# Patient Record
Sex: MALE | Race: WHITE | NOT HISPANIC OR LATINO | Employment: OTHER | URBAN - METROPOLITAN AREA
[De-identification: names, ages, dates, MRNs, and addresses within clinical notes are randomized per-mention and may not be internally consistent; named-entity substitution may affect disease eponyms.]

---

## 2018-07-24 PROBLEM — I10 HTN, GOAL BELOW 140/90: Status: ACTIVE | Noted: 2018-07-24

## 2018-10-01 ENCOUNTER — HOSPITAL ENCOUNTER (OUTPATIENT)
Dept: RADIOLOGY | Facility: HOSPITAL | Age: 73
Discharge: HOME | End: 2018-10-01
Attending: INTERNAL MEDICINE
Payer: MEDICARE

## 2018-10-01 DIAGNOSIS — M25.552 LEFT HIP PAIN: ICD-10-CM

## 2018-10-01 PROBLEM — R52 ACUTE PAIN: Status: ACTIVE | Noted: 2018-10-01

## 2018-10-01 PROCEDURE — 73502 X-RAY EXAM HIP UNI 2-3 VIEWS: CPT | Mod: TC,FY,LT

## 2018-10-01 PROCEDURE — 73502 X-RAY EXAM HIP UNI 2-3 VIEWS: CPT | Mod: 26,LT,, | Performed by: RADIOLOGY

## 2018-12-27 PROBLEM — M10.9 ACUTE GOUT OF RIGHT ANKLE: Status: ACTIVE | Noted: 2018-12-27

## 2018-12-27 PROBLEM — M79.604 RIGHT LEG PAIN: Status: ACTIVE | Noted: 2018-12-27

## 2018-12-27 PROBLEM — M10.9 GOUTY ARTHRITIS OF RIGHT GREAT TOE: Status: ACTIVE | Noted: 2018-12-27

## 2019-01-28 ENCOUNTER — HOSPITAL ENCOUNTER (OUTPATIENT)
Dept: RADIOLOGY | Facility: HOSPITAL | Age: 74
Discharge: HOME | End: 2019-01-28
Attending: INTERNAL MEDICINE
Payer: MEDICARE

## 2019-01-28 DIAGNOSIS — M25.571 ACUTE RIGHT ANKLE PAIN: ICD-10-CM

## 2019-01-28 PROCEDURE — 73610 X-RAY EXAM OF ANKLE: CPT | Mod: 26,RT,, | Performed by: RADIOLOGY

## 2019-01-28 PROCEDURE — 73610 XR ANKLE COMPLETE 3 VIEW RIGHT: ICD-10-PCS | Mod: 26,RT,, | Performed by: RADIOLOGY

## 2019-01-28 PROCEDURE — 73610 X-RAY EXAM OF ANKLE: CPT | Mod: TC,FY,RT

## 2019-01-28 NOTE — PROGRESS NOTES
Per Dr. Bates- Pt has fracture. I spoke with Dr. Wharton and he recommended pt go straight to ER. We communicated this to the pt. -LGF

## 2019-01-29 ENCOUNTER — HOSPITAL ENCOUNTER (EMERGENCY)
Facility: HOSPITAL | Age: 74
Discharge: HOME | End: 2019-01-29
Attending: EMERGENCY MEDICINE
Payer: MEDICARE

## 2019-01-29 VITALS
OXYGEN SATURATION: 93 % | RESPIRATION RATE: 22 BRPM | HEIGHT: 69 IN | WEIGHT: 295 LBS | SYSTOLIC BLOOD PRESSURE: 188 MMHG | BODY MASS INDEX: 43.69 KG/M2 | DIASTOLIC BLOOD PRESSURE: 93 MMHG | HEART RATE: 92 BPM | TEMPERATURE: 99 F

## 2019-01-29 DIAGNOSIS — S82.891A CLOSED FRACTURE OF RIGHT ANKLE, INITIAL ENCOUNTER: Primary | ICD-10-CM

## 2019-01-29 PROCEDURE — 99283 EMERGENCY DEPT VISIT LOW MDM: CPT | Mod: 25

## 2019-01-29 PROCEDURE — 29515 APPLICATION SHORT LEG SPLINT: CPT | Mod: RT

## 2019-01-29 NOTE — DISCHARGE INSTRUCTIONS
No weight bearing on right leg. Your fracture should require surgery so you must follow up with the orthopedic.

## 2019-01-29 NOTE — ED PROVIDER NOTES
"Encounter Date: 1/29/2019    SCRIBE #1 NOTE: I, Dominic Urbina, am scribing for, and in the presence of,  Morgan Bahena MD. I have scribed the following portions of the note - Other sections scribed: HPI, ROS.       History     Chief Complaint   Patient presents with    Ankle Pain     several weeks ago was doing dishes when he felt "like a hammer to my ankle".   went to PCP yesterday, xray, + Fx.  sent here for further workup.  complaints of right ankle pain.     CC: Ankle Pain    HPI: This 73 y.o. Male with abnormal bilirubin test, arthritis, bilateral knee effusions, chronic bronchitis, chronic sinusitis, GERD, HTN, hyperlipidemia and NELLY presents to the ED for an evaluation of R ankle pain which began while standing unloading his  6 weeks ago. Pt reports his pain feels like "a hammer to my ankle." He recently visited his PCP in 12/2018 and was diagnosed with gout. Pt has been walking with a crutch and walker since leaving his PCP. He attempted tylenol for pain with slight relief and complies with cpap for NELLY. Pt denies fever, abdominal pain, diarrhea, nausea, weakness, fatigue, chest pain, SOB, rhinorrhea or cough.    PCP: Dr. Umang Wharton - most recently seen yesterday  Allergies: lidocaine, kenalog      The history is provided by the patient. No  was used.     Review of patient's allergies indicates:   Allergen Reactions    Bee sting [allergen ext-venom-honey bee]     Culver clement (solidago canadensis)     Kenalog [triamcinolone acetonide]     Lidocaine      Past Medical History:   Diagnosis Date    Abnormal bilirubin test     Arthritis     Atrial fibrillation     Bilateral knee effusions     Bilateral knee pain     Chronic bronchitis     Chronic sinusitis     GERD (gastroesophageal reflux disease)     HTN (hypertension)     HTN, goal below 130/80 6/1/2016    Hyperlipidemia LDL goal < 130     Maxillary sinus polyp     Morbid obesity with BMI of 40.0-44.9, " adult     NELLY (obstructive sleep apnea)      Past Surgical History:   Procedure Laterality Date    APPENDECTOMY      Age 55    COLON SURGERY      collectomy    RECONSTRUCTION-NASAL septum  10/4/2016    [facility]    SINUS SURGERY FUNCTIONAL ENDOSCOPIC WITH NAVIGATION N/A 10/4/2016    [facility]    SPHENOIDECTOMY- FRONTAL-  MAXILLARY- ETHMOID Bilateral 10/4/2016    [facility]formed by [facility] at [facility]     Family History   Problem Relation Age of Onset    Heart failure Mother     Stroke Father         Hemorrhagic stroke    Heart failure Brother         2 brothers    Rheum arthritis Brother     Hypertension Son      Social History     Tobacco Use    Smoking status: Former Smoker     Packs/day: 1.00     Years: 20.00     Pack years: 20.00     Types: Cigarettes     Last attempt to quit: 9/4/2003     Years since quitting: 15.4    Smokeless tobacco: Never Used   Substance Use Topics    Alcohol use: Yes     Comment: occ    Drug use: No     Review of Systems   Constitutional: Negative for chills and fever.   HENT: Negative for ear pain, rhinorrhea and sore throat.    Eyes: Negative for redness.   Respiratory: Negative for shortness of breath.    Cardiovascular: Negative for chest pain.   Gastrointestinal: Negative for abdominal pain, diarrhea, nausea and vomiting.   Genitourinary: Negative for dysuria and hematuria.   Musculoskeletal: Positive for arthralgias (R ankle). Negative for back pain and neck pain.   Skin: Negative for rash.   Neurological: Negative for weakness, numbness and headaches.   Hematological: Does not bruise/bleed easily.       Physical Exam     Initial Vitals   BP Pulse Resp Temp SpO2   01/29/19 0823 01/29/19 0823 01/29/19 0823 01/29/19 0823 01/29/19 0835   (!) 175/81 102 18 97.8 °F (36.6 °C) 95 %      MAP       --                Physical Exam    Nursing note and vitals reviewed.  Constitutional: He appears well-developed  and well-nourished.   HENT:   Head: Atraumatic.   Eyes: EOM are normal. Pupils are equal, round, and reactive to light.   Neck: Normal range of motion. Neck supple. No JVD present.   Cardiovascular: Normal rate, regular rhythm, normal heart sounds and intact distal pulses. Exam reveals no gallop and no friction rub.    No murmur heard.  Pulmonary/Chest: Breath sounds normal.   Abdominal: Soft. Bowel sounds are normal.   Musculoskeletal:   Swelling to the right ankle.  Tenderness to the medial malleolus.  Normal pulses. Normal cap refill.   Lymphadenopathy:     He has no cervical adenopathy.   Neurological: He is alert and oriented to person, place, and time. He has normal strength.   Skin: Skin is warm and dry.   Psychiatric: He has a normal mood and affect. Thought content normal.         ED Course   Orthopedic Injury  Date/Time: 1/29/2019 10:00 AM  Performed by: Morgan Bahena MD  Authorized by: Morgan Bahena MD     Injury:     Injury location:  Ankle    Location details:  Right ankle    Injury type:  Fracture    Fracture type: medial malleolus fracture        Pre-procedure assessment:     Neurovascular status: Neurovascularly intact      Distal perfusion: normal      Neurological function: normal        Selections made in this section will also lock the Injury type section above.:     Manipulation performed?: No      Immobilization:  Splint    Splint type:  Short leg    Supplies used:  Ortho-Glass    Complications: No      Specimens: No      Implants: No    Post-procedure assessment:     Neurovascular status: Neurovascularly intact      Distal perfusion: normal      Neurological function: normal      Range of motion: splinted      Patient tolerance:  Patient tolerated the procedure well with no immediate complications      Labs Reviewed - No data to display       Imaging Results    None       X-Rays:   Independently Interpreted Readings:   Other Readings:  Right ankle x-ray:  Minimally displaced  medial malleolar fracture with intra-articular extension      due to length of time since fracture I discussed the case with Orthopedics who states nonweightbearing, splint, clinic follow-up.          Scribe Attestation:   Scribe #1: I performed the above scribed service and the documentation accurately describes the services I performed. I attest to the accuracy of the note.    Attending Attestation:           Physician Attestation for Scribe:  Physician Attestation Statement for Scribe #1: I, Morgan Bahena MD, reviewed documentation, as scribed by Dominic Urbina in my presence, and it is both accurate and complete.                    Clinical Impression:   The encounter diagnosis was Closed fracture of right ankle, initial encounter.                             Morgan Bahena MD  02/25/19 [id number]

## 2019-01-29 NOTE — ED TRIAGE NOTES
Pt reports approx 6 weeks ago he was standing and he spontaneously had sharp pain to right ankle. Reports his PCP sent him 2 days ago to ER & he just had a ride today.

## 2019-03-03 ENCOUNTER — HOSPITAL ENCOUNTER (EMERGENCY)
Facility: HOSPITAL | Age: 74
Discharge: HOME | End: 2019-03-03
Attending: EMERGENCY MEDICINE
Payer: MEDICARE

## 2019-03-03 VITALS
HEART RATE: 70 BPM | WEIGHT: 300 LBS | SYSTOLIC BLOOD PRESSURE: 146 MMHG | OXYGEN SATURATION: 98 % | BODY MASS INDEX: 45.47 KG/M2 | RESPIRATION RATE: 16 BRPM | DIASTOLIC BLOOD PRESSURE: 87 MMHG | HEIGHT: 68 IN | TEMPERATURE: 98 F

## 2019-03-03 DIAGNOSIS — S60.811A ABRASION OF RIGHT WRIST, INITIAL ENCOUNTER: ICD-10-CM

## 2019-03-03 DIAGNOSIS — S06.0X1A CONCUSSION WITH LOSS OF CONSCIOUSNESS OF 30 MINUTES OR LESS, INITIAL ENCOUNTER: ICD-10-CM

## 2019-03-03 DIAGNOSIS — S80.211A ABRASION, RIGHT KNEE, INITIAL ENCOUNTER: ICD-10-CM

## 2019-03-03 DIAGNOSIS — S00.81XA ABRASION OF FACE, INITIAL ENCOUNTER: ICD-10-CM

## 2019-03-03 DIAGNOSIS — S00.83XA CONTUSION OF FACE, INITIAL ENCOUNTER: Primary | ICD-10-CM

## 2019-03-03 DIAGNOSIS — F10.920 ALCOHOLIC INTOXICATION WITHOUT COMPLICATION: ICD-10-CM

## 2019-03-03 PROCEDURE — 25000003 PHARM REV CODE 250: Performed by: EMERGENCY MEDICINE

## 2019-03-03 PROCEDURE — 99284 EMERGENCY DEPT VISIT MOD MDM: CPT

## 2019-03-03 RX ORDER — TRAMADOL HYDROCHLORIDE 50 MG/1
50 TABLET ORAL EVERY 6 HOURS PRN
Qty: 20 TABLET | Refills: 0 | Status: SHIPPED | OUTPATIENT
Start: 2019-03-03 | End: 2019-03-13

## 2019-03-03 RX ORDER — MELOXICAM 7.5 MG/1
7.5 TABLET ORAL DAILY
Qty: 20 TABLET | Refills: 0 | Status: SHIPPED | OUTPATIENT
Start: 2019-03-03 | End: 2019-04-26

## 2019-03-03 RX ORDER — TIZANIDINE 2 MG/1
4 TABLET ORAL EVERY 6 HOURS PRN
Qty: 20 TABLET | Refills: 0 | Status: SHIPPED | OUTPATIENT
Start: 2019-03-03 | End: 2019-03-13

## 2019-03-03 RX ADMIN — BACITRACIN, NEOMYCIN, POLYMYXIN B 1 EACH: 400; 3.5; 5 OINTMENT TOPICAL at 09:03

## 2019-03-04 NOTE — ED TRIAGE NOTES
"Reports drinking 5 beers and 5 16oz rum and coke this afternoon, pt was sitting in a rocking chair on the front porch and fell forward hitting his head on the sidewalk. Pt has swelling and dark purple bruising noted to the left eye and abrasions to the left eyebrow area, rt knee and rt forearm, pt states, "I was drinking my rum and coke, you know, and I fell now I am here and am like what the hell!" Pt also has on a LLE immobilizer rocker boot.  Pt denies any other complaints. Supposed to be on blood thinners refusing to take them currently.  "

## 2019-03-04 NOTE — ED PROVIDER NOTES
"Encounter Date: 3/3/2019       History     Chief Complaint   Patient presents with    Alcohol Intoxication     reports drinking 5 beers and 5 16oz rum and coke this afternoon, pt was sitting in a rocking chair on the front porch and fell forward hitting his head on the sidewalk. Pt has swelling and bruising noted to the left eye, pt states, "I was drinking my rum and coke, you know, and I fell now I am here and am like what the hell!" Pt denies any other complaints. Supposed to be on blood thinners refusing to take them currently.      Patient presents after ground level fall via EMS.  He is heavily intoxicated on alcohol.  He fell from a chair face 1st onto the concrete.  No loss of consciousness.  Patient is amnestic events.  He has no complaints. Bystanders noted that he had a injury to the left periorbital area.  He also has abrasions to the lower right arm and right knee.  EMS notes severe bruising and swelling to the left periorbital area with abrasions to the forehead and face.  Patient denies neck pain or back pain he denies chest pain or shortness of breath. No abdominal pain. No focal numbness or weakness. No pain in the extremities. Patient has atrial fibrillation but is no longer taking anticoagulants because he is not compliant.  States he has not taken it for 3 years.          Review of patient's allergies indicates:   Allergen Reactions    Bee sting [allergen ext-venom-honey bee]     Culver clement (solidago canadensis)     Kenalog [triamcinolone acetonide]     Lidocaine      Past Medical History:   Diagnosis Date    Abnormal bilirubin test     Arthritis     Atrial fibrillation     Bilateral knee effusions     Bilateral knee pain     Chronic bronchitis     Chronic sinusitis     GERD (gastroesophageal reflux disease)     HTN (hypertension)     HTN, goal below 130/80 6/1/2016    Hyperlipidemia LDL goal < 130     Maxillary sinus polyp     Morbid obesity with BMI of 40.0-44.9, adult     NELLY " (obstructive sleep apnea)      Past Surgical History:   Procedure Laterality Date    APPENDECTOMY      Age 55    COLON SURGERY      collectomy    RECONSTRUCTION-NASAL septum  10/4/2016    [facility]    SINUS SURGERY FUNCTIONAL ENDOSCOPIC WITH NAVIGATION N/A 10/4/2016    [facility]    SPHENOIDECTOMY- FRONTAL-  MAXILLARY- ETHMOID Bilateral 10/4/2016    [facility] at [facility]     Family History   Problem Relation Age of Onset    Heart failure Mother     Stroke Father         Hemorrhagic stroke    Heart failure Brother         2 brothers    Rheum arthritis Brother     Hypertension Son      Social History     Tobacco Use    Smoking status: Former Smoker     Packs/day: 1.00     Years: 20.00     Pack years: 20.00     Types: Cigarettes     Last attempt to quit: 9/4/2003     Years since quitting: 15.5    Smokeless tobacco: Never Used   Substance Use Topics    Alcohol use: Yes     Comment: occ    Drug use: No     Review of Systems   Constitutional: Negative for diaphoresis.   HENT: Positive for facial swelling. Negative for ear pain and nosebleeds.    Eyes: Negative for pain and visual disturbance.   Respiratory: Negative for chest tightness and shortness of breath.    Cardiovascular: Negative for chest pain.   Gastrointestinal: Negative for abdominal pain, diarrhea, nausea and vomiting.   Genitourinary: Negative for flank pain.   Musculoskeletal: Negative for arthralgias, back pain, joint swelling, myalgias and neck pain.   Skin: Positive for wound.   Neurological: Positive for syncope. Negative for dizziness, tremors, seizures, facial asymmetry, speech difficulty, weakness, light-headedness, numbness and headaches.   Psychiatric/Behavioral: Positive for confusion. Negative for agitation and hallucinations.       Physical Exam     Initial Vitals [03/03/19 2058]   BP Pulse Resp Temp SpO2   (!) 148/87 71 18 97.8 °F (36.6 °C) (!) 94 %       MAP       --         Physical Exam    Nursing note and vitals reviewed.  Constitutional: He appears well-developed and well-nourished. He is not diaphoretic. No distress.   HENT:   Head: Normocephalic.   Right Ear: External ear normal.   Left Ear: External ear normal.   Nose: Nose normal.   Mouth/Throat: Oropharynx is clear and moist.   Deep abrasion to left temporal area.  Superficial abrasion left forehead.  Superficial abrasion left maxillary area.  No bony tenderness to the face.  No periorbital tenderness. Large contusion ecchymosis to the left lower lid in periorbital area.  No nasal bone tenderness. No dental injury. No mandibular tenderness.   Eyes: Conjunctivae and EOM are normal. Pupils are equal, round, and reactive to light. Right eye exhibits no discharge. Left eye exhibits no discharge.   Neck: Neck supple. No tracheal deviation present.   No cervical spine tenderness.   Cardiovascular: Normal rate, regular rhythm and normal heart sounds.   No murmur heard.  Pulmonary/Chest: Breath sounds normal. No stridor. No respiratory distress. He has no wheezes. He has no rhonchi. He has no rales. He exhibits no tenderness.   Abdominal: Soft. He exhibits no distension. There is no tenderness. There is no rebound and no guarding.   Musculoskeletal: Normal range of motion. He exhibits no edema or tenderness.   Superficial abrasion to right anterior knee.  Superficial abrasion to dorsal right wrist and elbow.  No bony tenderness to bilateral upper lower extremities. No pain with range of motion bilateral upper lower extremities. No deformity or joint effusions of bilateral upper lower extremities. No tenderness over thoracic or lumbar spine.   Neurological: He is alert and oriented to person, place, and time. He has normal strength. No cranial nerve deficit. GCS score is 15. GCS eye subscore is 4. GCS verbal subscore is 5. GCS motor subscore is 6.   Skin: Skin is warm and dry. No rash noted.   Psychiatric: His  behavior is normal. Thought content normal.   Intoxicated.  Slurred speech.  Patient smells like alcohol.  Patient euphoric.         ED Course   Procedures  Labs Reviewed - No data to display       Imaging Results    None          Medical Decision Making:   Differential Diagnosis:   Contusion, fracture, traumatic brain injury, concussion, alcohol intoxication,  Clinical Tests:   Radiological Study: Reviewed  ED Management:  No fractures or traumatic brain injury on CT.  Patient's mental status remains normal. GCS 15.  Patient is no longer on anticoagulants.  Patient is stable for discharge. Patient given instructions on local wound care.  Patient also given instructions on head injuries.                      Clinical Impression:       ICD-10-CM ICD-9-CM   1. Contusion of face, initial encounter S00.83XA 920   2. Concussion with loss of consciousness of 30 minutes or less, initial encounter S06.0X1A 850.11   3. Alcoholic intoxication without complication F10.920 305.00   4. Abrasion of face, initial encounter S00.81XA 910.0   5. Abrasion, right knee, initial encounter S80.211A 916.0   6. Abrasion of right wrist, initial encounter S60.811A 913.0         Disposition:   Disposition: Discharged  Condition: Stable                        Glen Fernando MD  03/03/19 0360

## 2019-03-21 ENCOUNTER — HOSPITAL ENCOUNTER (EMERGENCY)
Facility: HOSPITAL | Age: 74
Discharge: HOME | End: 2019-03-21
Attending: EMERGENCY MEDICINE
Payer: MEDICARE

## 2019-03-21 VITALS
BODY MASS INDEX: 44.43 KG/M2 | HEART RATE: 73 BPM | OXYGEN SATURATION: 97 % | HEIGHT: 69 IN | TEMPERATURE: 98 F | WEIGHT: 300 LBS | SYSTOLIC BLOOD PRESSURE: 136 MMHG | DIASTOLIC BLOOD PRESSURE: 82 MMHG | RESPIRATION RATE: 20 BRPM

## 2019-03-21 DIAGNOSIS — R33.9 URINARY RETENTION: Primary | ICD-10-CM

## 2019-03-21 LAB
BACTERIA #/AREA URNS HPF: ABNORMAL /HPF
BILIRUB UR QL STRIP: NEGATIVE
CLARITY UR: CLEAR
COLOR UR: YELLOW
GLUCOSE UR QL STRIP: NEGATIVE
HGB UR QL STRIP: ABNORMAL
HYALINE CASTS #/AREA URNS LPF: 0 /LPF
KETONES UR QL STRIP: NEGATIVE
LEUKOCYTE ESTERASE UR QL STRIP: NEGATIVE
MICROSCOPIC COMMENT: ABNORMAL
NITRITE UR QL STRIP: NEGATIVE
PH UR STRIP: 5 [PH] (ref 5–8)
PROT UR QL STRIP: ABNORMAL
RBC #/AREA URNS HPF: 10 /HPF (ref 0–4)
SP GR UR STRIP: 1.01 (ref 1–1.03)
URN SPEC COLLECT METH UR: ABNORMAL
UROBILINOGEN UR STRIP-ACNC: NEGATIVE EU/DL
WBC #/AREA URNS HPF: 1 /HPF (ref 0–5)

## 2019-03-21 PROCEDURE — 81000 URINALYSIS NONAUTO W/SCOPE: CPT

## 2019-03-21 PROCEDURE — 51702 INSERT TEMP BLADDER CATH: CPT

## 2019-03-21 PROCEDURE — 51798 US URINE CAPACITY MEASURE: CPT

## 2019-03-21 PROCEDURE — 99283 EMERGENCY DEPT VISIT LOW MDM: CPT | Mod: 25

## 2019-03-21 RX ORDER — TAMSULOSIN HYDROCHLORIDE 0.4 MG/1
0.4 CAPSULE ORAL DAILY
Qty: 10 CAPSULE | Refills: 0 | Status: SHIPPED | OUTPATIENT
Start: 2019-03-21 | End: 2019-03-29 | Stop reason: SDUPTHER

## 2019-03-21 NOTE — ED PROVIDER NOTES
Encounter Date: 3/21/2019    SCRIBE #1 NOTE: I, Jones Ledezma, am scribing for, and in the presence of,  Per Schulz MD. I have scribed the following portions of the note - Other sections scribed: HPI and ROS.       History     Chief Complaint   Patient presents with    Urinary Retention     last urinated 2 days ago; lower abdominal pain radiates to left side     CC: Urinary Retention    HPI: This 73 y.0 M with a hx of Abnormal bilirubin test, Arthritis, A-fib, GERD, HTN HLD, Maxillary sinus polyp, Morbid obesity with BMI of 40.0-44.9, adult, and NELLY presents to the ED c/o urinary retention with associated suprapubic abdominal pain x2 days. He also reports intermittent RUQ. The pt last urinated at 2300h 3/19/19. He notes that he drank 5 beers 3/19/19 PM. The pt states that he has not eaten in over 20 hours. Additionally, he reports an intermittent rash to the bilateral upper extremities. He denies fever, chills, diaphoresis, nausea, emesis, diaphoresis, headache, otalgia, sore throat, congestion and SOB. No prior tx. He does not smoke cigarettes.       The history is provided by the patient. No  was used.     Review of patient's allergies indicates:   Allergen Reactions    Bee sting [allergen ext-venom-honey bee]     Culver clement (solidago canadensis)     Kenalog [triamcinolone acetonide]     Lidocaine      Past Medical History:   Diagnosis Date    Abnormal bilirubin test     Arthritis     Atrial fibrillation     Bilateral knee effusions     Bilateral knee pain     Chronic bronchitis     Chronic sinusitis     GERD (gastroesophageal reflux disease)     HTN (hypertension)     HTN, goal below 130/80 6/1/2016    Hyperlipidemia LDL goal < 130     Maxillary sinus polyp     Morbid obesity with BMI of 40.0-44.9, adult     NELLY (obstructive sleep apnea)      Past Surgical History:   Procedure Laterality Date    APPENDECTOMY      Age 55    COLON SURGERY      collectomy    NOSE SURGERY       RECONSTRUCTION-NASAL septum  10/4/2016    [facility]    SINUS SURGERY FUNCTIONAL ENDOSCOPIC WITH NAVIGATION N/A 10/4/2016    [facility]    SPHENOIDECTOMY- FRONTAL-  MAXILLARY- ETHMOID Bilateral 10/4/2016    [facility]     Family History   Problem Relation Age of Onset    Heart failure Mother     Stroke Father         Hemorrhagic stroke    Heart failure Brother         2 brothers    Rheum arthritis Brother     Hypertension Son      Social History     Tobacco Use    Smoking status: Former Smoker     Packs/day: 1.00     Years: 20.00     Pack years: 20.00     Types: Cigarettes     Last attempt to quit: 9/4/2003     Years since quitting: 15.5    Smokeless tobacco: Never Used   Substance Use Topics    Alcohol use: Yes     Comment: Occasionally    Drug use: No     Review of Systems   Constitutional: Negative for chills and fever.   HENT: Negative for congestion, ear pain, rhinorrhea and sore throat.    Eyes: Negative for pain and visual disturbance.   Respiratory: Negative for cough and shortness of breath.    Cardiovascular: Negative for chest pain.   Gastrointestinal: Positive for abdominal pain. Negative for diarrhea, nausea and vomiting.   Genitourinary: Negative for dysuria.        (+) urinary retention   Musculoskeletal: Negative for back pain and neck pain.   Skin: Positive for rash (intermittent).   Neurological: Negative for headaches.       Physical Exam     Initial Vitals   BP Pulse Resp Temp SpO2   03/21/19 0838 03/21/19 0838 03/21/19 0838 03/21/19 0838 03/21/19 0900   (!) 202/98 89 20 97.5 °F (36.4 °C) 96 %      MAP       --                Physical Exam  The patient was examined specifically for the following:   General:No significant distress, Good color, Warm and dry. Head and neck:Scalp atraumatic, Neck supple. Neurological:Appropriate conversation, Gross motor deficits. Eyes:Conjugate gaze, Clear  corneas. ENT: No epistaxis. Cardiac: Regular rate and rhythm, Grossly normal heart tones. Pulmonary: Wheezing, Rales. Gastrointestinal: Abdominal tenderness, Abdominal distention. Musculoskeletal: Extremity deformity, Apparent pain with range of motion of the joints. Skin: Rash.   The findings on examination were normal except for the following:  The patient has a contusion of on the left face.  The lungs are clear.  The heart tones are normal.  The abdomen is remarkable for bilateral pelvic tenderness and fullness.   ED Course   Procedures  Labs Reviewed   URINALYSIS - Abnormal; Notable for the following components:       Result Value    Protein, UA 1+ (*)     Occult Blood UA 2+ (*)     All other components within normal limits   URINALYSIS MICROSCOPIC - Abnormal; Notable for the following components:    RBC, UA 10 (*)     All other components within normal limits          Imaging Results    None       Medical decision making:  This 73-year-old male presents to the emergency room unable to urinate for 2 days.  A Fatima catheter was placed.  The patient had 2000 mL in the bladder.  He experience relief of his symptoms.  The urine was not infected.  I will discharge this patient to follow up with Urology.  I will start Flomax.  I will discharge him with his catheter in place.  I will have him empty his leg bag as needed.                Scribe Attestation:   Scribe #1: I performed the above scribed service and the documentation accurately describes the services I performed. I attest to the accuracy of the note.    Attending Attestation:           Physician Attestation for Scribe:  Physician Attestation Statement for Scribe #1: I, Per Schulz MD, reviewed documentation, as scribed by Jones Ledezma in my presence, and it is both accurate and complete.                    Clinical Impression:       ICD-10-CM ICD-9-CM   1. Urinary retention R33.9 788.20                                Per Schulz MD  03/27/19  [id number]

## 2019-03-21 NOTE — DISCHARGE INSTRUCTIONS
Please return immediately ifYou get worse or if new problems develop.  Please follow-up with  The urologist above, this week.  Rest.  Please empty your leg bag as needed.  Flomax as directed.

## 2019-03-21 NOTE — ED TRIAGE NOTES
Pt states he has not been urinating. States the last time he urinated was 2 days ago. Denies n/v/d. Also c/o of lower abdominal pain.

## 2019-03-22 ENCOUNTER — HOSPITAL ENCOUNTER (EMERGENCY)
Facility: HOSPITAL | Age: 74
Discharge: HOME | End: 2019-03-22
Attending: EMERGENCY MEDICINE
Payer: MEDICARE

## 2019-03-22 VITALS
HEART RATE: 90 BPM | SYSTOLIC BLOOD PRESSURE: 161 MMHG | BODY MASS INDEX: 45.47 KG/M2 | HEIGHT: 68 IN | OXYGEN SATURATION: 94 % | RESPIRATION RATE: 18 BRPM | TEMPERATURE: 99 F | WEIGHT: 300 LBS | DIASTOLIC BLOOD PRESSURE: 72 MMHG

## 2019-03-22 DIAGNOSIS — R33.9 URINARY RETENTION: ICD-10-CM

## 2019-03-22 DIAGNOSIS — R31.9 HEMATURIA, UNSPECIFIED TYPE: Primary | ICD-10-CM

## 2019-03-22 LAB
ALBUMIN SERPL BCP-MCNC: 3.6 G/DL
ALP SERPL-CCNC: 81 U/L
ALT SERPL W/O P-5'-P-CCNC: 22 U/L
ANION GAP SERPL CALC-SCNC: 6 MMOL/L
APTT BLDCRRT: <21 SEC
AST SERPL-CCNC: 25 U/L
BASOPHILS # BLD AUTO: 0.04 K/UL
BASOPHILS NFR BLD: 0.4 %
BILIRUB SERPL-MCNC: 1 MG/DL
BUN SERPL-MCNC: 20 MG/DL
CALCIUM SERPL-MCNC: 10.2 MG/DL
CHLORIDE SERPL-SCNC: 103 MMOL/L
CO2 SERPL-SCNC: 27 MMOL/L
CREAT SERPL-MCNC: 1.3 MG/DL
DIFFERENTIAL METHOD: ABNORMAL
EOSINOPHIL # BLD AUTO: 0.3 K/UL
EOSINOPHIL NFR BLD: 2.7 %
ERYTHROCYTE [DISTWIDTH] IN BLOOD BY AUTOMATED COUNT: 14.4 %
EST. GFR  (AFRICAN AMERICAN): >60 ML/MIN/1.73 M^2
EST. GFR  (NON AFRICAN AMERICAN): 54 ML/MIN/1.73 M^2
GLUCOSE SERPL-MCNC: 106 MG/DL
HCT VFR BLD AUTO: 41.6 %
HGB BLD-MCNC: 13.5 G/DL
INR PPP: 1
LYMPHOCYTES # BLD AUTO: 1.1 K/UL
LYMPHOCYTES NFR BLD: 11.5 %
MCH RBC QN AUTO: 28.1 PG
MCHC RBC AUTO-ENTMCNC: 32.5 G/DL
MCV RBC AUTO: 87 FL
MONOCYTES # BLD AUTO: 1 K/UL
MONOCYTES NFR BLD: 9.9 %
NEUTROPHILS # BLD AUTO: 7.3 K/UL
NEUTROPHILS NFR BLD: 75.5 %
PLATELET # BLD AUTO: 146 K/UL
PMV BLD AUTO: 13.1 FL
POTASSIUM SERPL-SCNC: 3.9 MMOL/L
PROT SERPL-MCNC: 6.9 G/DL
PROTHROMBIN TIME: 10.6 SEC
RBC # BLD AUTO: 4.8 M/UL
SODIUM SERPL-SCNC: 136 MMOL/L
WBC # BLD AUTO: 9.6 K/UL

## 2019-03-22 PROCEDURE — 99282 EMERGENCY DEPT VISIT SF MDM: CPT | Mod: 25

## 2019-03-22 PROCEDURE — 85730 THROMBOPLASTIN TIME PARTIAL: CPT

## 2019-03-22 PROCEDURE — 80053 COMPREHEN METABOLIC PANEL: CPT

## 2019-03-22 PROCEDURE — 85025 COMPLETE CBC W/AUTO DIFF WBC: CPT

## 2019-03-22 PROCEDURE — 51700 IRRIGATION OF BLADDER: CPT

## 2019-03-22 PROCEDURE — 85610 PROTHROMBIN TIME: CPT

## 2019-03-22 NOTE — ED PROVIDER NOTES
Encounter Date: 3/22/2019    SCRIBE #1 NOTE: I, Lacy Jones, am scribing for, and in the presence of,  Per Schulz MD. I have scribed the following portions of the note - Other sections scribed: HPI and ROS.       History     Chief Complaint   Patient presents with    Hematuria     Seen in the ED yesterday due to urinary retention, sent home with jaimes cath. Clear urine was ntoed yesterday, today only blood in cath bag. Reports bladder pressure.      CC: Hematuria    HPI: This 73 y.o male who has HTN, Arthritis, Atrial fibrillation, NELLY, GERD, HLD presents to the ED for an evaluation for hematuria that began earlier today.  Patient also reports of lower abdominal pain and has the sensation that he is not completely voiding his bladder.  Patient was seen in this ED on yesterday for urinary retention and was discharged home with a jaimes catheter.  Patient was prescribed Flomax and was advised to follow up with urology.  Patient also reports having diarrhea.  Patient denies fever, chills, nausea, emesis, cough, rhinorrhea, rash, back pain, or any other associated symptoms.  No alleviating factors.      The history is provided by the patient. No  was used.     Review of patient's allergies indicates:   Allergen Reactions    Bee sting [allergen ext-venom-honey bee]     Culver clement (solidago canadensis)     Kenalog [triamcinolone acetonide]     Lidocaine      Past Medical History:   Diagnosis Date    Abnormal bilirubin test     Arthritis     Atrial fibrillation     Bilateral knee effusions     Bilateral knee pain     Chronic bronchitis     Chronic sinusitis     GERD (gastroesophageal reflux disease)     HTN (hypertension)     HTN, goal below 130/80 6/1/2016    Hyperlipidemia LDL goal < 130     Maxillary sinus polyp     Morbid obesity with BMI of 40.0-44.9, adult     NELLY (obstructive sleep apnea)      Past Surgical History:   Procedure Laterality Date    APPENDECTOMY      Age 55     COLON SURGERY      collectomy    NOSE SURGERY      RECONSTRUCTION-NASAL septum  10/4/2016    [facility]    SINUS SURGERY FUNCTIONAL ENDOSCOPIC WITH NAVIGATION N/A 10/4/2016    [facility]    SPHENOIDECTOMY- FRONTAL-  MAXILLARY- ETHMOID Bilateral 10/4/2016    Per[facility] at [facility] OR     Family History   Problem Relation Age of Onset    Heart failure Mother     Stroke Father         Hemorrhagic stroke    Heart failure Brother         2 brothers    Rheum arthritis Brother     Hypertension Son      Social History     Tobacco Use    Smoking status: Former Smoker     Packs/day: 1.00     Years: 20.00     Pack years: 20.00     Types: Cigarettes     Last attempt to quit: 9/4/2003     Years since quitting: 15.5    Smokeless tobacco: Never Used   Substance Use Topics    Alcohol use: Yes     Comment: Occasionally    Drug use: No     Review of Systems   Constitutional: Negative for chills and fever.   HENT: Negative for congestion, ear pain, rhinorrhea and sore throat.    Eyes: Negative for pain and visual disturbance.   Respiratory: Negative for cough and shortness of breath.    Cardiovascular: Negative for chest pain.   Gastrointestinal: Positive for abdominal pain and diarrhea. Negative for nausea and vomiting.   Genitourinary: Positive for hematuria. Negative for dysuria.   Musculoskeletal: Negative for back pain and neck pain.   Skin: Negative for rash.   Neurological: Negative for headaches.       Physical Exam     Initial Vitals [03/22/19 1713]   BP Pulse Resp Temp SpO2   (!) 145/70 89 (!) 22 97.9 °F (36.6 °C) 95 %      MAP       --         Physical Exam  The patient was examined specifically for the following:   General:No significant distress, Good color, Warm and dry. Head and neck:Scalp atraumatic, Neck supple. Neurological:Appropriate conversation, Gross motor deficits. Eyes:Conjugate gaze, Clear corneas. ENT: No  epistaxis. Cardiac: Regular rate and rhythm, Grossly normal heart tones. Pulmonary: Wheezing, Rales. Gastrointestinal: Abdominal tenderness, Abdominal distention. Musculoskeletal: Extremity deformity, Apparent pain with range of motion of the joints. Skin: Rash.   The findings on examination were normal except for the following:  Patient is morbidly obese.  He has a Fatima in place with a bag full of bloody urine.   ED Course   Urinary Catheter  Date/Time: 3/22/2019 7:51 PM  Performed by: Per Schulz MD  Authorized by: Pre Schulz MD   Indications: urinary retention  Local anesthesia used: no    Anesthesia:  Local anesthesia used: no  Patient sedated: no  Catheter insertion: indwelling  Catheter type: coude  Catheter size: 22 Fr  Complicated insertion: no  Altered anatomy: no  Bladder irrigation: yes  Number of attempts: 1  Urine characteristics: bloody  Complications: No  Specimens: No  Implants: No        Labs Reviewed   CBC W/ AUTO DIFFERENTIAL - Abnormal; Notable for the following components:       Result Value    Hemoglobin 13.5 (*)     Platelets 146 (*)     MPV 13.1 (*)     Gran% 75.5 (*)     Lymph% 11.5 (*)     All other components within normal limits   APTT   PROTIME-INR   COMPREHENSIVE METABOLIC PANEL   URINALYSIS, REFLEX TO URINE CULTURE          Imaging Results    None       Medical decision making:  Given the above, this patient presents to the emergency room with hematuria.  He had a Fatima placed for retention.  The Fatima is now obstructed with blood clots.  I replaced Fatima with a 22 Irish coude catheter.  Consultation was obtained with Dr. Terry, this was his suggestion.  We will irrigate the bladder and discharge the patient if we feel that we can relieve the obstruction.  The patient drained 500 mL after the Fatima was placed.  Laboratory work is pending.  This will be signed out to the next emergency physician                Scribe Attestation:   Scribe #1: I performed the above scribed  service and the documentation accurately describes the services I performed. I attest to the accuracy of the note.    Attending Attestation:           Physician Attestation for Scribe:  Physician Attestation Statement for Scribe #1: I, Per Schulz MD, reviewed documentation, as scribed by Lacy Jones in my presence, and it is both accurate and complete.                    Clinical Impression:       ICD-10-CM ICD-9-CM   1. Hematuria, unspecified type R31.9 599.70   2. Urinary retention R33.9 788.20                                Per Schulz MD  03/22/19 1951

## 2019-03-23 NOTE — DISCHARGE INSTRUCTIONS
Please empty your leg bag as needed.  Return immediately if you get worse or if new problems develop.  Please follow-up with Urology on Monday or Tuesday of next week.  Call Monday morning for an appointment.  Lots of liquids.  Rest.

## 2019-03-23 NOTE — ED NOTES
Bladder irrigation completed, bladder draining light pink urine. MD made aware. Pt informed to follow-up with urology Monday morning. Leg bag applied, urine draining appropriately. In NAD, will monitor.

## 2019-03-23 NOTE — ED TRIAGE NOTES
Pt arrived via personal transport. He was seen yesterday for urinary retention and jaimes catheter was placed. Pt reports today that urine collection bag was filled only with blood and he is feeling bladder fullness.

## 2019-03-26 ENCOUNTER — TELEPHONE (OUTPATIENT)
Dept: UROLOGY | Facility: CLINIC | Age: 74
End: 2019-03-26

## 2019-03-26 NOTE — TELEPHONE ENCOUNTER
Patient notified that if the leg anchor is causing him discomfort- he can remove it- make sure leg bag is placed above the knee to prevent pulling and tugging of catheter. Place triple abt cream around the head of the penis to prevent irritation and infx. Apt made for Friday for a VOT with NP- location and time confirmed with patient.

## 2019-03-29 ENCOUNTER — OFFICE VISIT (OUTPATIENT)
Dept: UROLOGY | Facility: CLINIC | Age: 74
End: 2019-03-29
Payer: MEDICARE

## 2019-03-29 VITALS
BODY MASS INDEX: 45.47 KG/M2 | SYSTOLIC BLOOD PRESSURE: 118 MMHG | DIASTOLIC BLOOD PRESSURE: 60 MMHG | WEIGHT: 300 LBS | HEIGHT: 68 IN

## 2019-03-29 DIAGNOSIS — N40.1 BPH WITH OBSTRUCTION/LOWER URINARY TRACT SYMPTOMS: ICD-10-CM

## 2019-03-29 DIAGNOSIS — N13.8 BPH WITH OBSTRUCTION/LOWER URINARY TRACT SYMPTOMS: ICD-10-CM

## 2019-03-29 DIAGNOSIS — R33.9 URINARY RETENTION: ICD-10-CM

## 2019-03-29 DIAGNOSIS — R31.0 GROSS HEMATURIA: ICD-10-CM

## 2019-03-29 PROCEDURE — 51700 IRRIGATION OF BLADDER: CPT | Mod: S$PBB,,, | Performed by: NURSE PRACTITIONER

## 2019-03-29 PROCEDURE — 99204 PR OFFICE/OUTPT VISIT, NEW, LEVL IV, 45-59 MIN: ICD-10-PCS | Mod: S$PBB,25,, | Performed by: NURSE PRACTITIONER

## 2019-03-29 PROCEDURE — 51700 IRRIGATION OF BLADDER: CPT | Mod: PBBFAC | Performed by: NURSE PRACTITIONER

## 2019-03-29 PROCEDURE — 99204 OFFICE O/P NEW MOD 45 MIN: CPT | Mod: S$PBB,25,, | Performed by: NURSE PRACTITIONER

## 2019-03-29 PROCEDURE — 51700 PR IRRIGATION, BLADDER: ICD-10-PCS | Mod: S$PBB,,, | Performed by: NURSE PRACTITIONER

## 2019-03-29 PROCEDURE — 99999 PR PBB SHADOW E&M-EST. PATIENT-LVL IV: CPT | Mod: PBBFAC,,, | Performed by: NURSE PRACTITIONER

## 2019-03-29 PROCEDURE — 99214 OFFICE O/P EST MOD 30 MIN: CPT | Mod: PBBFAC,25 | Performed by: NURSE PRACTITIONER

## 2019-03-29 PROCEDURE — 51702 INSERT TEMP BLADDER CATH: CPT | Mod: PBBFAC | Performed by: NURSE PRACTITIONER

## 2019-03-29 PROCEDURE — 99999 PR PBB SHADOW E&M-EST. PATIENT-LVL IV: ICD-10-PCS | Mod: PBBFAC,,, | Performed by: NURSE PRACTITIONER

## 2019-03-29 RX ORDER — TAMSULOSIN HYDROCHLORIDE 0.4 MG/1
0.4 CAPSULE ORAL DAILY
Qty: 30 CAPSULE | Refills: 11 | Status: ON HOLD | OUTPATIENT
Start: 2019-03-29 | End: 2019-04-11 | Stop reason: SDUPTHER

## 2019-03-29 NOTE — PROGRESS NOTES
Subjective:       Patient ID: John E Sandifer is a 73 y.o. male who is a new patient was referred by Per Schulz MD for evaluation of urinary retention and gross hematuria    Chief Complaint:   Chief Complaint   Patient presents with    Other     VOT to be done today.. catheter was placed in Thursday last week.. could not use the bathrrom and was drained Friday because he was passing nothing but blood .. Saturday morning he noticed blood clots and now its clear       Urinary Retention  Patient complains of urinary retention. Onset of retention was several days ago and was sudden in onset. Patient currently does have a urinary catheter in place.  2L of urine were drained when catheter was placed. Prior to this event voiding symptoms consisted of slow stream, intermittency, nocturia x 2. Prior treatments include none. Recent medications that may have affected his voiding include none.  Denies prior occurrences of urinary retention.    He presented to the ER on 3/21/19 with a 2 day history of urinary retention. Jaimes placed in the ER and patient started on Flomax x 10 days. He is tolerating is jaimes fairly well. Denies flank pain or fever.    Hematuria  Patient complains of gross hematuria. Onset of hematuria was several days ago and was sudden in onset. There is not a history of nephrolithiasis. There is not a history of urologic trauma. Other urologic symptoms include slow stream, intermittency, nocturia. Patient admits to history of tobacco use. Patient denies history of Agent Orange exposure, chronic Jaimes catheter,  surgeries, sexually transmitted diseases, trauma and urolithiasis. Prior workup has been none.    Patient presented back to the ER the following day with c/o gross hematuria after having jaimes placed. He denies any difficulty or trauma with jaimes placement.  Jaimes catheter noted to be obstructed with blood clots. Catheter was irrigated and replaced in the ER.  He has not had any further  occurrences of gross hematuria since this time    ACTIVE MEDICAL ISSUES:  Patient Active Problem List   Diagnosis    A-fib    HTN (hypertension)    HLD (hyperlipidemia)    NELLY (obstructive sleep apnea)    Obesity    Essential hypertension, benign    Obstructive chronic bronchitis with exacerbation    Anticoagulated on Coumadin    COPD (chronic obstructive pulmonary disease)    Knee pain    OA (osteoarthritis) of knee    Chronic bronchitis    Body mass index 40.0-44.9, adult    Abnormal liver ultrasound    Chronic sinusitis    Epistaxis, recurrent    Encounter for current long-term use of anticoagulants    Allergic rhinitis    Conjunctivitis of left eye    SOB (shortness of breath)    Essential hypertension    Chronic a-fib    Pre-op evaluation    Deviated nasal septum    HTN, goal below 140/90    Left hip pain    Acute pain    Right leg pain    Gouty arthritis of right great toe    Acute gout of right ankle    Urinary retention    Gross hematuria    BPH with obstruction/lower urinary tract symptoms       PAST MEDICAL HISTORY  Past Medical History:   Diagnosis Date    Abnormal bilirubin test     Arthritis     Atrial fibrillation     Bilateral knee effusions     Bilateral knee pain     Chronic bronchitis     Chronic sinusitis     GERD (gastroesophageal reflux disease)     HTN (hypertension)     HTN, goal below 130/80 6/1/2016    Hyperlipidemia LDL goal < 130     Maxillary sinus polyp     Morbid obesity with BMI of 40.0-44.9, adult     NELLY (obstructive sleep apnea)        PAST SURGICAL HISTORY:  Past Surgical History:   Procedure Laterality Date    APPENDECTOMY      Age 55    COLON SURGERY      collectomy    NOSE SURGERY      RECONSTRUCTION-NASAL septum  10/4/2016    [facility]    SINUS SURGERY FUNCTIONAL ENDOSCOPIC WITH NAVIGATION N/A 10/4/2016    [facility]    SPHENOIDECTOMY- FRONTAL-  MAXILLARY- ETHMOID  Bilateral 10/4/2016    [facility]       SOCIAL HISTORY:  Social History     Tobacco Use    Smoking status: Former Smoker     Packs/day: 1.00     Years: 20.00     Pack years: 20.00     Types: Cigarettes     Last attempt to quit: 9/4/2003     Years since quitting: 15.5    Smokeless tobacco: Never Used   Substance Use Topics    Alcohol use: Yes     Comment: Occasionally    Drug use: No       FAMILY HISTORY:  Family History   Problem Relation Age of Onset    Heart failure Mother     Stroke Father         Hemorrhagic stroke    Heart failure Brother         2 brothers    Rheum arthritis Brother     Hypertension Son        ALLERGIES AND MEDICATIONS: updated and reviewed.  Review of patient's allergies indicates:   Allergen Reactions    Bee sting [allergen ext-venom-honey bee]     Culver clement (solidago canadensis)     Kenalog [triamcinolone acetonide]     Lidocaine      Current Outpatient Medications   Medication Sig    ADVAIR DISKUS 250-50 mcg/dose diskus inhaler INHALE 1 PUFF BY MOUTH INTO THE LUNGS TWICE DAILY    albuterol (PROAIR HFA) 90 mcg/actuation inhaler INHALE 2 PUFFS INTO THE LUNGS EVERY 6 (SIX) HOURS AS NEEDED FOR WHEEZING.    albuterol (PROVENTIL) 2.5 mg /3 mL (0.083 %) nebulizer solution Take 2.5 mg by nebulization every 6 (six) hours as needed for Wheezing. Rescue    allopurinol (ZYLOPRIM) 100 MG tablet TAKE 1 TABLET BY MOUTH EVERY DAY    amLODIPine (NORVASC) 10 MG tablet TAKE 1 TABLET BY MOUTH EVERY DAY    diclofenac (CATAFLAM) 50 MG tablet TAKE 1 TABLET(50 MG) BY MOUTH DAILY AS NEEDED    GLUCOSAMINE/D3/BOSWELLIA TOO (OSTEO BI-FLEX, 5-LOXIN, ORAL) Take 2 tablets by mouth every evening.    losartan (COZAAR) 100 MG tablet TAKE 1 TABLET BY MOUTH ONCE DAILY    meloxicam (MOBIC) 7.5 MG tablet Take 1 tablet (7.5 mg total) by mouth once daily.    metoprolol tartrate (LOPRESSOR) 50 MG tablet Take 1 tablet (50 mg total) by mouth 2 (two) times daily.     "miscellaneous medical supply Kit 1 Units by Misc.(Non-Drug; Combo Route) route nightly.    nebulizer and compressor Natalya 1 Units by Misc.(Non-Drug; Combo Route) route once daily.    omeprazole (PRILOSEC) 20 MG capsule TAKE 1 CAPSULE(20 MG) BY MOUTH TWICE DAILY    pravastatin (PRAVACHOL) 40 MG tablet TAKE 1 TABLET(40 MG) BY MOUTH EVERY DAY    spironolactone (ALDACTONE) 25 MG tablet TAKE 1 TABLET (25 MG TOTAL) BY MOUTH 2 (TWO) TIMES DAILY.    tamsulosin (FLOMAX) 0.4 mg Cap Take 1 capsule (0.4 mg total) by mouth once daily.     No current facility-administered medications for this visit.        Review of Systems   Constitutional: Negative for activity change, chills, fatigue, fever and unexpected weight change.   Eyes: Negative for discharge, redness and visual disturbance.   Respiratory: Negative for cough, shortness of breath and wheezing.    Cardiovascular: Negative for chest pain and leg swelling.   Gastrointestinal: Negative for abdominal distention, abdominal pain, constipation, diarrhea, nausea and vomiting.   Genitourinary: Positive for difficulty urinating. Negative for decreased urine volume, discharge, dysuria, flank pain, frequency, hematuria, penile pain, testicular pain and urgency.   Musculoskeletal: Negative for arthralgias, joint swelling and myalgias.   Skin: Negative for color change and rash.   Neurological: Negative for dizziness and light-headedness.   Psychiatric/Behavioral: Negative for behavioral problems and confusion. The patient is not nervous/anxious.        Objective:      Vitals:    03/29/19 0848   BP: 118/60   Weight: 136.1 kg (300 lb)   Height: 5' 8" (1.727 m)     Physical Exam   Constitutional: He is oriented to person, place, and time. He appears well-developed.   HENT:   Head: Normocephalic and atraumatic.   Nose: Nose normal.   Eyes: Conjunctivae are normal. Right eye exhibits no discharge. Left eye exhibits no discharge.   Neck: Normal range of motion. Neck supple. No tracheal " deviation present. No thyromegaly present.   Cardiovascular: Normal rate and regular rhythm.    Pulmonary/Chest: Effort normal. No respiratory distress. He has no wheezes.   Abdominal: Soft. He exhibits no distension. There is no hepatosplenomegaly. There is no tenderness. There is no CVA tenderness. No hernia.   Genitourinary:   Genitourinary Comments: Jaimes draining yellow urine   Musculoskeletal: Normal range of motion. He exhibits no edema.   Walking boot to RLE   Neurological: He is alert and oriented to person, place, and time.   Skin: Skin is warm and dry. No rash noted. No erythema.     Psychiatric: He has a normal mood and affect. His behavior is normal. Judgment normal.       Urine dipstick shows not done--jaimes in place    Voiding Trial: 220cc instilled, 0cc voided    16 Fr Coude catheter inserted by nurse using sterile technique under my supervision. Patient tolerated well without any immediate complications    Assessment:       1. Urinary retention    2. Gross hematuria    3. BPH with obstruction/lower urinary tract symptoms          Plan:       1. Urinary retention  -Jaimes placed with 2L UOP  - tamsulosin (FLOMAX) 0.4 mg Cap; Take 1 capsule (0.4 mg total) by mouth once daily.  Dispense: 30 capsule; Refill: 11  - Voiding Trial today--unsuccessful  -Continue Flomax  -Adequate hydration  -Avoid/treat constipation  - Insert jaimes catheter    2. Gross hematuria  - Discussed etiology and workup of hematuria  -+ history of tobacco use   - CT urogram   - Office cystoscopy will hold for now. He may need cysto +/- urodynamics in the future if he is unable to void  - CT Urogram Abd Pelvis W WO; Future    3. BPH with obstruction/lower urinary tract symptoms    - tamsulosin (FLOMAX) 0.4 mg Cap; Take 1 capsule (0.4 mg total) by mouth once daily.  Dispense: 30 capsule; Refill: 11            Follow up in about 1 week (around 4/5/2019) for voiding trial.

## 2019-04-01 ENCOUNTER — TELEPHONE (OUTPATIENT)
Dept: UROLOGY | Facility: CLINIC | Age: 74
End: 2019-04-01

## 2019-04-01 ENCOUNTER — OFFICE VISIT (OUTPATIENT)
Dept: UROLOGY | Facility: CLINIC | Age: 74
End: 2019-04-01
Payer: MEDICARE

## 2019-04-01 VITALS
BODY MASS INDEX: 41.68 KG/M2 | DIASTOLIC BLOOD PRESSURE: 70 MMHG | WEIGHT: 275 LBS | SYSTOLIC BLOOD PRESSURE: 100 MMHG | HEIGHT: 68 IN

## 2019-04-01 DIAGNOSIS — T83.9XXA PROBLEM WITH FOLEY CATHETER, INITIAL ENCOUNTER: Primary | ICD-10-CM

## 2019-04-01 DIAGNOSIS — R33.9 URINARY RETENTION: ICD-10-CM

## 2019-04-01 DIAGNOSIS — R31.0 GROSS HEMATURIA: ICD-10-CM

## 2019-04-01 PROCEDURE — 99213 OFFICE O/P EST LOW 20 MIN: CPT | Mod: PBBFAC,25 | Performed by: NURSE PRACTITIONER

## 2019-04-01 PROCEDURE — 51798 US URINE CAPACITY MEASURE: CPT | Mod: PBBFAC | Performed by: NURSE PRACTITIONER

## 2019-04-01 PROCEDURE — 99214 OFFICE O/P EST MOD 30 MIN: CPT | Mod: S$PBB,,, | Performed by: NURSE PRACTITIONER

## 2019-04-01 PROCEDURE — 99999 PR PBB SHADOW E&M-EST. PATIENT-LVL III: ICD-10-PCS | Mod: PBBFAC,,, | Performed by: NURSE PRACTITIONER

## 2019-04-01 PROCEDURE — 99214 PR OFFICE/OUTPT VISIT, EST, LEVL IV, 30-39 MIN: ICD-10-PCS | Mod: S$PBB,,, | Performed by: NURSE PRACTITIONER

## 2019-04-01 PROCEDURE — 99999 PR PBB SHADOW E&M-EST. PATIENT-LVL III: CPT | Mod: PBBFAC,,, | Performed by: NURSE PRACTITIONER

## 2019-04-01 NOTE — TELEPHONE ENCOUNTER
If no urine drainage in bag after he increases his fluids. Have patient come in for jaimes catheter check with either myself or Violet this afternoon. Thanks

## 2019-04-01 NOTE — TELEPHONE ENCOUNTER
----- Message from Shade Washington sent at 4/1/2019 10:45 AM CDT -----  Contact: Per [phone]  Type:  Needs Medical Advice    Who Called: Per     Symptoms (please be specific): The patient's catheter has not produced urine within the last 24 hours. He wants to know what he should do. The patient has reported that he is battling a stomach virus at this point    How long has patient had these symptoms:  24 hours    Would the patient rather a call back or a response via [facility]? Call back    Best Call Back Number: [phone]

## 2019-04-01 NOTE — TELEPHONE ENCOUNTER
----- Message from Abdulkadir Lemon sent at 4/1/2019 12:28 PM CDT -----  Contact: Self/[phone]  The patient returned the staff call.            Thank you

## 2019-04-01 NOTE — TELEPHONE ENCOUNTER
Pt states still no urine draining in the catheter he states he only drank about 48 ounces of fluid since 10:45 am. I advised pt to come into the office for a pvr.-aishwarya

## 2019-04-01 NOTE — PROGRESS NOTES
Subjective:       Patient ID: John E Sandifer is a 73 y.o. male who was last seen in this office 3/29/2019    Chief Complaint:   Chief Complaint   Patient presents with    Other     Says catheter is not draining or could be his bladder .. he has no pressure or pain but nothing seems to be draining in the bed       Urinary Retention  Patient complains of urinary retention. Onset of retention was several days ago and was sudden in onset. Patient currently does have a urinary catheter in place.  2L of urine were drained when catheter was placed. Prior to this event voiding symptoms consisted of slow stream, intermittency, nocturia x 2. Prior treatments include none. Recent medications that may have affected his voiding include none.  Denies prior occurrences of urinary retention.    He presented to the ER on 3/21/19 with a 2 day history of urinary retention. Jaimes placed in the ER and patient started on Flomax x 10 days.       Hematuria  Patient complains of gross hematuria. Onset of hematuria was several days ago and was sudden in onset. There is not a history of nephrolithiasis. There is not a history of urologic trauma. Other urologic symptoms include slow stream, intermittency, nocturia. Patient admits to history of tobacco use. Patient denies history of Agent Orange exposure, chronic Jaimes catheter,  surgeries, sexually transmitted diseases, trauma and urolithiasis. Prior workup has been none.    Patient presented back to the ER the following day with c/o gross hematuria after having jaimes placed. He denies any difficulty or trauma with jaimes placement.  Jaimes catheter noted to be obstructed with blood clots. Catheter was irrigated and replaced in the ER.  He has not had any further occurrences of gross hematuria since this time    He had a voiding trial in this office on 3/29/19 that was unsuccessful. Jaimes was replaced. Today he reports urine has not been draining in jaimes bag for about 2 days. Reports  "limited intake of fluids especially water recently d/t "stomach virus." He tells me that he has only been drinking Gatorade. He denies suprapubic pressure/pain or flank pain. Denies gross hematuria.     Bladder scan--0 ml    ACTIVE MEDICAL ISSUES:  Patient Active Problem List   Diagnosis    A-fib    HTN (hypertension)    HLD (hyperlipidemia)    NELLY (obstructive sleep apnea)    Obesity    Essential hypertension, benign    Obstructive chronic bronchitis with exacerbation    Anticoagulated on Coumadin    COPD (chronic obstructive pulmonary disease)    Knee pain    OA (osteoarthritis) of knee    Chronic bronchitis    Body mass index 40.0-44.9, adult    Abnormal liver ultrasound    Chronic sinusitis    Epistaxis, recurrent    Encounter for current long-term use of anticoagulants    Allergic rhinitis    Conjunctivitis of left eye    SOB (shortness of breath)    Essential hypertension    Chronic a-fib    Pre-op evaluation    Deviated nasal septum    HTN, goal below 140/90    Left hip pain    Acute pain    Right leg pain    Gouty arthritis of right great toe    Acute gout of right ankle    Urinary retention    Gross hematuria    BPH with obstruction/lower urinary tract symptoms       ALLERGIES AND MEDICATIONS: updated and reviewed.  Review of patient's allergies indicates:   Allergen Reactions    Bee sting [allergen ext-venom-honey bee]     Culver clement (solidago canadensis)     Kenalog [triamcinolone acetonide]     Lidocaine      Current Outpatient Medications   Medication Sig    ADVAIR DISKUS 250-50 mcg/dose diskus inhaler INHALE 1 PUFF BY MOUTH INTO THE LUNGS TWICE DAILY    albuterol (PROAIR HFA) 90 mcg/actuation inhaler INHALE 2 PUFFS INTO THE LUNGS EVERY 6 (SIX) HOURS AS NEEDED FOR WHEEZING.    albuterol (PROVENTIL) 2.5 mg /3 mL (0.083 %) nebulizer solution Take 2.5 mg by nebulization every 6 (six) hours as needed for Wheezing. Rescue    allopurinol (ZYLOPRIM) 100 MG tablet TAKE 1 " TABLET BY MOUTH EVERY DAY    amLODIPine (NORVASC) 10 MG tablet TAKE 1 TABLET BY MOUTH EVERY DAY    diclofenac (CATAFLAM) 50 MG tablet TAKE 1 TABLET(50 MG) BY MOUTH DAILY AS NEEDED    GLUCOSAMINE/D3/BOSWELLIA TOO (OSTEO BI-FLEX, 5-LOXIN, ORAL) Take 2 tablets by mouth every evening.    losartan (COZAAR) 100 MG tablet TAKE 1 TABLET BY MOUTH ONCE DAILY    meloxicam (MOBIC) 7.5 MG tablet Take 1 tablet (7.5 mg total) by mouth once daily.    metoprolol tartrate (LOPRESSOR) 50 MG tablet Take 1 tablet (50 mg total) by mouth 2 (two) times daily.    miscellaneous medical supply Kit 1 Units by Misc.(Non-Drug; Combo Route) route nightly.    nebulizer and compressor Natalya 1 Units by Misc.(Non-Drug; Combo Route) route once daily.    omeprazole (PRILOSEC) 20 MG capsule TAKE 1 CAPSULE(20 MG) BY MOUTH TWICE DAILY    pravastatin (PRAVACHOL) 40 MG tablet TAKE 1 TABLET(40 MG) BY MOUTH EVERY DAY    spironolactone (ALDACTONE) 25 MG tablet TAKE 1 TABLET (25 MG TOTAL) BY MOUTH 2 (TWO) TIMES DAILY.    tamsulosin (FLOMAX) 0.4 mg Cap Take 1 capsule (0.4 mg total) by mouth once daily.     No current facility-administered medications for this visit.        Review of Systems   Constitutional: Negative for activity change, chills, fatigue, fever and unexpected weight change.   Eyes: Negative for discharge, redness and visual disturbance.   Respiratory: Negative for cough, shortness of breath and wheezing.    Cardiovascular: Negative for chest pain and leg swelling.   Gastrointestinal: Negative for abdominal distention, abdominal pain, constipation, diarrhea, nausea and vomiting.   Genitourinary: Positive for decreased urine volume. Negative for discharge, dysuria, flank pain, frequency, hematuria, penile pain, penile swelling and urgency.   Musculoskeletal: Negative for arthralgias, joint swelling and myalgias.   Skin: Negative for color change and rash.   Neurological: Negative for dizziness and light-headedness.  "  Psychiatric/Behavioral: Negative for behavioral problems and confusion. The patient is not nervous/anxious.        Objective:      Vitals:    04/01/19 1430   BP: 100/70   Weight: 124.7 kg (275 lb)   Height: 5' 8" (1.727 m)     Physical Exam   Constitutional: He is oriented to person, place, and time. He appears well-developed.   HENT:   Head: Normocephalic and atraumatic.   Nose: Nose normal.   Eyes: Conjunctivae are normal. Right eye exhibits no discharge. Left eye exhibits no discharge.   Neck: Normal range of motion. Neck supple. No tracheal deviation present. No thyromegaly present.   Cardiovascular: Normal rate and regular rhythm.    Pulmonary/Chest: Effort normal. No respiratory distress. He has no wheezes.   Abdominal: Soft. He exhibits no distension. There is no hepatosplenomegaly. There is no tenderness. There is no CVA tenderness. No hernia.   Genitourinary:   Genitourinary Comments: Jaimes--small amount of dark ruby urine noted to drainage bag   Musculoskeletal: Normal range of motion. He exhibits no edema.   Neurological: He is alert and oriented to person, place, and time.   Skin: Skin is warm and dry. No rash noted. No erythema.     Psychiatric: He has a normal mood and affect. His behavior is normal. Judgment normal.       Urine dipstick shows not done--jaimes in place    Jaimes irrigated with 200cc of sterile water by myself without difficulty. 200cc of yellow urine returned. No leakage noted during irrigation. No obstruction noted. Patient tolerated well    Assessment:       1. Problem with Jaimes catheter, initial encounter    2. Urinary retention    3. Gross hematuria          Plan:       1. Problem with Jaimes catheter, initial encounter  -Jaimes irrigated without difficulty  -Adequate hydration. Recommend patient increase intake of water  - POCT Bladder Scan    2. Urinary retention  -Jaimes placed with 2L UOP  -Previous voiding trial 3/29/19--unsuccessful. Jaimes replaced  -Adequate " hydration  -Avoid/treat constipation  -Continue Flomax    3. Gross hematuria  - Discussed etiology and workup of hematuria  -+ history of tobacco use   - CT urogram ordered   - Office cystoscopy will hold for now. He may need cysto +/- urodynamics in the future if he is unable to void            Follow up in about 4 days (around 4/5/2019) for voiding trial.

## 2019-04-01 NOTE — TELEPHONE ENCOUNTER
Spoke to pt he states he caught a stomach virus over the weekend an hasnt been able to keep anything down. He states he only drink one bottle a gator aide a day an catheter has drain since Saturday. I advised pt that we needed him to start drinking lots of water from now until 1pm call us an let us know if catheter started draining. Pt states he is not in distress,not having fever,chills or pain.  I advised him I would send a message to luis Fleming to see if she would like to add anything else for care. Pt states will start drinking.-[email or web address]

## 2019-04-05 ENCOUNTER — HOSPITAL ENCOUNTER (INPATIENT)
Facility: HOSPITAL | Age: 74
LOS: 6 days | Discharge: HOME | DRG: 377 | End: 2019-04-11
Attending: EMERGENCY MEDICINE | Admitting: INTERNAL MEDICINE
Payer: MEDICARE

## 2019-04-05 ENCOUNTER — ANESTHESIA (OUTPATIENT)
Dept: ENDOSCOPY | Facility: HOSPITAL | Age: 74
DRG: 377 | End: 2019-04-05
Payer: MEDICARE

## 2019-04-05 ENCOUNTER — OFFICE VISIT (OUTPATIENT)
Dept: UROLOGY | Facility: CLINIC | Age: 74
DRG: 377 | End: 2019-04-05
Payer: MEDICARE

## 2019-04-05 ENCOUNTER — ANESTHESIA EVENT (OUTPATIENT)
Dept: ENDOSCOPY | Facility: HOSPITAL | Age: 74
DRG: 377 | End: 2019-04-05
Payer: MEDICARE

## 2019-04-05 VITALS — WEIGHT: 278.88 LBS | HEIGHT: 68 IN | BODY MASS INDEX: 42.27 KG/M2

## 2019-04-05 DIAGNOSIS — E86.0 DEHYDRATION: ICD-10-CM

## 2019-04-05 DIAGNOSIS — R31.0 GROSS HEMATURIA: ICD-10-CM

## 2019-04-05 DIAGNOSIS — N40.0 BENIGN PROSTATIC HYPERPLASIA, UNSPECIFIED WHETHER LOWER URINARY TRACT SYMPTOMS PRESENT: ICD-10-CM

## 2019-04-05 DIAGNOSIS — I48.91 A-FIB: ICD-10-CM

## 2019-04-05 DIAGNOSIS — N28.9 RENAL INSUFFICIENCY: ICD-10-CM

## 2019-04-05 DIAGNOSIS — K92.2 GASTROINTESTINAL HEMORRHAGE: ICD-10-CM

## 2019-04-05 DIAGNOSIS — N40.1 BPH WITH OBSTRUCTION/LOWER URINARY TRACT SYMPTOMS: ICD-10-CM

## 2019-04-05 DIAGNOSIS — K92.2 GASTROINTESTINAL HEMORRHAGE, UNSPECIFIED GASTROINTESTINAL HEMORRHAGE TYPE: Primary | ICD-10-CM

## 2019-04-05 DIAGNOSIS — R06.02 SOB (SHORTNESS OF BREATH): ICD-10-CM

## 2019-04-05 DIAGNOSIS — R33.9 URINARY RETENTION: Primary | ICD-10-CM

## 2019-04-05 DIAGNOSIS — R14.0 ABDOMINAL DISTENSION: ICD-10-CM

## 2019-04-05 DIAGNOSIS — N13.8 BPH WITH OBSTRUCTION/LOWER URINARY TRACT SYMPTOMS: ICD-10-CM

## 2019-04-05 DIAGNOSIS — R33.9 URINARY RETENTION: ICD-10-CM

## 2019-04-05 DIAGNOSIS — R06.02 SHORTNESS OF BREATH: ICD-10-CM

## 2019-04-05 DIAGNOSIS — I10 ESSENTIAL HYPERTENSION: ICD-10-CM

## 2019-04-05 DIAGNOSIS — I10 HYPERTENSION, UNSPECIFIED TYPE: ICD-10-CM

## 2019-04-05 PROBLEM — D62 ACUTE BLOOD LOSS ANEMIA: Status: ACTIVE | Noted: 2019-04-05

## 2019-04-05 PROBLEM — I95.9 HYPOTENSION: Status: ACTIVE | Noted: 2019-04-05

## 2019-04-05 PROBLEM — N17.9 AKI (ACUTE KIDNEY INJURY): Status: ACTIVE | Noted: 2019-04-05

## 2019-04-05 LAB
ABO + RH BLD: NORMAL
ALBUMIN SERPL BCP-MCNC: 3.8 G/DL (ref 3.5–5.2)
ALP SERPL-CCNC: 89 U/L (ref 55–135)
ALT SERPL W/O P-5'-P-CCNC: 23 U/L (ref 10–44)
ANION GAP SERPL CALC-SCNC: 8 MMOL/L (ref 8–16)
APTT BLDCRRT: 29.5 SEC (ref 21–32)
AST SERPL-CCNC: 19 U/L (ref 10–40)
BASOPHILS # BLD AUTO: 0.02 K/UL (ref 0–0.2)
BASOPHILS # BLD AUTO: 0.03 K/UL (ref 0–0.2)
BASOPHILS NFR BLD: 0.3 % (ref 0–1.9)
BASOPHILS NFR BLD: 0.4 % (ref 0–1.9)
BILIRUB SERPL-MCNC: 0.7 MG/DL (ref 0.1–1)
BLD GP AB SCN CELLS X3 SERPL QL: NORMAL
BUN SERPL-MCNC: 72 MG/DL (ref 8–23)
CALCIUM SERPL-MCNC: 9.7 MG/DL (ref 8.7–10.5)
CHLORIDE SERPL-SCNC: 106 MMOL/L (ref 95–110)
CO2 SERPL-SCNC: 16 MMOL/L (ref 23–29)
CREAT SERPL-MCNC: 2.8 MG/DL (ref 0.5–1.4)
DIFFERENTIAL METHOD: ABNORMAL
DIFFERENTIAL METHOD: ABNORMAL
EOSINOPHIL # BLD AUTO: 0.2 K/UL (ref 0–0.5)
EOSINOPHIL # BLD AUTO: 0.2 K/UL (ref 0–0.5)
EOSINOPHIL NFR BLD: 2 % (ref 0–8)
EOSINOPHIL NFR BLD: 2.1 % (ref 0–8)
ERYTHROCYTE [DISTWIDTH] IN BLOOD BY AUTOMATED COUNT: 14.5 % (ref 11.5–14.5)
ERYTHROCYTE [DISTWIDTH] IN BLOOD BY AUTOMATED COUNT: 14.5 % (ref 11.5–14.5)
EST. GFR  (AFRICAN AMERICAN): 25 ML/MIN/1.73 M^2
EST. GFR  (NON AFRICAN AMERICAN): 21 ML/MIN/1.73 M^2
GLUCOSE SERPL-MCNC: 103 MG/DL (ref 70–110)
HCT VFR BLD AUTO: 40.4 % (ref 40–54)
HCT VFR BLD AUTO: 40.5 % (ref 40–54)
HGB BLD-MCNC: 13.4 G/DL (ref 14–18)
HGB BLD-MCNC: 13.6 G/DL (ref 14–18)
INR PPP: 1.1 (ref 0.8–1.2)
LYMPHOCYTES # BLD AUTO: 0.9 K/UL (ref 1–4.8)
LYMPHOCYTES # BLD AUTO: 1.2 K/UL (ref 1–4.8)
LYMPHOCYTES NFR BLD: 11.3 % (ref 18–48)
LYMPHOCYTES NFR BLD: 15.3 % (ref 18–48)
MCH RBC QN AUTO: 27.4 PG (ref 27–31)
MCH RBC QN AUTO: 27.5 PG (ref 27–31)
MCHC RBC AUTO-ENTMCNC: 33.2 G/DL (ref 32–36)
MCHC RBC AUTO-ENTMCNC: 33.6 G/DL (ref 32–36)
MCV RBC AUTO: 82 FL (ref 82–98)
MCV RBC AUTO: 83 FL (ref 82–98)
MONOCYTES # BLD AUTO: 1 K/UL (ref 0.3–1)
MONOCYTES # BLD AUTO: 1 K/UL (ref 0.3–1)
MONOCYTES NFR BLD: 12.6 % (ref 4–15)
MONOCYTES NFR BLD: 13.3 % (ref 4–15)
NEUTROPHILS # BLD AUTO: 5.4 K/UL (ref 1.8–7.7)
NEUTROPHILS # BLD AUTO: 5.9 K/UL (ref 1.8–7.7)
NEUTROPHILS NFR BLD: 68.9 % (ref 38–73)
NEUTROPHILS NFR BLD: 73.8 % (ref 38–73)
PLATELET # BLD AUTO: 187 K/UL (ref 150–350)
PLATELET # BLD AUTO: 199 K/UL (ref 150–350)
PMV BLD AUTO: 12.1 FL (ref 9.2–12.9)
PMV BLD AUTO: 12.7 FL (ref 9.2–12.9)
POTASSIUM SERPL-SCNC: 4.3 MMOL/L (ref 3.5–5.1)
PROT SERPL-MCNC: 7.3 G/DL (ref 6–8.4)
PROTHROMBIN TIME: 11.1 SEC (ref 9–12.5)
RBC # BLD AUTO: 4.87 M/UL (ref 4.6–6.2)
RBC # BLD AUTO: 4.97 M/UL (ref 4.6–6.2)
SODIUM SERPL-SCNC: 130 MMOL/L (ref 136–145)
TROPONIN I SERPL DL<=0.01 NG/ML-MCNC: 0.01 NG/ML (ref 0–0.03)
WBC # BLD AUTO: 7.8 K/UL (ref 3.9–12.7)
WBC # BLD AUTO: 7.93 K/UL (ref 3.9–12.7)

## 2019-04-05 PROCEDURE — C9113 INJ PANTOPRAZOLE SODIUM, VIA: HCPCS | Performed by: EMERGENCY MEDICINE

## 2019-04-05 PROCEDURE — 84484 ASSAY OF TROPONIN QUANT: CPT

## 2019-04-05 PROCEDURE — 85610 PROTHROMBIN TIME: CPT

## 2019-04-05 PROCEDURE — 93010 EKG 12-LEAD: ICD-10-PCS | Mod: ,,, | Performed by: INTERNAL MEDICINE

## 2019-04-05 PROCEDURE — D9220A PRA ANESTHESIA: ICD-10-PCS | Mod: ANES,,, | Performed by: ANESTHESIOLOGY

## 2019-04-05 PROCEDURE — 37000008 HC ANESTHESIA 1ST 15 MINUTES: Performed by: INTERNAL MEDICINE

## 2019-04-05 PROCEDURE — 51700 IRRIGATION OF BLADDER: CPT | Mod: PBBFAC | Performed by: NURSE PRACTITIONER

## 2019-04-05 PROCEDURE — 99214 OFFICE O/P EST MOD 30 MIN: CPT | Mod: S$PBB,25,, | Performed by: NURSE PRACTITIONER

## 2019-04-05 PROCEDURE — 96374 THER/PROPH/DIAG INJ IV PUSH: CPT

## 2019-04-05 PROCEDURE — 99999 PR PBB SHADOW E&M-EST. PATIENT-LVL IV: ICD-10-PCS | Mod: PBBFAC,,, | Performed by: NURSE PRACTITIONER

## 2019-04-05 PROCEDURE — 36415 COLL VENOUS BLD VENIPUNCTURE: CPT

## 2019-04-05 PROCEDURE — 43235 EGD DIAGNOSTIC BRUSH WASH: CPT | Performed by: INTERNAL MEDICINE

## 2019-04-05 PROCEDURE — 99214 PR OFFICE/OUTPT VISIT, EST, LEVL IV, 30-39 MIN: ICD-10-PCS | Mod: S$PBB,25,, | Performed by: NURSE PRACTITIONER

## 2019-04-05 PROCEDURE — 93010 ELECTROCARDIOGRAM REPORT: CPT | Mod: ,,, | Performed by: INTERNAL MEDICINE

## 2019-04-05 PROCEDURE — 37000009 HC ANESTHESIA EA ADD 15 MINS: Performed by: INTERNAL MEDICINE

## 2019-04-05 PROCEDURE — 51700 PR IRRIGATION, BLADDER: ICD-10-PCS | Mod: S$PBB,,, | Performed by: NURSE PRACTITIONER

## 2019-04-05 PROCEDURE — 93005 ELECTROCARDIOGRAM TRACING: CPT

## 2019-04-05 PROCEDURE — 63600175 PHARM REV CODE 636 W HCPCS: Performed by: INTERNAL MEDICINE

## 2019-04-05 PROCEDURE — D9220A PRA ANESTHESIA: Mod: ANES,,, | Performed by: ANESTHESIOLOGY

## 2019-04-05 PROCEDURE — 96376 TX/PRO/DX INJ SAME DRUG ADON: CPT

## 2019-04-05 PROCEDURE — 25000003 PHARM REV CODE 250: Performed by: EMERGENCY MEDICINE

## 2019-04-05 PROCEDURE — 99999 PR PBB SHADOW E&M-EST. PATIENT-LVL IV: CPT | Mod: PBBFAC,,, | Performed by: NURSE PRACTITIONER

## 2019-04-05 PROCEDURE — 85025 COMPLETE CBC W/AUTO DIFF WBC: CPT | Mod: 91

## 2019-04-05 PROCEDURE — D9220A PRA ANESTHESIA: ICD-10-PCS | Mod: CRNA,,, | Performed by: NURSE ANESTHETIST, CERTIFIED REGISTERED

## 2019-04-05 PROCEDURE — 96361 HYDRATE IV INFUSION ADD-ON: CPT

## 2019-04-05 PROCEDURE — 99291 CRITICAL CARE FIRST HOUR: CPT | Mod: 25

## 2019-04-05 PROCEDURE — 21400001 HC TELEMETRY ROOM

## 2019-04-05 PROCEDURE — 63600175 PHARM REV CODE 636 W HCPCS: Performed by: NURSE ANESTHETIST, CERTIFIED REGISTERED

## 2019-04-05 PROCEDURE — 99214 OFFICE O/P EST MOD 30 MIN: CPT | Mod: PBBFAC | Performed by: NURSE PRACTITIONER

## 2019-04-05 PROCEDURE — 51700 IRRIGATION OF BLADDER: CPT | Mod: S$PBB,,, | Performed by: NURSE PRACTITIONER

## 2019-04-05 PROCEDURE — D9220A PRA ANESTHESIA: Mod: CRNA,,, | Performed by: NURSE ANESTHETIST, CERTIFIED REGISTERED

## 2019-04-05 PROCEDURE — 63600175 PHARM REV CODE 636 W HCPCS: Performed by: EMERGENCY MEDICINE

## 2019-04-05 PROCEDURE — 25000003 PHARM REV CODE 250: Performed by: INTERNAL MEDICINE

## 2019-04-05 PROCEDURE — 85730 THROMBOPLASTIN TIME PARTIAL: CPT

## 2019-04-05 PROCEDURE — C9113 INJ PANTOPRAZOLE SODIUM, VIA: HCPCS | Performed by: INTERNAL MEDICINE

## 2019-04-05 PROCEDURE — 86901 BLOOD TYPING SEROLOGIC RH(D): CPT

## 2019-04-05 PROCEDURE — 80053 COMPREHEN METABOLIC PANEL: CPT

## 2019-04-05 RX ORDER — PROPOFOL 10 MG/ML
VIAL (ML) INTRAVENOUS
Status: DISCONTINUED | OUTPATIENT
Start: 2019-04-05 | End: 2019-04-05

## 2019-04-05 RX ORDER — SODIUM CHLORIDE 0.9 % (FLUSH) 0.9 %
10 SYRINGE (ML) INJECTION
Status: DISCONTINUED | OUTPATIENT
Start: 2019-04-05 | End: 2019-04-11 | Stop reason: HOSPADM

## 2019-04-05 RX ORDER — OXYCODONE AND ACETAMINOPHEN 5; 325 MG/1; MG/1
1 TABLET ORAL EVERY 6 HOURS PRN
Status: DISCONTINUED | OUTPATIENT
Start: 2019-04-05 | End: 2019-04-11 | Stop reason: HOSPADM

## 2019-04-05 RX ORDER — PROPOFOL 10 MG/ML
VIAL (ML) INTRAVENOUS
Status: DISPENSED
Start: 2019-04-05 | End: 2019-04-06

## 2019-04-05 RX ORDER — LIDOCAINE HYDROCHLORIDE 20 MG/ML
INJECTION, SOLUTION EPIDURAL; INFILTRATION; INTRACAUDAL; PERINEURAL
Status: DISPENSED
Start: 2019-04-05 | End: 2019-04-06

## 2019-04-05 RX ORDER — PANTOPRAZOLE SODIUM 40 MG/10ML
80 INJECTION, POWDER, LYOPHILIZED, FOR SOLUTION INTRAVENOUS
Status: COMPLETED | OUTPATIENT
Start: 2019-04-05 | End: 2019-04-05

## 2019-04-05 RX ORDER — SODIUM CHLORIDE 9 MG/ML
1000 INJECTION, SOLUTION INTRAVENOUS CONTINUOUS
Status: ACTIVE | OUTPATIENT
Start: 2019-04-05 | End: 2019-04-05

## 2019-04-05 RX ORDER — ALBUTEROL SULFATE 90 UG/1
1 AEROSOL, METERED RESPIRATORY (INHALATION) 4 TIMES DAILY
Status: DISCONTINUED | OUTPATIENT
Start: 2019-04-05 | End: 2019-04-05 | Stop reason: HOSPADM

## 2019-04-05 RX ORDER — LIDOCAINE HCL/PF 100 MG/5ML
SYRINGE (ML) INTRAVENOUS
Status: DISCONTINUED | OUTPATIENT
Start: 2019-04-05 | End: 2019-04-05

## 2019-04-05 RX ADMIN — PANTOPRAZOLE SODIUM 8 MG/HR: 40 INJECTION, POWDER, FOR SOLUTION INTRAVENOUS at 09:04

## 2019-04-05 RX ADMIN — PANTOPRAZOLE SODIUM 8 MG/HR: 40 INJECTION, POWDER, FOR SOLUTION INTRAVENOUS at 05:04

## 2019-04-05 RX ADMIN — SODIUM CHLORIDE 2000 ML: 0.9 INJECTION, SOLUTION INTRAVENOUS at 10:04

## 2019-04-05 RX ADMIN — PROPOFOL 100 MG: 10 INJECTION, EMULSION INTRAVENOUS at 02:04

## 2019-04-05 RX ADMIN — PANTOPRAZOLE SODIUM 8 MG/HR: 40 INJECTION, POWDER, FOR SOLUTION INTRAVENOUS at 11:04

## 2019-04-05 RX ADMIN — LIDOCAINE HYDROCHLORIDE 100 MG: 20 INJECTION, SOLUTION INTRAVENOUS at 02:04

## 2019-04-05 RX ADMIN — SODIUM CHLORIDE: 0.9 INJECTION, SOLUTION INTRAVENOUS at 02:04

## 2019-04-05 RX ADMIN — PANTOPRAZOLE SODIUM 80 MG: 40 INJECTION, POWDER, FOR SOLUTION INTRAVENOUS at 10:04

## 2019-04-05 NOTE — NURSING
Pt admitted to floor via stretcher from EGD, lungs decreased and clear.  IVF's 150cc/hr in progress. Will cont to monitor

## 2019-04-05 NOTE — ED PROVIDER NOTES
Encounter Date: 4/5/2019  This is a SORT/MSE of a 73 y.o. male with afib, HTN, BPH presenting to the ED with c/o hypotension, weakness, and blood in stool x 2 days. Care will be transferred to an alternate provider when patient is roomed for a full evaluation and final disposition. Patient is aware that he/she is awaiting a room in the emergency department, where another provider will review results, evaluate and treat as needed. COLTON Campbell DNP     History   No chief complaint on file.    73 y.o. male Past Medical History:  No date: Abnormal bilirubin test  No date: Arthritis  No date: Atrial fibrillation  No date: Bilateral knee effusions  No date: Bilateral knee pain  No date: Chronic bronchitis  No date: Chronic sinusitis  No date: GERD (gastroesophageal reflux disease)  No date: HTN (hypertension)  6/1/2016: HTN, goal below 130/80  No date: Hyperlipidemia LDL goal < 130  No date: Maxillary sinus polyp  No date: Morbid obesity with BMI of 40.0-44.9, adult  No date: NELLY (obstructive sleep apnea)     Presents for evaluation of brbpr, 7 episodes in last 2 days. Denies black/tarry stools, notes prior ulcer, prior polyps, states hard colectomy done in the past. Denies light headedness/dizziness. He denies abd pain.   Per pts pmd pt takes diclofenac and will not stop despite having been told by his PMD to stop.        Review of patient's allergies indicates:   Allergen Reactions    Bee sting [allergen ext-venom-honey bee]     Culver clement (solidago canadensis)     Kenalog [triamcinolone acetonide]     Lidocaine      Past Medical History:   Diagnosis Date    Abnormal bilirubin test     Arthritis     Atrial fibrillation     Bilateral knee effusions     Bilateral knee pain     Chronic bronchitis     Chronic sinusitis     GERD (gastroesophageal reflux disease)     HTN (hypertension)     HTN, goal below 130/80 6/1/2016    Hyperlipidemia LDL goal < 130     Maxillary sinus polyp     Morbid obesity with BMI of  40.0-44.9, adult     NELLY (obstructive sleep apnea)      Past Surgical History:   Procedure Laterality Date    APPENDECTOMY      Age 55    COLON SURGERY      collectomy    NOSE SURGERY      RECONSTRUCTION-NASAL septum  10/4/2016    Performed by [facility] at [facility] OR    SINUS SURGERY FUNCTIONAL ENDOSCOPIC WITH NAVIGATION N/A 10/4/2016    Performed by [facility] MD at [facility] OR    SPHENOIDECTOMY- FRONTAL-  MAXILLARY- ETHMOID Bilateral 10/4/2016    Performed by Willie Reyes MD at [facility] OR     Family History   Problem Relation Age of Onset    Heart failure Mother     Stroke Father         Hemorrhagic stroke    Heart failure Brother         2 brothers    Rheum arthritis Brother     Hypertension Son      Social History     Tobacco Use    Smoking status: Former Smoker     Packs/day: 1.00     Years: 20.00     Pack years: 20.00     Types: Cigarettes     Last attempt to quit: 9/4/2003     Years since quitting: 15.5    Smokeless tobacco: Never Used   Substance Use Topics    Alcohol use: Yes     Comment: Occasionally    Drug use: No     Review of Systems   Constitutional: Negative for fever.   HENT: Negative for sore throat.    Respiratory: Negative for shortness of breath.    Cardiovascular: Negative for chest pain.   Gastrointestinal: Negative for nausea.   Genitourinary: Negative for dysuria.   Musculoskeletal: Negative for back pain.   Skin: Negative for rash.   Neurological: Negative for weakness.   Hematological: Does not bruise/bleed easily.   All other systems reviewed and are negative.      Physical Exam     Initial Vitals   BP Pulse Resp Temp SpO2   -- -- -- -- --      MAP       --         Physical Exam    Nursing note and vitals reviewed.  Constitutional: He appears well-developed and well-nourished.   HENT:   Head: Normocephalic and atraumatic.   Eyes: EOM are normal. Pupils are equal, round, and reactive to light.   Cardiovascular: Normal rate and regular rhythm.   Pulmonary/Chest: Effort  normal and breath sounds normal.   Abdominal: Soft. He exhibits no distension. There is no tenderness. There is no rebound and no guarding.   Musculoskeletal: He exhibits no edema or tenderness.   Neurological: He is alert and oriented to person, place, and time. No cranial nerve deficit.   Skin: Skin is warm and dry.   Psychiatric: He has a normal mood and affect.     rectal: no stool in vault, yellow mucous present, trace heme +, +external hemorrhoid non thrombosed    ED Course   Procedures  Labs Reviewed - No data to display  EKG Readings: (Independently Interpreted)   Hr 66, afib, nl axis, no thom/twi. Non acute, no stemI.        Imaging Results    None                       Attending Attestation:         Attending Critical Care:   Critical Care Times:   Direct Patient Care (initial evaluation, reassessments, and time considering the case)................................................................30 minutes.   Additional History from reviewing old medical records or taking additional history from the family, EMS, PCP, etc.......................10 minutes.   Ordering, Reviewing, and Interpreting Diagnostic Studies...............................................................................................................10 minutes.   Documentation..................................................................................................................................................................................10 minutes.   Consultation with other Physicians. .................................................................................................................................................10 minutes.   ==============================================================  · Total Critical Care Time - exclusive of procedural time: 70 minutes.  ==============================================================          pts bp 120/80s after 2L ns, pt on protonix drip. hgb noted but will need  serial values. Have placed a gall to gi    Labs Reviewed   CBC W/ AUTO DIFFERENTIAL - Abnormal; Notable for the following components:       Result Value    Hemoglobin 13.6 (*)     Lymph # 0.9 (*)     Gran% 73.8 (*)     Lymph% 11.3 (*)     All other components within normal limits   COMPREHENSIVE METABOLIC PANEL - Abnormal; Notable for the following components:    Sodium 130 (*)     CO2 16 (*)     BUN, Bld 72 (*)     Creatinine 2.8 (*)     eGFR if  25 (*)     eGFR if non  21 (*)     All other components within normal limits   PROTIME-INR   APTT   TROPONIN I   TYPE & SCREEN        I have spoken with Oliva from GI who is aware of the patient.     Clinical Impression:       ICD-10-CM ICD-9-CM   1. Gastrointestinal hemorrhage, unspecified gastrointestinal hemorrhage type K92.2 578.9   2. Shortness of breath R06.02 786.05   3. Dehydration E86.0 276.51   4. Renal insufficiency N28.9 593.9                                Amrita Hawk MD  04/05/19 1207       Amrita Hawk MD  04/05/19 1238

## 2019-04-05 NOTE — [employer] PATIENT INSTRUCTIONS
Discharge Summary/Instructions after an Endoscopic Procedure  Patient Name: John E Sandifer  Patient MRN: [id number]  Patient YOB: 1945 Friday, April 05, 2019  Bertram Curry MD  RESTRICTIONS:  During your procedure today, you received medications for sedation.  These   medications may affect your judgment, balance and coordination.  Therefore,   for 24 hours, you have the following restrictions:   - DO NOT drive a car, operate machinery, make legal/financial decisions,   sign important papers or drink alcohol.    ACTIVITY:  Today: no heavy lifting, straining or running due to procedural   sedation/anesthesia.  The following day: return to full activity including work.  DIET:  Eat and drink normally unless instructed otherwise.     TREATMENT FOR COMMON SIDE EFFECTS:  - Mild abdominal pain, nausea, belching, bloating or excessive gas:  rest,   eat lightly and use a heating pad.  - Sore Throat: treat with throat lozenges and/or gargle with warm salt   water.  - Because air was used during the procedure, expelling large amounts of air   from your rectum or belching is normal.  - If a bowel prep was taken, you may not have a bowel movement for 1-3 days.    This is normal.  SYMPTOMS TO WATCH FOR AND REPORT TO YOUR PHYSICIAN:  1. Abdominal pain or bloating, other than gas cramps.  2. Chest pain.  3. Back pain.  4. Signs of infection such as: chills or fever occurring within 24 hours   after the procedure.  5. Rectal bleeding, which would show as bright red, maroon, or black stools.   (A tablespoon of blood from the rectum is not serious, especially if   hemorrhoids are present.)  6. Vomiting.  7. Weakness or dizziness.  GO DIRECTLY TO THE NEAREST EMERGENCY ROOM IF YOU HAVE ANY OF THE FOLLOWING:      Difficulty breathing              Chills and/or fever over 101 F   Persistent vomiting and/or vomiting blood   Severe abdominal pain   Severe chest pain   Black, tarry stools   Bleeding- more than one  tablespoon   Any other symptom or condition that you feel may need urgent attention  Your doctor recommends these additional instructions:  If any biopsies were taken, your doctors clinic will contact you in 1 to 2   weeks with any results.  - Transport patient to ER.   - Observe patient's clinical course.   - Use Protonix (pantoprazole) 40 mg PO daily.  For questions, problems or results please call your physician - Bertram Curry MD at Work:  [phone].  [facility] can be reached at [phone]     IF A COMPLICATION OR EMERGENCY SITUATION ARISES AND YOU ARE UNABLE TO REACH   YOUR PHYSICIAN - GO DIRECTLY TO THE EMERGENCY ROOM.  Bertram Crury MD  4/5/2019 2:33:15 PM  This report has been verified and signed electronically.  [employer]

## 2019-04-05 NOTE — H&P
[facility]  History & Physical    SUBJECTIVE:     Chief Complaint/Reason for Admission: rectal bleeding     History of Present Illness:    Mr. Sandifer is a pleasant 74 YO gentleman with multiple medical conditions including Afib, HTN, COPD, obesity, as well as OA on chronic use of prn NSAIDs. He was in his [facility] state of health until few weeks ago when he developed hematuria. Over the patst fdw days, not feeling well and began to have bright blood in stool x 2 days. Not improving. Some nausea. Denies changes in appetite or weight loss. Has moderate knee and ankle pain (recent ankle injury) and take NSAIDs. Off Coumadin. In the ED pt was found to have JOHNY and hypotension (given IV fluids)    PTA Medications   Medication Sig    ADVAIR DISKUS 250-50 mcg/dose diskus inhaler INHALE 1 PUFF BY MOUTH INTO THE LUNGS TWICE DAILY    albuterol (PROAIR HFA) 90 mcg/actuation inhaler INHALE 2 PUFFS INTO THE LUNGS EVERY 6 (SIX) HOURS AS NEEDED FOR WHEEZING.    allopurinol (ZYLOPRIM) 100 MG tablet TAKE 1 TABLET BY MOUTH EVERY DAY    amLODIPine (NORVASC) 10 MG tablet TAKE 1 TABLET BY MOUTH EVERY DAY    losartan (COZAAR) 100 MG tablet TAKE 1 TABLET BY MOUTH ONCE DAILY    metoprolol tartrate (LOPRESSOR) 50 MG tablet Take 1 tablet (50 mg total) by mouth 2 (two) times daily.    omeprazole (PRILOSEC) 20 MG capsule TAKE 1 CAPSULE(20 MG) BY MOUTH TWICE DAILY    tamsulosin (FLOMAX) 0.4 mg Cap Take 1 capsule (0.4 mg total) by mouth once daily.    albuterol (PROVENTIL) 2.5 mg /3 mL (0.083 %) nebulizer solution Take 2.5 mg by nebulization every 6 (six) hours as needed for Wheezing. Rescue    diclofenac (CATAFLAM) 50 MG tablet TAKE 1 TABLET(50 MG) BY MOUTH DAILY AS NEEDED    GLUCOSAMINE/D3/BOSWELLIA TOO (OSTEO BI-FLEX, 5-LOXIN, ORAL) Take 2 tablets by mouth every evening.    meloxicam (MOBIC) 7.5 MG tablet Take 1 tablet (7.5 mg total) by mouth once daily.    miscellaneous medical supply Kit 1 Units by  Misc.(Non-Drug; Combo Route) route nightly.    nebulizer and compressor Natalya 1 Units by Misc.(Non-Drug; Combo Route) route once daily.    pravastatin (PRAVACHOL) 40 MG tablet TAKE 1 TABLET(40 MG) BY MOUTH EVERY DAY    spironolactone (ALDACTONE) 25 MG tablet TAKE 1 TABLET (25 MG TOTAL) BY MOUTH 2 (TWO) TIMES DAILY.       Review of patient's allergies indicates:   Allergen Reactions    Bee sting [allergen ext-venom-honey bee]     Culver clement (solidago canadensis)     Kenalog [triamcinolone acetonide]     Lidocaine        Past Medical History:   Diagnosis Date    Abnormal bilirubin test     Arthritis     Atrial fibrillation     Bilateral knee effusions     Bilateral knee pain     Chronic bronchitis     Chronic sinusitis     GERD (gastroesophageal reflux disease)     HTN (hypertension)     HTN, goal below 130/80 6/1/2016    Hyperlipidemia LDL goal < 130     Maxillary sinus polyp     Morbid obesity with BMI of 40.0-44.9, adult     NELLY (obstructive sleep apnea)      Past Surgical History:   Procedure Laterality Date    APPENDECTOMY      Age 55    COLON SURGERY      collectomy    NOSE SURGERY      RECONSTRUCTION-NASAL septum  10/4/2016    [facility]    SINUS SURGERY FUNCTIONAL ENDOSCOPIC WITH NAVIGATION N/A 10/4/2016    [facility]    SPHENOIDECTOMY- FRONTAL-  MAXILLARY- ETHMOID Bilateral 10/4/2016    [facility]     Family History   Problem Relation Age of Onset    Heart failure Mother     Stroke Father         Hemorrhagic stroke    Heart failure Brother         2 brothers    Rheum arthritis Brother     Hypertension Son      Social History     Tobacco Use    Smoking status: Former Smoker     Packs/day: 1.00     Years: 20.00     Pack years: 20.00     Types: Cigarettes     Last attempt to quit: 9/4/2003     Years since quitting: 15.5    Smokeless tobacco: Never Used   Substance Use Topics    Alcohol  use: Yes     Comment: Occasionally    Drug use: No        Review of Systems:    Constitutional: Denies any weigh or appetite changes.   Eyes: no visual changes   ENT: no nasal congestion. Denies sore throat with odynophagia   Respiratory: No cough, SOB, exertional dyspnea or  hemoptysis   Cardiovascular: no chest pain or palpitations   Gastrointestinal: see HPI  Hematologic/Lymphatic: see HPI   Musculoskeletal: chronic knee and ankle pain- uses a cane to ambulate   Neurological: no seizures or tremors, gait or balance prblems  Skin: No rashes or lesions  Psych: Denies any anxiety, depression or insomnia    OBJECTIVE:     Vital Signs (Most Recent):  Temp: 98.3 °F (36.8 °C) (04/05/19 1822)  Pulse: 63 (04/05/19 1822)  Resp: 18 (04/05/19 1822)  BP: 121/60 (04/05/19 1822)  SpO2: 98 % (04/05/19 1538)    Physical Exam:    General: NAD, resting in bed   Head: normocephalic, atraumatic   Eyes: conjunctivae pink, anicteric sclera.   Throat: No erythema or post nasal discharge   Neck: supple, no LAD   Lungs: decreased BS at the bedside   Heart: S1, S2, RRR   Abdomen: obese, hyperactive.   Extremities: no cyanosis or edema.   Skin: turgor normal, no erythema or rashes.   MS: trace pedal edema, viv knee and ankle/foot tenderness   Neuro: A&O x 3   Psy: pleasant, affect is congruent to mood     Laboratory:  CBC:   Recent Labs   Lab 04/05/19  1644   WBC 7.80   RBC 4.87   HGB 13.4*   HCT 40.4      MCV 83   MCH 27.5   MCHC 33.2     CMP:   Recent Labs   Lab 04/05/19  1035      CALCIUM 9.7   ALBUMIN 3.8   PROT 7.3   *   K 4.3   CO2 16*      BUN 72*   CREATININE 2.8*   ALKPHOS 89   ALT 23   AST 19   BILITOT 0.7     Coagulation:   Recent Labs   Lab 04/05/19  1035   LABPROT 11.1   INR 1.1   APTT 29.5     Cardiac markers:   Recent Labs   Lab 04/05/19  1035   TROPONINI 0.007     Microbiology Results (last 7 days)     ** No results found for the last 168 hours. **        No results for input(s): COLORU, CLARITYU,  SPECGRAV, PHUR, PROTEINUA, GLUCOSEU, BILIRUBINCON, BLOODU, WBCU, RBCU, BACTERIA, MUCUS, NITRITE, LEUKOCYTESUR, UROBILINOGEN, HYALINECASTS in the last 168 hours.    Diagnostic Results:      ASSESSMENT/PLAN:     Active Hospital Problems    Diagnosis  POA    *Gastrointestinal hemorrhage [K92.2]  - most probably due to UGI bleeding   - seen by GI  - on protonix drip  - check H&H q 6 h for 24 hours    Yes    Acute blood loss anemia [D62]  - Hg staying around 13 g  - monitor close    Yes    JOHNY (acute kidney injury) [N17.9]  Yes     Due to ATN  - hydration   - labs in the am       Hypotension [I95.9]  - resolved after getting IV fluids in the ED  - hold off on diuretics      Yes      Resolved Hospital Problems   No resolved problems to display.     DVT pro: SCDs    Gen joint pain/Osteoarthritis: No NSAIDs   - prn percocet     Umang Wharton M.D  Internal Medicine & Geriatric Medicine  Hematology & Oncology  Palliative Medicine    [address], [address]  [place], [place] [address] [phone] (Office)  [phone] (Fax)

## 2019-04-05 NOTE — NURSING
Pt educated regarding residual blood may be seen for a few hours after procedure. Pt stable    With protonix gtt and NS@150cc/hr

## 2019-04-05 NOTE — PROGRESS NOTES
Subjective:       Patient ID: John E Sandifer is a 73 y.o. male who was last seen in this office 4/1/2019    Chief Complaint:   Chief Complaint   Patient presents with    Other     VOT.. patient states his pressure is low and he's not feeling well.. no other issues stated      Urinary Retention  Patient complains of urinary retention. Onset of retention was several days ago and was sudden in onset. Patient currently does have a urinary catheter in place.  2L of urine were drained when catheter was placed. Prior to this event voiding symptoms consisted of slow stream, intermittency, nocturia x 2. Prior treatments include none. Recent medications that may have affected his voiding include none.  Denies prior occurrences of urinary retention.    He presented to the ER on 3/21/19 with a 2 day history of urinary retention. Jaimes placed in the ER and patient started on Flomax x 10 days.     Hematuria  Patient complains of gross hematuria. Onset of hematuria was several days ago and was sudden in onset. There is not a history of nephrolithiasis. There is not a history of urologic trauma. Other urologic symptoms include slow stream, intermittency, nocturia. Patient admits to history of tobacco use. Patient denies history of Agent Orange exposure, chronic Jaimes catheter,  surgeries, sexually transmitted diseases, trauma and urolithiasis. Prior workup has been none.    Patient presented back to the ER the following day with c/o gross hematuria after having jaimes placed. He denies any difficulty or trauma with jaimes placement.  Jaimes catheter noted to be obstructed with blood clots. Catheter was irrigated and replaced in the ER.  He has not had any further occurrences of gross hematuria since this time    He had a voiding trial in this office on 3/29/19 that was unsuccessful. Jaimes was replaced. He was seen in this office 3 days ago with c/o decreased UOP via jaimes bag. Jaimes irrigated by myself without difficulty. No  complications noted with jaimes catheter at that time.    He is here today for a repeat voiding trial.  He reports jaimes catheter has been draining well since last visit. He now reports bloody stools (bright red) for the past 2 days. He is also c/o weakness. Denies fever or pain. No new urological complaints    Bladder scan--0 ml (previous visit)    ACTIVE MEDICAL ISSUES:  Patient Active Problem List   Diagnosis    A-fib    HTN (hypertension)    HLD (hyperlipidemia)    NELLY (obstructive sleep apnea)    Obesity    Essential hypertension, benign    Obstructive chronic bronchitis with exacerbation    Anticoagulated on Coumadin    COPD (chronic obstructive pulmonary disease)    Knee pain    OA (osteoarthritis) of knee    Chronic bronchitis    Body mass index 40.0-44.9, adult    Abnormal liver ultrasound    Chronic sinusitis    Epistaxis, recurrent    Encounter for current long-term use of anticoagulants    Allergic rhinitis    Conjunctivitis of left eye    SOB (shortness of breath)    Essential hypertension    Chronic a-fib    Pre-op evaluation    Deviated nasal septum    HTN, goal below 140/90    Left hip pain    Acute pain    Right leg pain    Gouty arthritis of right great toe    Acute gout of right ankle    Urinary retention    Gross hematuria    BPH with obstruction/lower urinary tract symptoms       ALLERGIES AND MEDICATIONS: updated and reviewed.  Review of patient's allergies indicates:   Allergen Reactions    Bee sting [allergen ext-venom-honey bee]     Culver clement (solidago canadensis)     Kenalog [triamcinolone acetonide]     Lidocaine      Current Outpatient Medications   Medication Sig    ADVAIR DISKUS 250-50 mcg/dose diskus inhaler INHALE 1 PUFF BY MOUTH INTO THE LUNGS TWICE DAILY    albuterol (PROAIR HFA) 90 mcg/actuation inhaler INHALE 2 PUFFS INTO THE LUNGS EVERY 6 (SIX) HOURS AS NEEDED FOR WHEEZING.    albuterol (PROVENTIL) 2.5 mg /3 mL (0.083 %) nebulizer solution  Take 2.5 mg by nebulization every 6 (six) hours as needed for Wheezing. Rescue    allopurinol (ZYLOPRIM) 100 MG tablet TAKE 1 TABLET BY MOUTH EVERY DAY    amLODIPine (NORVASC) 10 MG tablet TAKE 1 TABLET BY MOUTH EVERY DAY    diclofenac (CATAFLAM) 50 MG tablet TAKE 1 TABLET(50 MG) BY MOUTH DAILY AS NEEDED    GLUCOSAMINE/D3/BOSWELLIA TOO (OSTEO BI-FLEX, 5-LOXIN, ORAL) Take 2 tablets by mouth every evening.    losartan (COZAAR) 100 MG tablet TAKE 1 TABLET BY MOUTH ONCE DAILY    meloxicam (MOBIC) 7.5 MG tablet Take 1 tablet (7.5 mg total) by mouth once daily.    metoprolol tartrate (LOPRESSOR) 50 MG tablet Take 1 tablet (50 mg total) by mouth 2 (two) times daily.    miscellaneous medical supply Kit 1 Units by Misc.(Non-Drug; Combo Route) route nightly.    nebulizer and compressor Natalya 1 Units by Misc.(Non-Drug; Combo Route) route once daily.    omeprazole (PRILOSEC) 20 MG capsule TAKE 1 CAPSULE(20 MG) BY MOUTH TWICE DAILY    pravastatin (PRAVACHOL) 40 MG tablet TAKE 1 TABLET(40 MG) BY MOUTH EVERY DAY    spironolactone (ALDACTONE) 25 MG tablet TAKE 1 TABLET (25 MG TOTAL) BY MOUTH 2 (TWO) TIMES DAILY.    tamsulosin (FLOMAX) 0.4 mg Cap Take 1 capsule (0.4 mg total) by mouth once daily.     No current facility-administered medications for this visit.        Review of Systems   Constitutional: Positive for fatigue. Negative for activity change, chills, fever and unexpected weight change.   Eyes: Negative for discharge, redness and visual disturbance.   Respiratory: Negative for cough, shortness of breath and wheezing.    Cardiovascular: Negative for chest pain and leg swelling.   Gastrointestinal: Positive for blood in stool. Negative for abdominal distention, abdominal pain, constipation, diarrhea, nausea and vomiting.   Genitourinary: Negative for decreased urine volume, difficulty urinating, discharge, dysuria, flank pain, frequency, hematuria, testicular pain and urgency.   Musculoskeletal: Negative for  "arthralgias, joint swelling and myalgias.   Skin: Negative for color change and rash.   Neurological: Negative for dizziness and light-headedness.   Psychiatric/Behavioral: Negative for behavioral problems and confusion. The patient is not nervous/anxious.        Objective:      Vitals:    04/05/19 0856   Weight: 126.5 kg (278 lb 14.1 oz)   Height: 5' 8" (1.727 m)     Physical Exam   Constitutional: He is oriented to person, place, and time. He appears well-developed.   HENT:   Head: Normocephalic and atraumatic.   Nose: Nose normal.   Eyes: Conjunctivae are normal. Right eye exhibits no discharge. Left eye exhibits no discharge.   Neck: Normal range of motion. Neck supple. No tracheal deviation present. No thyromegaly present.   Cardiovascular: Normal rate and regular rhythm.    Pulmonary/Chest: Effort normal. No respiratory distress. He has no wheezes.   Abdominal: Soft. He exhibits no distension. There is no hepatosplenomegaly. There is no tenderness. There is no CVA tenderness. No hernia.   Genitourinary:   Genitourinary Comments: Jaimes draining clear yellow urine   Musculoskeletal: Normal range of motion. He exhibits no edema.   Neurological: He is alert and oriented to person, place, and time.   Skin: Skin is warm and dry. No rash noted. No erythema.     Psychiatric: He has a normal mood and affect. His behavior is normal. Judgment normal.       Urine dipstick shows not done--jaimes in place.    Voiding Trial: 300cc instilled, 150cc voided. PVR--103 ml    Assessment:       1. Urinary retention    2. Gross hematuria    3. BPH with obstruction/lower urinary tract symptoms          Plan:       1. Urinary retention  -Jaimes placed with 2L UOP  -Previous voiding trial 3/29/19--unsuccessful. Jaimes replaced  - Voiding Trial today--cautiously successful  -I will let him go home.  He will rtc/ER if unable to void again this afternoon  -Adequate hydration  -Avoid/treat constipation  -Continue Flomax    2. Gross " hematuria  - Discussed etiology and workup of hematuria  -+ history of tobacco use   - CT urogram ordered   - Office cystoscopy will hold for now. He may need cysto +/- urodynamics in the future if he is unable to void    3. BPH with obstruction/lower urinary tract symptoms  -Continue flomax      Patient noted to be hypotensive and weak. Patient escorted via wheelchair by nurse to the ER for further evaluation of hematochezia,hypotension and fatigue            Follow up in about 3 weeks (around 4/26/2019) for Follow up PVR.

## 2019-04-05 NOTE — H&P
[facility]  Gastroenterology  H&P    Patient Name: John E Sandifer  MRN: [id number]  Admission Date: 4/5/2019  Code Status: No Order    Attending Provider:   Primary Care Physician: Umang Wharton MD  Principal Problem:<principal problem not specified>    Subjective:     History of Present Illness: This gent presented to the ED complaining of GI blood loss. He painlessly started passing red blood devoid of clots per rectum in modest quantities. No rectal pain and he has a history of diverticulosis and hemorrhoids. His last colon exam was in 2012 and he had issues with GI blood loss then. He had an upper endoscopy and an erosive gastritis was described. He has no acute visceral signs and he has passed blood several times over the past 24 hours. No orthostatic symptoms or signs of visceral ischemia. He has GERD and is morbidly obese. He has hypertension and atrial fibrillation and in the past was anticoagulated. He had severe bleeding episodes then and as such is off anti-coagulants. He has had stable vital signs and he was advised to have EGD to exclude an upper source of blood loss. This procedure comes with high risk as he has severe obesity, COPD and probable obstructive sleep apnea. Risks were explained in detail. No alcohol, tobacco or illegal drugs, and he has no family history of colon cancer ulcers or IBD. No use of NSAID's and he has no signs of portal hypertension or chronic liver disease.    Past Medical History:   Diagnosis Date    Abnormal bilirubin test     Arthritis     Atrial fibrillation     Bilateral knee effusions     Bilateral knee pain     Chronic bronchitis     Chronic sinusitis     GERD (gastroesophageal reflux disease)     HTN (hypertension)     HTN, goal below 130/80 6/1/2016    Hyperlipidemia LDL goal < 130     Maxillary sinus polyp     Morbid obesity with BMI of 40.0-44.9, adult     NELLY (obstructive sleep apnea)        Past Surgical History:   Procedure  Laterality Date    APPENDECTOMY      Age 55    COLON SURGERY      collectomy    NOSE SURGERY      RECONSTRUCTION-NASAL septum  10/4/2016    [facility]    SINUS SURGERY FUNCTIONAL ENDOSCOPIC WITH NAVIGATION N/A 10/4/2016    [facility]    SPHENOIDECTOMY- FRONTAL-  MAXILLARY- ETHMOID Bilateral 10/4/2016    [facility] OR       Review of patient's allergies indicates:   Allergen Reactions    Bee sting [allergen ext-venom-honey bee]     Culver clement (solidago canadensis)     Kenalog [triamcinolone acetonide]     Lidocaine      Family History     Problem Relation (Age of Onset)    Heart failure Mother, Brother    Hypertension Son    Rheum arthritis Brother    Stroke Father        Tobacco Use    Smoking status: Former Smoker     Packs/day: 1.00     Years: 20.00     Pack years: 20.00     Types: Cigarettes     Last attempt to quit: 9/4/2003     Years since quitting: 15.5    Smokeless tobacco: Never Used   Substance and Sexual Activity    Alcohol use: Yes     Comment: Occasionally    Drug use: No    Sexual activity: Yes     Partners: Female     Review of Systems   Constitutional: Positive for activity change. Negative for appetite change, chills, diaphoresis, fatigue, fever and unexpected weight change.   HENT: Negative for congestion, dental problem and facial swelling.    Eyes: Negative for pain, discharge and itching.   Respiratory: Positive for cough. Negative for apnea, choking, chest tightness, shortness of breath, wheezing and stridor.    Cardiovascular: Positive for palpitations. Negative for chest pain and leg swelling.   Gastrointestinal: Positive for blood in stool. Negative for abdominal distention, abdominal pain, anal bleeding, constipation, diarrhea, nausea, rectal pain and vomiting.   Endocrine: Negative for cold intolerance and heat intolerance.   Genitourinary: Positive for difficulty urinating. Negative for  dysuria and flank pain.   Musculoskeletal: Positive for arthralgias, back pain, gait problem, myalgias, neck pain and neck stiffness.   Skin: Negative for color change, pallor, rash and wound.   Neurological: Negative for dizziness, facial asymmetry and light-headedness.   Hematological: Negative for adenopathy. Does not bruise/bleed easily.   Psychiatric/Behavioral: Negative for agitation, behavioral problems, confusion, decreased concentration and dysphoric mood.     Objective:     Vital Signs (Most Recent):  Temp: 97.4 °F (36.3 °C) (04/05/19 1010)  Pulse: 78 (04/05/19 1201)  Resp: 20 (04/05/19 1201)  BP: (!) 126/59 (04/05/19 1201)  SpO2: 97 % (04/05/19 1113) Vital Signs (24h Range):  Temp:  [97.4 °F (36.3 °C)] 97.4 °F (36.3 °C)  Pulse:  [64-78] 78  Resp:  [18-22] 20  SpO2:  [96 %-97 %] 97 %  BP: ()/(50-59) 126/59     Weight: 125.6 kg (277 lb) (04/05/19 1010)  Body mass index is 40.91 kg/m².      Intake/Output Summary (Last 24 hours) at 4/5/2019 1401  Last data filed at 4/5/2019 1201  Gross per 24 hour   Intake 2000 ml   Output --   Net 2000 ml       Lines/Drains/Airways     Drain                 Urethral Catheter 03/22/19 2015 Coude 22 Fr. 13 days          Peripheral Intravenous Line                 Peripheral IV - Single Lumen 03/22/19 1900 Right Antecubital 13 days         Peripheral IV - Single Lumen 04/05/19 1120 Left Antecubital less than 1 day         Peripheral IV - Single Lumen 04/05/19 Right Hand less than 1 day                Physical Exam   Constitutional: He is oriented to person, place, and time. He appears well-developed and well-nourished. No distress.   HENT:   Head: Normocephalic and atraumatic.   Mouth/Throat: No oropharyngeal exudate.   Eyes: Pupils are equal, round, and reactive to light. EOM are normal.   Neck: Normal range of motion. Neck supple.   Cardiovascular: Normal rate and regular rhythm.   Murmur heard.  Pulmonary/Chest: Effort normal and breath sounds normal. No stridor. No  respiratory distress. He has no wheezes. He has no rales. He exhibits no tenderness.   Abdominal: Soft. Bowel sounds are normal. He exhibits distension. He exhibits no mass. There is no tenderness. There is no rebound and no guarding. No hernia.   Musculoskeletal: He exhibits deformity. He exhibits no edema or tenderness.   Neurological: He is alert and oriented to person, place, and time. No cranial nerve deficit. Coordination normal.   Skin: Skin is warm and dry. No rash noted. He is not diaphoretic. No erythema.   Psychiatric: He has a normal mood and affect. His behavior is normal. Judgment and thought content normal.   Nursing note and vitals reviewed.      Significant Labs:  Labs as reoprted    Significant Imaging:  Reviewed prior endoscopic data.    Assessment/Plan: This gent presents with GI blood loss suspicious for a colonic source such as diverticulosis. He has a history of gastric ulcers in the remote past and he has UGI symptoms in the form of GERD. He is hemodynamically stable at this time. EGD was explained as were enhanced risk factors related to his cardiac arrhythmia and pulmonary status. Discussed with anesthesia and nursing staff.     Active Diagnoses:    Diagnosis Date Noted POA    Gastrointestinal hemorrhage [K92.2] 04/05/2019 Yes      Problems Resolved During this Admission:           Bertram Curry MD  Gastroenterology  [facility]

## 2019-04-05 NOTE — BRIEF OP NOTE
EGD:  1) Normal esophagus  2) Mild antral gastritis, no ulcers and no signs of potential bleeding sites  3) Mild duodenitis with no stigmata of recent blood loss.    Suspect colon source of blood loss.  Observation and serial labs are reasonable.

## 2019-04-05 NOTE — ANESTHESIA PREPROCEDURE EVALUATION
04/05/2019    Pre-operative evaluation for Procedure(s) (LRB):  EGD (ESOPHAGOGASTRODUODENOSCOPY) (N/A)    John E Sandifer is a 73 y.o. male     Patient Active Problem List   Diagnosis    A-fib    HTN (hypertension)    HLD (hyperlipidemia)    NELLY (obstructive sleep apnea)    Obesity    Essential hypertension, benign    Obstructive chronic bronchitis with exacerbation    Anticoagulated on Coumadin    COPD (chronic obstructive pulmonary disease)    Knee pain    OA (osteoarthritis) of knee    Chronic bronchitis    Body mass index 40.0-44.9, adult    Abnormal liver ultrasound    Chronic sinusitis    Epistaxis, recurrent    Encounter for current long-term use of anticoagulants    Allergic rhinitis    Conjunctivitis of left eye    SOB (shortness of breath)    Essential hypertension    Chronic a-fib    Pre-op evaluation    Deviated nasal septum    HTN, goal below 140/90    Left hip pain    Acute pain    Right leg pain    Gouty arthritis of right great toe    Acute gout of right ankle    Urinary retention    Gross hematuria    BPH with obstruction/lower urinary tract symptoms    Gastrointestinal hemorrhage         VITALS  Vitals:    04/05/19 1201   BP: (!) 126/59   Pulse: 78   Resp: 20   Temp:      LABS  CBC:   Recent Labs     04/05/19  1035   WBC 7.93   RBC 4.97   HGB 13.6*   HCT 40.5      MCV 82   MCH 27.4   MCHC 33.6       CMP:   Recent Labs     04/05/19  1035   *   K 4.3      CO2 16*   BUN 72*   CREATININE 2.8*      CALCIUM 9.7   ALBUMIN 3.8   PROT 7.3   ALKPHOS 89   ALT 23   AST 19   BILITOT 0.7       INR  Recent Labs     04/05/19  1035   INR 1.1   APTT 29.5           2D Echo:  Results for orders placed or performed during the hospital encounter of 09/17/13   2D Echo w/ Color Flow Doppler   Result Value Ref Range    QEF 55     Mitral Valve Regurgitation  mild          Anesthesia Evaluation    I have reviewed the Patient Summary Reports.     I have reviewed the Medications.     Review of Systems  Anesthesia Hx:  History of prior surgery of interest to airway management or planning: Denies Family Hx of Anesthesia complications.   Denies Personal Hx of Anesthesia complications.   Social:  Non-Smoker, Former Smoker    Hematology/Oncology:  Hematology Normal   Oncology Normal     EENT/Dental:EENT/Dental Normal   Cardiovascular:   Hypertension Denies MI.  Dysrhythmias atrial fibrillation     WEBSTER    Pulmonary:   COPD Asthma Shortness of breath Sleep Apnea    Renal/:  Renal/ Normal     Hepatic/GI:   GERD Denies Liver Disease. Denies Hepatitis. BRBPR   Musculoskeletal:   Arthritis     Neurological:  Neurology Normal  Denies CVA.    Endocrine:  Endocrine Normal  Metabolic Disorders, Obesity / BMI > 30  Psych:  Psychiatric Normal           Physical Exam  General:  Obesity    Airway/Jaw/Neck:  Airway Findings: Mouth Opening: Normal Tongue: Normal  General Airway Assessment: Adult, Possible difficult mask airway  Mallampati: III  TM Distance: 4 - 6 cm  Jaw/Neck Findings:  Neck ROM: Normal ROM      Dental:  Dental Findings: In tact   Chest/Lungs:  Chest/Lungs Findings: Normal Respiratory Rate     Heart/Vascular:  Heart Findings: Rate: Normal        Mental Status:  Mental Status Findings:  Cooperative         Anesthesia Plan  Type of Anesthesia, risks & benefits discussed:  Anesthesia Type:  general  Patient's Preference:   Intra-op Monitoring Plan: standard ASA monitors  Intra-op Monitoring Plan Comments:   Post Op Pain Control Plan:   Post Op Pain Control Plan Comments:   Induction:   IV  Beta Blocker:         Informed Consent: Patient understands risks and agrees with Anesthesia plan.  Questions answered. Anesthesia consent signed with patient.  ASA Score: 3     Day of Surgery Review of History & Physical: I have interviewed and examined the patient. I have reviewed the  patient's H&P dated:  There are no significant changes.          Ready For Surgery From Anesthesia Perspective.

## 2019-04-05 NOTE — TRANSFER OF CARE
"Anesthesia Transfer of Care Note    Patient: John E Sandifer    Procedure(s) Performed: Procedure(s) (LRB):  EGD (ESOPHAGOGASTRODUODENOSCOPY) (N/A)    Patient location: GI    Anesthesia Type: general    Transport from OR: Transported from OR on room air with adequate spontaneous ventilation    Post pain: adequate analgesia    Post assessment: no apparent anesthetic complications and tolerated procedure well    Post vital signs: stable    Level of consciousness: sedated and responds to stimulation    Nausea/Vomiting: no nausea/vomiting    Complications: none    Transfer of care protocol was followed      Last vitals:   Visit Vitals  /63   Pulse 72   Temp 36.1 °C (97 °F) (Oral)   Resp 18   Ht 5' 9" (1.753 m)   Wt 125.6 kg (277 lb)   SpO2 97%   BMI 40.91 kg/m²     "

## 2019-04-05 NOTE — ANESTHESIA POSTPROCEDURE EVALUATION
Anesthesia Post Evaluation    Patient: John E Sandifer    Procedure(s) Performed: Procedure(s) (LRB):  EGD (ESOPHAGOGASTRODUODENOSCOPY) (N/A)    Final Anesthesia Type: general  Patient location during evaluation: GI PACU  Patient participation: Yes- Able to Participate  Level of consciousness: awake and alert and oriented  Post-procedure vital signs: reviewed and stable  Pain management: adequate  Airway patency: patent  PONV status at discharge: No PONV  Anesthetic complications: no      Cardiovascular status: blood pressure returned to baseline and hemodynamically stable  Respiratory status: unassisted, spontaneous ventilation and room air  Hydration status: euvolemic  Follow-up not needed.          Vitals Value Taken Time   /63 4/5/2019  2:50 PM   Temp 36.1 °C (97 °F) 4/5/2019  2:35 PM   Pulse 74 4/5/2019  2:50 PM   Resp 18 4/5/2019  2:50 PM   SpO2 95 % 4/5/2019  2:50 PM         No case tracking events are documented in the log.      Pain/Rosaura Score: Rosaura Score: 10 (4/5/2019  2:50 PM)

## 2019-04-06 LAB
ALBUMIN SERPL BCP-MCNC: 3.5 G/DL (ref 3.5–5.2)
ALP SERPL-CCNC: 83 U/L (ref 55–135)
ALT SERPL W/O P-5'-P-CCNC: 26 U/L (ref 10–44)
ANION GAP SERPL CALC-SCNC: 9 MMOL/L (ref 8–16)
AST SERPL-CCNC: 17 U/L (ref 10–40)
BILIRUB SERPL-MCNC: 0.8 MG/DL (ref 0.1–1)
BUN SERPL-MCNC: 44 MG/DL (ref 8–23)
CALCIUM SERPL-MCNC: 9.9 MG/DL (ref 8.7–10.5)
CHLORIDE SERPL-SCNC: 112 MMOL/L (ref 95–110)
CO2 SERPL-SCNC: 19 MMOL/L (ref 23–29)
CREAT SERPL-MCNC: 1.7 MG/DL (ref 0.5–1.4)
EST. GFR  (AFRICAN AMERICAN): 45 ML/MIN/1.73 M^2
EST. GFR  (NON AFRICAN AMERICAN): 39 ML/MIN/1.73 M^2
GLUCOSE SERPL-MCNC: 95 MG/DL (ref 70–110)
HCT VFR BLD AUTO: 36.8 % (ref 40–54)
HCT VFR BLD AUTO: 36.8 % (ref 40–54)
HCT VFR BLD AUTO: 39.1 % (ref 40–54)
HCT VFR BLD AUTO: 40 % (ref 40–54)
HGB BLD-MCNC: 12 G/DL (ref 14–18)
HGB BLD-MCNC: 12.4 G/DL (ref 14–18)
HGB BLD-MCNC: 13 G/DL (ref 14–18)
HGB BLD-MCNC: 13.3 G/DL (ref 14–18)
POTASSIUM SERPL-SCNC: 4.7 MMOL/L (ref 3.5–5.1)
PROT SERPL-MCNC: 7.1 G/DL (ref 6–8.4)
SODIUM SERPL-SCNC: 140 MMOL/L (ref 136–145)

## 2019-04-06 PROCEDURE — 25000003 PHARM REV CODE 250: Performed by: INTERNAL MEDICINE

## 2019-04-06 PROCEDURE — 85018 HEMOGLOBIN: CPT | Mod: 91

## 2019-04-06 PROCEDURE — 63600175 PHARM REV CODE 636 W HCPCS: Performed by: INTERNAL MEDICINE

## 2019-04-06 PROCEDURE — 21400001 HC TELEMETRY ROOM

## 2019-04-06 PROCEDURE — 85014 HEMATOCRIT: CPT | Mod: 91

## 2019-04-06 PROCEDURE — 85014 HEMATOCRIT: CPT

## 2019-04-06 PROCEDURE — 80053 COMPREHEN METABOLIC PANEL: CPT

## 2019-04-06 PROCEDURE — C9113 INJ PANTOPRAZOLE SODIUM, VIA: HCPCS | Performed by: INTERNAL MEDICINE

## 2019-04-06 PROCEDURE — 36415 COLL VENOUS BLD VENIPUNCTURE: CPT

## 2019-04-06 PROCEDURE — 27000190 HC CPAP FULL FACE MASK W/VALVE

## 2019-04-06 PROCEDURE — 99900035 HC TECH TIME PER 15 MIN (STAT)

## 2019-04-06 PROCEDURE — 85018 HEMOGLOBIN: CPT

## 2019-04-06 PROCEDURE — 94660 CPAP INITIATION&MGMT: CPT

## 2019-04-06 RX ADMIN — PANTOPRAZOLE SODIUM 8 MG/HR: 40 INJECTION, POWDER, FOR SOLUTION INTRAVENOUS at 07:04

## 2019-04-06 RX ADMIN — PANTOPRAZOLE SODIUM 8 MG/HR: 40 INJECTION, POWDER, FOR SOLUTION INTRAVENOUS at 11:04

## 2019-04-06 RX ADMIN — PANTOPRAZOLE SODIUM 8 MG/HR: 40 INJECTION, POWDER, FOR SOLUTION INTRAVENOUS at 10:04

## 2019-04-06 RX ADMIN — PANTOPRAZOLE SODIUM 8 MG/HR: 40 INJECTION, POWDER, FOR SOLUTION INTRAVENOUS at 03:04

## 2019-04-06 NOTE — PLAN OF CARE
04/06/19 1245   Discharge Assessment   Assessment Type Discharge Planning Assessment   Confirmed/corrected address and phone number on facesheet? Yes   Assessment information obtained from? Patient;Medical Record   Prior to hospitilization cognitive status: Alert/Oriented   Prior to hospitalization functional status: Assistive Equipment   Current cognitive status: Alert/Oriented   Current Functional Status: Assistive Equipment   Lives With child(chelle), adult   Able to Return to Prior Arrangements yes   Is patient able to care for self after discharge? Yes   Who are your caregiver(s) and their phone number(s)? Peg Og    [phone]   Patient's perception of discharge disposition admitted as an inpatient   Readmission Within the Last 30 Days no previous admission in last 30 days   Patient currently being followed by outpatient case management? No   Patient currently receives any other outside agency services? No   Equipment Currently Used at Home cane, straight   Do you have any problems affording any of your prescribed medications? No   Is the patient taking medications as prescribed? yes   Does the patient have transportation home? Yes   Transportation Anticipated family or friend will provide   Does the patient receive services at the [facility]? No   Discharge Plan A Home with family   Discharge Plan B Home with family   DME Needed Upon Discharge  cane, straight   Patient/Family in Agreement with Plan yes

## 2019-04-06 NOTE — NURSING
Pt had 3 events of bright red blood in stools w/in 6hrs of shift.  H/H 13.4/40.4 @1644  H/H 12.4/36.8 @0003    Pt verbalized hx of sleep apnea using CPAP nightly at home for past 15+ years  spO2 96%       MD notified  Continue to monitor  CPAP ordered

## 2019-04-06 NOTE — PROGRESS NOTES
Progress Note    Admit Date: 4/5/2019   LOS: 1 day     SUBJECTIVE:     Follow-up For:  Rectal bleeding     Interval History    04/06/19: had several bloody stool last night. Hg mild drop. Had EGD yesterday.       Scheduled Meds:  Continuous Infusions:   pantoprazole 40 mg in dextrose 5 % 100 mL infusion (ready to mix system) 8 mg/hr (04/06/19 1036)     PRN Meds:oxyCODONE-acetaminophen, sodium chloride 0.9%    Review of patient's allergies indicates:   Allergen Reactions    Bee sting [allergen ext-venom-honey bee]     Culver clement (solidago canadensis)     Kenalog [triamcinolone acetonide]     Lidocaine        Review of Systems    Constitutional: Denies any weigh or appetite changes.   Eyes: no visual changes   ENT: no nasal congestion. Denies sore throat with odynophagia   Respiratory: No cough, SOB, exertional dyspnea or  hemoptysis   Cardiovascular: no chest pain or palpitations   Gastrointestinal: see HPI  Hematologic/Lymphatic: see HPI   Musculoskeletal: chronic knee and ankle pain- uses a cane to ambulate   Neurological: no seizures or tremors, gait or balance prblems  Skin: No rashes or lesions  Psych: Denies any anxiety, depression or insomnia      OBJECTIVE:     Vital Signs (Most Recent)  Temp: 97.2 °F (36.2 °C) (04/06/19 0737)  Pulse: 95 (04/06/19 0737)  Resp: 19 (04/06/19 0737)  BP: 138/82 (04/06/19 0737)  SpO2: 98 % (04/06/19 0737)    Vital Signs Range (Last 24H):  Temp:  [97 °F (36.1 °C)-98.3 °F (36.8 °C)]   Pulse:  []   Resp:  [13-22]   BP: ()/(52-82)   SpO2:  [95 %-98 %]     I & O (Last 24H):    Intake/Output Summary (Last 24 hours) at 4/6/2019 1042  Last data filed at 4/6/2019 0453  Gross per 24 hour   Intake 2340 ml   Output 1 ml   Net 2339 ml     Physical Exam:    General: NAD, resting in bed   Head: normocephalic, atraumatic   Eyes: conjunctivae pink, anicteric sclera.   Throat: No erythema or post nasal discharge   Neck: supple, no LAD   Lungs: decreased BS at the bedside   Heart:  S1, S2, RRR   Abdomen: obese, hyperactive.   Extremities: no cyanosis or edema.   Skin: turgor normal, no erythema or rashes.   MS: trace pedal edema, vvi knee and ankle/foot tenderness   Neuro: A&O x 3   Psy: pleasant, affect is congruent to mood     Laboratory:  CBC:   Recent Labs   Lab 04/05/19  1644  04/06/19  1205   WBC 7.80  --   --    RBC 4.87  --   --    HGB 13.4*   < > 13.0*   HCT 40.4   < > 39.1*     --   --    MCV 83  --   --    MCH 27.5  --   --    MCHC 33.2  --   --     < > = values in this interval not displayed.     CMP:   Recent Labs   Lab 04/06/19  0527   GLU 95   CALCIUM 9.9   ALBUMIN 3.5   PROT 7.1      K 4.7   CO2 19*   *   BUN 44*   CREATININE 1.7*   ALKPHOS 83   ALT 26   AST 17   BILITOT 0.8     Coagulation:   Recent Labs   Lab 04/05/19  1035   LABPROT 11.1   INR 1.1   APTT 29.5     Cardiac markers:   Recent Labs   Lab 04/05/19  1035   TROPONINI 0.007     Microbiology Results (last 7 days)     ** No results found for the last 168 hours. **        Specimen (12h ago, onward)    None        No results for input(s): COLORU, CLARITYU, SPECGRAV, PHUR, PROTEINUA, GLUCOSEU, BILIRUBINCON, BLOODU, WBCU, RBCU, BACTERIA, MUCUS, NITRITE, LEUKOCYTESUR, UROBILINOGEN, HYALINECASTS in the last 168 hours.    Diagnostic Results:      ASSESSMENT/PLAN:       Active Hospital Problems     Diagnosis   POA    *Gastrointestinal hemorrhage [K92.2]  - most probably due to UGI bleeding   - seen by GI and underwent EGD- no acute finding  - on protonix drip  - check H&H q 6 h for 24 hours- mild drop  - continue to have hematochezia- pt nee C-scope vs. Tagged rbc scan if continue to have rectal bleeding       Yes    Acute blood loss anemia [D62]  - Hg staying around 13 g  - monitor closely      Yes    JOHNY (acute kidney injury) [N17.9]   Yes     - improving   - continue hydration  - on clears   Due to ATN  - hydration   - labs in the am        Hypotension [I95.9]  - resolved after getting IV fluids in  the ED  - hold off on diuretics         Yes       Resolved Hospital Problems   No resolved problems to display.      DVT pro: SCDs     Gen joint pain/Osteoarthritis: No NSAIDs              - prn percocet     Umang Wharton M.D  Internal Medicine & Geriatric Medicine  Hematology & Oncology  Palliative Medicine    [address] [facility], [address]  [place] [address]  [phone] (Office)  [phone] (Fax)

## 2019-04-07 LAB
ABO + RH BLD: NORMAL
BLD GP AB SCN CELLS X3 SERPL QL: NORMAL
BLD PROD TYP BPU: NORMAL
BLD PROD TYP BPU: NORMAL
BLOOD UNIT EXPIRATION DATE: NORMAL
BLOOD UNIT EXPIRATION DATE: NORMAL
BLOOD UNIT TYPE CODE: 7300
BLOOD UNIT TYPE CODE: 7300
BLOOD UNIT TYPE: NORMAL
BLOOD UNIT TYPE: NORMAL
CODING SYSTEM: NORMAL
CODING SYSTEM: NORMAL
DISPENSE STATUS: NORMAL
DISPENSE STATUS: NORMAL
HCT VFR BLD AUTO: 32.8 % (ref 40–54)
HCT VFR BLD AUTO: 34.5 % (ref 40–54)
HCT VFR BLD AUTO: 37.7 % (ref 40–54)
HGB BLD-MCNC: 10.7 G/DL (ref 14–18)
HGB BLD-MCNC: 11.6 G/DL (ref 14–18)
HGB BLD-MCNC: 12.5 G/DL (ref 14–18)
TRANS ERYTHROCYTES VOL PATIENT: NORMAL ML
TRANS ERYTHROCYTES VOL PATIENT: NORMAL ML

## 2019-04-07 PROCEDURE — 36430 TRANSFUSION BLD/BLD COMPNT: CPT

## 2019-04-07 PROCEDURE — 25000003 PHARM REV CODE 250: Performed by: INTERNAL MEDICINE

## 2019-04-07 PROCEDURE — 86901 BLOOD TYPING SEROLOGIC RH(D): CPT

## 2019-04-07 PROCEDURE — 85018 HEMOGLOBIN: CPT | Mod: 91

## 2019-04-07 PROCEDURE — 85014 HEMATOCRIT: CPT | Mod: 91

## 2019-04-07 PROCEDURE — 36415 COLL VENOUS BLD VENIPUNCTURE: CPT

## 2019-04-07 PROCEDURE — 85018 HEMOGLOBIN: CPT

## 2019-04-07 PROCEDURE — 86920 COMPATIBILITY TEST SPIN: CPT

## 2019-04-07 PROCEDURE — P9021 RED BLOOD CELLS UNIT: HCPCS

## 2019-04-07 PROCEDURE — 21400001 HC TELEMETRY ROOM

## 2019-04-07 PROCEDURE — 85014 HEMATOCRIT: CPT

## 2019-04-07 PROCEDURE — 63600175 PHARM REV CODE 636 W HCPCS: Performed by: INTERNAL MEDICINE

## 2019-04-07 PROCEDURE — C9113 INJ PANTOPRAZOLE SODIUM, VIA: HCPCS | Performed by: INTERNAL MEDICINE

## 2019-04-07 RX ORDER — METOPROLOL TARTRATE 1 MG/ML
5 INJECTION, SOLUTION INTRAVENOUS EVERY 6 HOURS PRN
Status: DISCONTINUED | OUTPATIENT
Start: 2019-04-07 | End: 2019-04-11 | Stop reason: HOSPADM

## 2019-04-07 RX ORDER — FUROSEMIDE 10 MG/ML
20 INJECTION INTRAMUSCULAR; INTRAVENOUS ONCE AS NEEDED
Status: COMPLETED | OUTPATIENT
Start: 2019-04-07 | End: 2019-04-07

## 2019-04-07 RX ORDER — HYDROCODONE BITARTRATE AND ACETAMINOPHEN 500; 5 MG/1; MG/1
TABLET ORAL
Status: DISCONTINUED | OUTPATIENT
Start: 2019-04-07 | End: 2019-04-11 | Stop reason: HOSPADM

## 2019-04-07 RX ORDER — POLYETHYLENE GLYCOL 3350, SODIUM SULFATE ANHYDROUS, SODIUM BICARBONATE, SODIUM CHLORIDE, POTASSIUM CHLORIDE 236; 22.74; 6.74; 5.86; 2.97 G/4L; G/4L; G/4L; G/4L; G/4L
4000 POWDER, FOR SOLUTION ORAL ONCE
Status: COMPLETED | OUTPATIENT
Start: 2019-04-07 | End: 2019-04-07

## 2019-04-07 RX ADMIN — PANTOPRAZOLE SODIUM 8 MG/HR: 40 INJECTION, POWDER, FOR SOLUTION INTRAVENOUS at 09:04

## 2019-04-07 RX ADMIN — POLYETHYLENE GLYCOL 3350, SODIUM SULFATE ANHYDROUS, SODIUM BICARBONATE, SODIUM CHLORIDE, POTASSIUM CHLORIDE 4000 ML: 236; 22.74; 6.74; 5.86; 2.97 POWDER, FOR SOLUTION ORAL at 07:04

## 2019-04-07 RX ADMIN — PANTOPRAZOLE SODIUM 8 MG/HR: 40 INJECTION, POWDER, FOR SOLUTION INTRAVENOUS at 07:04

## 2019-04-07 RX ADMIN — METOPROLOL TARTRATE 5 MG: 5 INJECTION, SOLUTION INTRAVENOUS at 07:04

## 2019-04-07 RX ADMIN — FUROSEMIDE 20 MG: 10 INJECTION, SOLUTION INTRAMUSCULAR; INTRAVENOUS at 10:04

## 2019-04-07 NOTE — PROGRESS NOTES
Progress Note    Admit Date: 4/5/2019   LOS: 2 days     SUBJECTIVE:     Follow-up For:  Rectal bleeding     Interval History    04/07/19: continue to have hematochezia. Hg dropped, developed AFib with RVR  04/06/19: had several bloody stool last night. Hg mild drop. Had EGD yesterday.       Scheduled Meds:   polyethylene glycol  4,000 mL Oral Once     Continuous Infusions:   pantoprazole 40 mg in dextrose 5 % 100 mL infusion (ready to mix system) 8 mg/hr (04/07/19 0711)     PRN Meds:sodium chloride, metoprolol, oxyCODONE-acetaminophen, sodium chloride 0.9%    Review of patient's allergies indicates:   Allergen Reactions    Bee sting [allergen ext-venom-honey bee]     Culver clement (solidago canadensis)     Kenalog [triamcinolone acetonide]     Lidocaine        Review of Systems    Constitutional: some fatigue   Eyes: no visual changes   ENT: no nasal congestion. Denies sore throat with odynophagia   Respiratory: No cough, SOB, exertional dyspnea or  hemoptysis   Cardiovascular: NO cp.  Gastrointestinal: see HPI  Hematologic/Lymphatic: see HPI   Musculoskeletal: chronic knee and ankle pain- uses a cane to ambulate   Neurological: no seizures or tremors, gait or balance prblems  Skin: No rashes or lesions  Psych: Denies any anxiety, depression or insomnia      OBJECTIVE:     Vital Signs (Most Recent)  Temp: 98.4 °F (36.9 °C) (04/07/19 1230)  Pulse: 104 (04/07/19 1230)  Resp: 20 (04/07/19 1230)  BP: (!) 157/72 (04/07/19 1230)  SpO2: 95 % (04/07/19 1230)    Vital Signs Range (Last 24H):  Temp:  [97.9 °F (36.6 °C)-98.8 °F (37.1 °C)]   Pulse:  []   Resp:  [18-22]   BP: (117-194)/(57-93)   SpO2:  [91 %-96 %]     I & O (Last 24H):    Intake/Output Summary (Last 24 hours) at 4/7/2019 1427  Last data filed at 4/7/2019 0806  Gross per 24 hour   Intake 828.33 ml   Output --   Net 828.33 ml     Physical Exam:    General: NAD, resting in bed. Dr. Millan in the room   Head: normocephalic, atraumatic   Eyes: conjunctivae  pink, anicteric sclera.   Throat: No erythema or post nasal discharge   Neck: supple, no LAD   Lungs: decreased BS at the bedside   Heart: S1, S2, tachy, irre   Abdomen: obese, hyperactive.   Extremities: no cyanosis or edema.   Skin: turgor normal, no erythema or rashes.   MS: trace pedal edema, viv knee and ankle/foot tenderness   Neuro: A&O x 3   Psy: pleasant, affect is congruent to mood     Laboratory:    CBC:   Recent Labs   Lab 04/05/19  1644  04/07/19  1057   WBC 7.80  --   --    RBC 4.87  --   --    HGB 13.4*   < > 11.6*   HCT 40.4   < > 34.5*     --   --    MCV 83  --   --    MCH 27.5  --   --    MCHC 33.2  --   --     < > = values in this interval not displayed.     CMP:   Recent Labs   Lab 04/06/19  0527   GLU 95   CALCIUM 9.9   ALBUMIN 3.5   PROT 7.1      K 4.7   CO2 19*   *   BUN 44*   CREATININE 1.7*   ALKPHOS 83   ALT 26   AST 17   BILITOT 0.8     Coagulation:   Recent Labs   Lab 04/05/19  1035   LABPROT 11.1   INR 1.1   APTT 29.5     Cardiac markers:   Recent Labs   Lab 04/05/19  1035   TROPONINI 0.007     Microbiology Results (last 7 days)     ** No results found for the last 168 hours. **        Specimen (12h ago, onward)    None        No results for input(s): COLORU, CLARITYU, SPECGRAV, PHUR, PROTEINUA, GLUCOSEU, BILIRUBINCON, BLOODU, WBCU, RBCU, BACTERIA, MUCUS, NITRITE, LEUKOCYTESUR, UROBILINOGEN, HYALINECASTS in the last 168 hours.    Diagnostic Results:      ASSESSMENT/PLAN:       Active Hospital Problems     Diagnosis   POA    *Gastrointestinal hemorrhage [K92.2]  - most probably due to UGI bleeding   - seen by GI and underwent EGD- no acute finding  - on protonix drip  - check H&H q 6 h for   - Hg dropped to 10.7g  - continue to have hematochezia- pt needs C-scope vs. Tagged rbc scan if continue to have rectal bleeding   - discussed with Dr. Millan   - transfuse 2 units prb      Yes    Acute blood loss anemia [D62]  - continues       Yes    JOHNY (acute kidney injury)  [N17.9]   Yes     - improving   - continue hydration  - on clears   Due to ATN  - hydration   - labs in the am        Hypotension [I95.9]  - resolved after getting IV fluids in the ED  - hold off on diuretics         Yes       Resolved Hospital Problems   No resolved problems to display.      DVT pro: SCDs     Gen joint pain/Osteoarthritis: No NSAIDs              - prn percocet     Afib with RVR: mostly due to acute GI bleeding   - Lopressor IV   - discussed with RN and gave parameters         Umang Wharton M.D  Internal Medicine & Geriatric Medicine  Hematology & Oncology  Palliative Medicine    [address], [address]  [place], [place] [id number] [phone] (Office)  [phone] (Fax)

## 2019-04-07 NOTE — PROGRESS NOTES
"Gastroenterology    CC: Hematochezia    HPI 73 y.o. male with persistent intermittent bleeding.  No melena.        Past Medical History:   Diagnosis Date    Abnormal bilirubin test     Arthritis     Atrial fibrillation     Bilateral knee effusions     Bilateral knee pain     Chronic bronchitis     Chronic sinusitis     GERD (gastroesophageal reflux disease)     HTN (hypertension)     HTN, goal below 130/80 6/1/2016    Hyperlipidemia LDL goal < 130     Maxillary sinus polyp     Morbid obesity with BMI of 40.0-44.9, adult     NELLY (obstructive sleep apnea)          Review of Systems  General ROS: negative for chills, fever   Cardiovascular ROS: no chest pain or dyspnea     Physical Examination  BP (!) 118/93   Pulse 107   Temp 98.8 °F (37.1 °C)   Resp 18   Ht 5' 9" (1.753 m)   Wt 127.9 kg (282 lb)   SpO2 (!) 93%   BMI 41.64 kg/m²   General appearance: alert, cooperative, no distress  HENT: Normocephalic, atraumatic, neck symmetrical, no nasal discharge   Eyes: conjunctivae/corneas clear, no icterus, EOM's intact  Lungs: resp non-labored  Heart: regular rate   Abdomen: soft, non-tender; bowel sounds normoactive; no organomegaly  Extremities: extremities symmetric; no clubbing, cyanosis, or edema  Neurologic: Alert and oriented X 3, normal strength, normal coordination and gait    Labs:  H/H relatively stable    Assessment:     Likely diverticular bleeding.  EGD neg.  Bleeding persists, though BP stable.      Plan:   PRBC as needed  Serial H/H  Colonoscopy tomorrow - if he becomes unstable with recurrent significant bleeding, can obtain bleeding scan.  D/W RN.        "

## 2019-04-07 NOTE — NURSING
Mr. Sandifer had approximately 6 bowel movements of dark red clots with minimal fecal matter during shift. H/H dropped from 13 to 10.7 in a twelve hour period. Contacted Dr. Wharton and he ordered 2 units PRBCs with 20 mg furosemide IV in between units. Patient's blood band was damaged and unreadable, so a new type and screen was needed. First unit of PRBCs currently transfusing with no adverse reaction. Patient resting comfortably.

## 2019-04-07 NOTE — NURSING
Bedside shift report received from HARSH Lozano. Communication board has been updated. NAD noted. Will continue to monitor.

## 2019-04-08 ENCOUNTER — ANESTHESIA (OUTPATIENT)
Dept: ENDOSCOPY | Facility: HOSPITAL | Age: 74
DRG: 377 | End: 2019-04-08
Payer: MEDICARE

## 2019-04-08 ENCOUNTER — ANESTHESIA EVENT (OUTPATIENT)
Dept: ENDOSCOPY | Facility: HOSPITAL | Age: 74
DRG: 377 | End: 2019-04-08
Payer: MEDICARE

## 2019-04-08 LAB
ANION GAP SERPL CALC-SCNC: 8 MMOL/L (ref 8–16)
BUN SERPL-MCNC: 18 MG/DL (ref 8–23)
CALCIUM SERPL-MCNC: 10 MG/DL (ref 8.7–10.5)
CHLORIDE SERPL-SCNC: 108 MMOL/L (ref 95–110)
CO2 SERPL-SCNC: 22 MMOL/L (ref 23–29)
CREAT SERPL-MCNC: 1.5 MG/DL (ref 0.5–1.4)
EST. GFR  (AFRICAN AMERICAN): 52 ML/MIN/1.73 M^2
EST. GFR  (NON AFRICAN AMERICAN): 45 ML/MIN/1.73 M^2
GLUCOSE SERPL-MCNC: 107 MG/DL (ref 70–110)
HCT VFR BLD AUTO: 35.4 % (ref 40–54)
HCT VFR BLD AUTO: 37.4 % (ref 40–54)
HCT VFR BLD AUTO: 38.2 % (ref 40–54)
HCT VFR BLD AUTO: 38.4 % (ref 40–54)
HGB BLD-MCNC: 11.7 G/DL (ref 14–18)
HGB BLD-MCNC: 12.6 G/DL (ref 14–18)
HGB BLD-MCNC: 12.7 G/DL (ref 14–18)
HGB BLD-MCNC: 12.8 G/DL (ref 14–18)
POTASSIUM SERPL-SCNC: 4.1 MMOL/L (ref 3.5–5.1)
SODIUM SERPL-SCNC: 138 MMOL/L (ref 136–145)

## 2019-04-08 PROCEDURE — 80048 BASIC METABOLIC PNL TOTAL CA: CPT

## 2019-04-08 PROCEDURE — G0121 COLON CA SCRN NOT HI RSK IND: HCPCS | Performed by: INTERNAL MEDICINE

## 2019-04-08 PROCEDURE — D9220A PRA ANESTHESIA: ICD-10-PCS | Mod: CRNA,,, | Performed by: NURSE ANESTHETIST, CERTIFIED REGISTERED

## 2019-04-08 PROCEDURE — 37000008 HC ANESTHESIA 1ST 15 MINUTES: Performed by: INTERNAL MEDICINE

## 2019-04-08 PROCEDURE — 25000003 PHARM REV CODE 250: Performed by: NURSE ANESTHETIST, CERTIFIED REGISTERED

## 2019-04-08 PROCEDURE — D9220A PRA ANESTHESIA: Mod: CRNA,,, | Performed by: NURSE ANESTHETIST, CERTIFIED REGISTERED

## 2019-04-08 PROCEDURE — D9220A PRA ANESTHESIA: Mod: ANES,,, | Performed by: ANESTHESIOLOGY

## 2019-04-08 PROCEDURE — 63600175 PHARM REV CODE 636 W HCPCS: Performed by: INTERNAL MEDICINE

## 2019-04-08 PROCEDURE — 25000003 PHARM REV CODE 250: Performed by: INTERNAL MEDICINE

## 2019-04-08 PROCEDURE — 36415 COLL VENOUS BLD VENIPUNCTURE: CPT

## 2019-04-08 PROCEDURE — 63600175 PHARM REV CODE 636 W HCPCS: Performed by: NURSE ANESTHETIST, CERTIFIED REGISTERED

## 2019-04-08 PROCEDURE — 37000009 HC ANESTHESIA EA ADD 15 MINS: Performed by: INTERNAL MEDICINE

## 2019-04-08 PROCEDURE — 85018 HEMOGLOBIN: CPT | Mod: 91

## 2019-04-08 PROCEDURE — C9113 INJ PANTOPRAZOLE SODIUM, VIA: HCPCS | Performed by: INTERNAL MEDICINE

## 2019-04-08 PROCEDURE — 99900035 HC TECH TIME PER 15 MIN (STAT)

## 2019-04-08 PROCEDURE — 85014 HEMATOCRIT: CPT | Mod: 91

## 2019-04-08 PROCEDURE — D9220A PRA ANESTHESIA: ICD-10-PCS | Mod: ANES,,, | Performed by: ANESTHESIOLOGY

## 2019-04-08 PROCEDURE — 21400001 HC TELEMETRY ROOM

## 2019-04-08 RX ORDER — FUROSEMIDE 10 MG/ML
20 INJECTION INTRAMUSCULAR; INTRAVENOUS 2 TIMES DAILY
Status: DISCONTINUED | OUTPATIENT
Start: 2019-04-08 | End: 2019-04-10

## 2019-04-08 RX ORDER — SODIUM CHLORIDE 9 MG/ML
INJECTION, SOLUTION INTRAVENOUS CONTINUOUS PRN
Status: DISCONTINUED | OUTPATIENT
Start: 2019-04-08 | End: 2019-04-08

## 2019-04-08 RX ORDER — PROPOFOL 10 MG/ML
VIAL (ML) INTRAVENOUS
Status: DISCONTINUED | OUTPATIENT
Start: 2019-04-08 | End: 2019-04-08

## 2019-04-08 RX ADMIN — SODIUM CHLORIDE: 0.9 INJECTION, SOLUTION INTRAVENOUS at 03:04

## 2019-04-08 RX ADMIN — FUROSEMIDE 20 MG: 10 INJECTION, SOLUTION INTRAMUSCULAR; INTRAVENOUS at 06:04

## 2019-04-08 RX ADMIN — PROPOFOL 20 MG: 10 INJECTION, EMULSION INTRAVENOUS at 03:04

## 2019-04-08 RX ADMIN — FUROSEMIDE 20 MG: 10 INJECTION, SOLUTION INTRAMUSCULAR; INTRAVENOUS at 08:04

## 2019-04-08 RX ADMIN — PROPOFOL 30 MG: 10 INJECTION, EMULSION INTRAVENOUS at 03:04

## 2019-04-08 RX ADMIN — PROPOFOL 50 MG: 10 INJECTION, EMULSION INTRAVENOUS at 03:04

## 2019-04-08 RX ADMIN — PANTOPRAZOLE SODIUM 8 MG/HR: 40 INJECTION, POWDER, FOR SOLUTION INTRAVENOUS at 01:04

## 2019-04-08 RX ADMIN — PANTOPRAZOLE SODIUM 8 MG/HR: 40 INJECTION, POWDER, FOR SOLUTION INTRAVENOUS at 03:04

## 2019-04-08 RX ADMIN — PANTOPRAZOLE SODIUM 8 MG/HR: 40 INJECTION, POWDER, FOR SOLUTION INTRAVENOUS at 08:04

## 2019-04-08 NOTE — NURSING
Attempted to call Endo for patients colonoscopy ETA. No answer x 2. Will follow up as able.    Patient had a small blood streaked bowel movement,. Will continue to monitor.

## 2019-04-08 NOTE — TRANSFER OF CARE
"Anesthesia Transfer of Care Note    Patient: John E Sandifer    Procedure(s) Performed: Procedure(s) (LRB):  COLONOSCOPY (N/A)    Patient location: GI    Anesthesia Type: general    Transport from OR: Transported from OR on room air with adequate spontaneous ventilation    Post pain: adequate analgesia    Post assessment: no apparent anesthetic complications    Post vital signs: stable    Level of consciousness: awake, alert and oriented    Nausea/Vomiting: no nausea/vomiting    Complications: none    Transfer of care protocol was followed      Last vitals:   Visit Vitals  BP (!) 160/81   Pulse 101   Temp 36.6 °C (97.9 °F)   Resp 18   Ht 5' 9" (1.753 m)   Wt 129.7 kg (285 lb 15 oz)   SpO2 95%   BMI 42.23 kg/m²     "

## 2019-04-08 NOTE — PLAN OF CARE
Problem: Adult Inpatient Plan of Care  Goal: Plan of Care Review  Outcome: Ongoing (interventions implemented as appropriate)     04/07/19 2049   Plan of Care Review   Plan of Care Reviewed With patient   Progress improving     Goal: Absence of Hospital-Acquired Illness or Injury    Intervention: Identify and Manage Fall Risk     04/07/19 2049   Optimize Balance and Safe Activity   Safety Promotion/Fall Prevention assistive device/personal item within reach;bed alarm set;medications reviewed;nonskid shoes/socks when out of bed;side rails raised x 2;instructed to call staff for mobility         Problem: Cardiac Output Decreased  Goal: Effective Cardiac Output    Intervention: Optimize Cardiac Output     04/07/19 2049   Monitor/Manage Chemotherapy Gastrointestinal Effects   Environmental Support calm environment promoted   Prevent Additional Skin Injury   Head of Bed (HOB) HOB at 20-30 degrees

## 2019-04-08 NOTE — ANESTHESIA PREPROCEDURE EVALUATION
04/08/2019    Pre-operative evaluation for Procedure(s) (LRB):  EGD (ESOPHAGOGASTRODUODENOSCOPY) (N/A)    John E Sandifer is a 73 y.o. male     Patient Active Problem List   Diagnosis    A-fib    HTN (hypertension)    HLD (hyperlipidemia)    NELLY (obstructive sleep apnea)    Obesity    Essential hypertension, benign    Obstructive chronic bronchitis with exacerbation    Anticoagulated on Coumadin    COPD (chronic obstructive pulmonary disease)    Knee pain    OA (osteoarthritis) of knee    Chronic bronchitis    Body mass index 40.0-44.9, adult    Abnormal liver ultrasound    Chronic sinusitis    Epistaxis, recurrent    Encounter for current long-term use of anticoagulants    Allergic rhinitis    Conjunctivitis of left eye    SOB (shortness of breath)    Essential hypertension    Chronic a-fib    Pre-op evaluation    Deviated nasal septum    HTN, goal below 140/90    Left hip pain    Acute pain    Right leg pain    Gouty arthritis of right great toe    Acute gout of right ankle    Urinary retention    Gross hematuria    BPH with obstruction/lower urinary tract symptoms    Gastrointestinal hemorrhage    Acute blood loss anemia    JOHNY (acute kidney injury)    Hypotension         VITALS  There were no vitals filed for this visit.  LABS  CBC:   Recent Labs     04/05/19  1035 04/05/19  1644  04/07/19  2352 04/08/19  0623   WBC 7.93 7.80  --   --   --    RBC 4.97 4.87  --   --   --    HGB 13.6* 13.4*   < > 12.7* 12.6*   HCT 40.5 40.4   < > 38.2* 37.4*    187  --   --   --    MCV 82 83  --   --   --    MCH 27.4 27.5  --   --   --    MCHC 33.6 33.2  --   --   --     < > = values in this interval not displayed.       CMP:   Recent Labs     04/05/19  1035 04/06/19  0527   * 140   K 4.3 4.7    112*   CO2 16* 19*   BUN 72* 44*   CREATININE 2.8* 1.7*    95    CALCIUM 9.7 9.9   ALBUMIN 3.8 3.5   PROT 7.3 7.1   ALKPHOS 89 83   ALT 23 26   AST 19 17   BILITOT 0.7 0.8       INR  Recent Labs     04/05/19  1035   INR 1.1   APTT 29.5           2D Echo:  Results for orders placed or performed during the hospital encounter of 09/17/13   2D Echo w/ Color Flow Doppler   Result Value Ref Range    QEF 55     Mitral Valve Regurgitation mild          Pre-op Assessment    I have reviewed the Patient Summary Reports.     I have reviewed the Nursing Notes.   I have reviewed the Medications.     Review of Systems  Anesthesia Hx:  History of prior surgery of interest to airway management or planning: Denies Family Hx of Anesthesia complications.   Denies Personal Hx of Anesthesia complications.   Social:  Non-Smoker, Former Smoker    Hematology/Oncology:  Hematology Normal   Oncology Normal     EENT/Dental:EENT/Dental Normal   Cardiovascular:   Hypertension Denies MI.  Dysrhythmias atrial fibrillation     WEBSTER    Pulmonary:   COPD Asthma Shortness of breath Sleep Apnea    Renal/:   Chronic Renal Disease    Hepatic/GI:   GERD Denies Liver Disease. Denies Hepatitis. BRBPR   Musculoskeletal:   Arthritis     Neurological:  Neurology Normal  Denies CVA.    Endocrine:  Endocrine Normal  Metabolic Disorders, Obesity / BMI > 30  Psych:  Psychiatric Normal           Physical Exam  General:  Obesity    Airway/Jaw/Neck:  Airway Findings: Mouth Opening: Normal Tongue: Normal  General Airway Assessment: Adult, Possible difficult mask airway  Mallampati: III  TM Distance: 4 - 6 cm  Jaw/Neck Findings:  Neck ROM: Normal ROM      Dental:  Dental Findings: In tact   Chest/Lungs:  Chest/Lungs Findings: Normal Respiratory Rate     Heart/Vascular:  Heart Findings: Rate: Normal        Mental Status:  Mental Status Findings:  Cooperative         Anesthesia Plan  Type of Anesthesia, risks & benefits discussed:  Anesthesia Type:  MAC, general  Patient's Preference:   Intra-op Monitoring Plan: standard ASA  monitors  Intra-op Monitoring Plan Comments:   Post Op Pain Control Plan: multimodal analgesia, IV/PO Opioids PRN and per primary service following discharge from PACU  Post Op Pain Control Plan Comments:   Induction:   IV  Beta Blocker:  Patient is not currently on a Beta-Blocker (No further documentation required).       Informed Consent: Patient understands risks and agrees with Anesthesia plan.  Questions answered. Anesthesia consent signed with patient.  ASA Score: 3     Day of Surgery Review of History & Physical: I have interviewed and examined the patient. I have reviewed the patient's H&P dated:  There are no significant changes.   H&P completed by Anesthesiologist.       Ready For Surgery From Anesthesia Perspective.

## 2019-04-08 NOTE — PROGRESS NOTES
Progress Note    Admit Date: 4/5/2019   LOS: 3 days     SUBJECTIVE:     Follow-up For:  Rectal bleeding     Interval History    04/08/19: intermittent hematochezia. + Ab. Pain. No fever or chills.   04/07/19: continue to have hematochezia. Hg dropped, developed AFib with RVR  04/06/19: had several bloody stool last night. Hg mild drop. Had EGD yesterday.       Scheduled Meds:   furosemide  20 mg Intravenous BID     Continuous Infusions:   pantoprazole 40 mg in dextrose 5 % 100 mL infusion (ready to mix system) 8 mg/hr (04/08/19 0315)     PRN Meds:sodium chloride, metoprolol, oxyCODONE-acetaminophen, sodium chloride 0.9%    Review of patient's allergies indicates:   Allergen Reactions    Bee sting [allergen ext-venom-honey bee]     Culver clement (solidago canadensis)     Kenalog [triamcinolone acetonide]     Lidocaine        Review of Systems    Constitutional: some fatigue   Eyes: no visual changes   ENT: no nasal congestion. Denies sore throat with odynophagia   Respiratory: No cough, SOB, exertional dyspnea or  hemoptysis   Cardiovascular: NO cp.  Gastrointestinal: see HPI  Hematologic/Lymphatic: see HPI   Musculoskeletal: chronic knee and ankle pain- uses a cane to ambulate   Neurological: no seizures or tremors, gait or balance prblems  Skin: No rashes or lesions  Psych: Denies any anxiety, depression or insomnia      OBJECTIVE:     Vital Signs (Most Recent)  Temp: 98.7 °F (37.1 °C) (04/08/19 0454)  Pulse: 99 (04/08/19 0454)  Resp: 18 (04/08/19 0454)  BP: (!) 145/67 (04/08/19 0454)  SpO2: (!) 93 % (04/08/19 0454)    Vital Signs Range (Last 24H):  Temp:  [98.1 °F (36.7 °C)-98.9 °F (37.2 °C)]   Pulse:  []   Resp:  [18-22]   BP: (116-183)/(63-88)   SpO2:  [93 %-98 %]     I & O (Last 24H):    Intake/Output Summary (Last 24 hours) at 4/8/2019 0751  Last data filed at 4/7/2019 1700  Gross per 24 hour   Intake 1092.08 ml   Output --   Net 1092.08 ml     Physical Exam:    General: NAD, resting in bed.   Head:  normocephalic, atraumatic   Eyes: conjunctivae pink, anicteric sclera.   Throat: No erythema or post nasal discharge   Neck: supple, no LAD   Lungs: decreased BS at the bedside   Heart: S1, S2, tachy  Abdomen: obese, hyperactive.   Extremities: no cyanosis or edema.   Skin: turgor normal, no erythema or rashes.   MS: trace pedal edema, viv knee and ankle/foot tenderness   Neuro: A&O x 3   Psy: calm     Laboratory:    CBC:   Recent Labs   Lab 04/05/19  1644  04/08/19  0623   WBC 7.80  --   --    RBC 4.87  --   --    HGB 13.4*   < > 12.6*   HCT 40.4   < > 37.4*     --   --    MCV 83  --   --    MCH 27.5  --   --    MCHC 33.2  --   --     < > = values in this interval not displayed.     CMP:   Recent Labs   Lab 04/06/19  0527   GLU 95   CALCIUM 9.9   ALBUMIN 3.5   PROT 7.1      K 4.7   CO2 19*   *   BUN 44*   CREATININE 1.7*   ALKPHOS 83   ALT 26   AST 17   BILITOT 0.8     Coagulation:   Recent Labs   Lab 04/05/19  1035   LABPROT 11.1   INR 1.1   APTT 29.5     Cardiac markers:   Recent Labs   Lab 04/05/19  1035   TROPONINI 0.007     Microbiology Results (last 7 days)     ** No results found for the last 168 hours. **        Specimen (12h ago, onward)    None        No results for input(s): COLORU, CLARITYU, SPECGRAV, PHUR, PROTEINUA, GLUCOSEU, BILIRUBINCON, BLOODU, WBCU, RBCU, BACTERIA, MUCUS, NITRITE, LEUKOCYTESUR, UROBILINOGEN, HYALINECASTS in the last 168 hours.    Diagnostic Results:      ASSESSMENT/PLAN:       Active Hospital Problems     Diagnosis   POA    *Gastrointestinal hemorrhage [K92.2]  - most probably due to UGI bleeding   - seen by GI and underwent EGD- no acute finding  - on protonix drip  - check H&H q 6 h for   - Hg dropped to 10.7g  - continue to have hematochezia- pt needs C-scope vs. Tagged rbc scan if continue to have rectal bleeding   - discussed with Dr. Millan   - transfused 2 units prb  - C-scope today       Yes    Acute blood loss anemia [D62]  - continue to monitor        Yes    JOHNY (acute kidney injury) [N17.9]   Yes     - improving   - continue hydration  - on clears   Due to ATN  - hydration   - labs in the am        Hypotension [I95.9]  - resolved after getting IV fluids in the ED  - hold off on diuretics         Yes       Resolved Hospital Problems   No resolved problems to display.      DVT pro: SCDs     Gen joint pain/Osteoarthritis: No NSAIDs              - prn percocet     Afib with RVR: mostly due to acute GI bleeding   - Lopressor IV with parameters         Umang Wharton M.D  Internal Medicine & Geriatric Medicine  Hematology & Oncology  Palliative Medicine    [address], [address]  [place], [place] [id number] [phone] (Office)  [phone] (Fax)

## 2019-04-08 NOTE — ANESTHESIA POSTPROCEDURE EVALUATION
Anesthesia Post Evaluation    Patient: John E Sandifer    Procedure(s) Performed: Procedure(s) (LRB):  COLONOSCOPY (N/A)    Final Anesthesia Type: general  Patient location during evaluation: PACU  Patient participation: Yes- Able to Participate  Level of consciousness: awake and alert, oriented and awake  Post-procedure vital signs: reviewed and stable  Pain management: adequate  Airway patency: patent  PONV status at discharge: No PONV  Anesthetic complications: no      Cardiovascular status: blood pressure returned to baseline, hemodynamically stable and stable  Respiratory status: unassisted and spontaneous ventilation  Hydration status: euvolemic  Follow-up not needed.          Vitals Value Taken Time   /86 4/8/2019  4:04 PM   Temp 36.6 °C (97.9 °F) 4/8/2019  3:35 PM   Pulse 104 4/8/2019  4:04 PM   Resp 20 4/8/2019  4:04 PM   SpO2 95 % 4/8/2019  4:04 PM         Event Time     Out of Recovery 04/08/2019 16:10:54          Pain/Rosaura Score: Rosaura Score: 10 (4/8/2019  4:05 PM)

## 2019-04-08 NOTE — NURSING
Patient arrived back to unit via stretcher. No complaints, no acute distress noted.Will continue to monitor.

## 2019-04-08 NOTE — NURSING
Patient had significantly less blood in stool during this shift compared to previous. GoLytely administered as ordered. Patient rested comfortably all shift with no acute distress noted. Gave report to HARSH Jaime.

## 2019-04-08 NOTE — NURSING
Dr. Wharton was notified that patient ran a fib with rvr of 150s. Dr. Wharton ordered metoprolol 5mg IV PRN for HR >120.

## 2019-04-08 NOTE — NURSING
Report received from HARSH Lozano. Patient resting comfortably in bed, no complaints, no acute distress noted. IV fluids infusing. Will continue to monitor.

## 2019-04-08 NOTE — [employer] PATIENT INSTRUCTIONS
Discharge Summary/Instructions after an Endoscopic Procedure  Patient Name: John E Sandifer  Patient MRN: [id number]  Patient YOB: 1945 Monday, April 08, 2019  Siomara Argueta MD  RESTRICTIONS:  During your procedure today, you received medications for sedation.  These   medications may affect your judgment, balance and coordination.  Therefore,   for 24 hours, you have the following restrictions:   - DO NOT drive a car, operate machinery, make legal/financial decisions,   sign important papers or drink alcohol.    ACTIVITY:  Today: no heavy lifting, straining or running due to procedural   sedation/anesthesia.  The following day: return to full activity including work.  DIET:  Eat and drink normally unless instructed otherwise.     TREATMENT FOR COMMON SIDE EFFECTS:  - Mild abdominal pain, nausea, belching, bloating or excessive gas:  rest,   eat lightly and use a heating pad.  - Sore Throat: treat with throat lozenges and/or gargle with warm salt   water.  - Because air was used during the procedure, expelling large amounts of air   from your rectum or belching is normal.  - If a bowel prep was taken, you may not have a bowel movement for 1-3 days.    This is normal.  SYMPTOMS TO WATCH FOR AND REPORT TO YOUR PHYSICIAN:  1. Abdominal pain or bloating, other than gas cramps.  2. Chest pain.  3. Back pain.  4. Signs of infection such as: chills or fever occurring within 24 hours   after the procedure.  5. Rectal bleeding, which would show as bright red, maroon, or black stools.   (A tablespoon of blood from the rectum is not serious, especially if   hemorrhoids are present.)  6. Vomiting.  7. Weakness or dizziness.  GO DIRECTLY TO THE NEAREST EMERGENCY ROOM IF YOU HAVE ANY OF THE FOLLOWING:      Difficulty breathing              Chills and/or fever over 101 F   Persistent vomiting and/or vomiting blood   Severe abdominal pain   Severe chest pain   Black, tarry stools   Bleeding- more than one  tablespoon   Any other symptom or condition that you feel may need urgent attention  Your doctor recommends these additional instructions:  If any biopsies were taken, your doctors clinic will contact you in 1 to 2   weeks with any results.  - Return patient to [facility] for ongoing care.   - Resume previous diet today.   - Continue present medications.   - To visualize the small bowel, perform video capsule endoscopy as an   outpatient.   - The findings and recommendations were discussed with the patient.   - No repeat colonoscopy due to age.  For questions, problems or results please call your physician - Siomara Argueta MD at Work:  [phone].  [facility] can be reached at [phone]     IF A COMPLICATION OR EMERGENCY SITUATION ARISES AND YOU ARE UNABLE TO REACH   YOUR PHYSICIAN - GO DIRECTLY TO THE EMERGENCY ROOM.  Siomara Argueta MD  4/8/2019 3:34:41 PM  This report has been verified and signed electronically.  [employer]

## 2019-04-08 NOTE — PLAN OF CARE
Problem: Fall Injury Risk  Goal: Absence of Fall and Fall-Related Injury  Outcome: Ongoing (interventions implemented as appropriate)  Intervention: Identify and Manage Contributors to Fall Injury Risk     04/08/19 0747   Manage Acute Allergic Reaction   Medication Review/Management medications reviewed     Intervention: Promote Injury-Free Environment     04/08/19 0747 04/08/19 0930   Optimize Balance and Safe Activity   Safety Promotion/Fall Prevention  --  assistive device/personal item within reach;bed alarm set;bedside commode chair;commode/urinal/bedpan at bedside;Fall Risk reviewed with patient/family;lighting adjusted;medications reviewed;nonskid shoes/socks when out of bed;room near unit station;side rails raised x 3;instructed to call staff for mobility   Optimize [place] and Functional Mobility   Environmental Safety Modification assistive device/personal items within reach;clutter free environment maintained;lighting adjusted;room near unit station;room organization consistent  --          Problem: Adjustment to Illness (Gastrointestinal Bleeding)  Goal: Optimal Coping with Acute Illness  Outcome: Ongoing (interventions implemented as appropriate)  Intervention: Optimize Psychosocial Response to Unexpected Illness     04/08/19 1201   Promote Anxiety Reduction   Supportive Measures active listening utilized;relaxation techniques promoted         Problem: Bleeding (Gastrointestinal Bleeding)  Goal: Hemostasis  Outcome: Ongoing (interventions implemented as appropriate)  Intervention: Manage Gastrointestinal Bleeding     04/08/19 1201   Monitor/Manage Chemotherapy Gastrointestinal Effects   Environmental Support calm environment promoted;environmental consistency promoted;rest periods encouraged   Manage Acute Allergic Reaction   Stabilization Measures fluid resuscitation initiated

## 2019-04-09 LAB
HCT VFR BLD AUTO: 35.4 % (ref 40–54)
HCT VFR BLD AUTO: 35.8 % (ref 40–54)
HGB BLD-MCNC: 11.8 G/DL (ref 14–18)
HGB BLD-MCNC: 11.8 G/DL (ref 14–18)

## 2019-04-09 PROCEDURE — 63600175 PHARM REV CODE 636 W HCPCS: Performed by: INTERNAL MEDICINE

## 2019-04-09 PROCEDURE — 85014 HEMATOCRIT: CPT

## 2019-04-09 PROCEDURE — 99900035 HC TECH TIME PER 15 MIN (STAT)

## 2019-04-09 PROCEDURE — 36415 COLL VENOUS BLD VENIPUNCTURE: CPT

## 2019-04-09 PROCEDURE — 85014 HEMATOCRIT: CPT | Mod: 91

## 2019-04-09 PROCEDURE — 21400001 HC TELEMETRY ROOM

## 2019-04-09 PROCEDURE — 85018 HEMOGLOBIN: CPT

## 2019-04-09 PROCEDURE — 94761 N-INVAS EAR/PLS OXIMETRY MLT: CPT

## 2019-04-09 PROCEDURE — 25500020 PHARM REV CODE 255: Performed by: INTERNAL MEDICINE

## 2019-04-09 PROCEDURE — 85018 HEMOGLOBIN: CPT | Mod: 91

## 2019-04-09 RX ADMIN — FUROSEMIDE 20 MG: 10 INJECTION, SOLUTION INTRAMUSCULAR; INTRAVENOUS at 08:04

## 2019-04-09 RX ADMIN — IOHEXOL 100 ML: 350 INJECTION, SOLUTION INTRAVENOUS at 04:04

## 2019-04-09 RX ADMIN — FUROSEMIDE 20 MG: 10 INJECTION, SOLUTION INTRAMUSCULAR; INTRAVENOUS at 05:04

## 2019-04-09 NOTE — PLAN OF CARE
04/09/19 1155   Discharge Reassessment   Assessment Type Discharge Planning Reassessment   Provided patient/caregiver education on the expected discharge date and the discharge plan No   Do you have any problems affording any of your prescribed medications? No   Discharge Plan A Home with family   Discharge Plan B Home with family   DME Needed Upon Discharge  cane, straight  (Per record)   Patient choice form signed by patient/caregiver N/A   Anticipated Discharge Disposition Home   Can the patient answer the patient profile reliably? Yes, cognitively intact   How does the patient rate their overall health at the present time? Fair   Describe the patient's ability to walk at the present time. Walks with the help of equipment   How often would a person be available to care for the patient? Whenever needed   Number of comorbid conditions (as recorded on the chart) Two   During the past month, has the patient often been bothered by feeling down, depressed or hopeless? No   During the past month, has the patient often been bothered by little interest or pleasure in doing things? No

## 2019-04-09 NOTE — PROGRESS NOTES
Patient was experiencing multiple episodes of urgency & frequency. Bladder scan performed- >2100mL. MD notified, new order to insert jaimes. Jaimes inserted, patient tolerated well- 2400mL collected.

## 2019-04-09 NOTE — PROGRESS NOTES
Chief Complaint / Reason for Consult:  GI bleeding  72 yo man with hx of GI bleeding had colonoscopy no signs of bleeding , found hemorrhoids. H/H was stable. Doing well no abdominal pain or rectal bleeding or melena.    ROS:  No chest pain or rectal bleeding or abdominal pain.    Patient Vitals for the past 24 hrs:   BP Temp Temp src Pulse Resp SpO2 Weight   04/09/19 1129 (!) 162/81 98.6 °F (37 °C) Oral 96 18 (!) 94 % --   04/09/19 0733 (!) 161/91 97.6 °F (36.4 °C) Oral 96 18 (!) 94 % --   04/09/19 0440 (!) 144/70 98.7 °F (37.1 °C) Oral 88 18 (!) 93 % 124.6 kg (274 lb 11.1 oz)   04/09/19 0004 (!) 141/99 99.5 °F (37.5 °C) Oral 102 18 95 % --   04/08/19 1955 (!) 117/56 98.6 °F (37 °C) Oral 104 18 (!) 94 % --   04/08/19 1642 (!) 152/66 97.3 °F (36.3 °C) -- 109 18 (!) 94 % --   04/08/19 1604 136/86 -- -- 104 20 95 % --   04/08/19 1549 132/89 -- -- 103 20 95 % --   04/08/19 1536 126/81 -- -- -- -- -- --   04/08/19 1535 -- 97.9 °F (36.6 °C) -- 101 18 95 % --       Physical Exam:  Gen - Well developed, well nourished, no apparent distress  HEENT - Anicteric  CV - S1, S2, no murmurs/rubs  Lungs - CTA-B, normal excursion  Abd - Soft, NT, ND, normal BS's, no HSM.  Ext - No c/c/e  Neuro - No asterixis    Labs:  Recent Labs   Lab 04/09/19  0536   HGB 11.8*   HCT 35.8*     No results for input(s): GLUCOSE, CALCIUM, PROT, NA, K, CO2, CL, BUN, CREATININE, ALKPHOS, ALT, AST, BILITOT in the last 24 hours.    Invalid input(s):  ALBUMIN  No results for input(s): PT, INR, APTT in the last 24 hours.      Assessment:  This patient is a 73 y.o. male with:   1. Lower GI bleeding: colonoscopy, hemorrhoids no signs of bleeding  2. Anemia: H/H stable    Recommendations:  1.  Will sign off follow up as out patient in 2 weeks.

## 2019-04-09 NOTE — PROGRESS NOTES
Progress Note    Admit Date: 4/5/2019   LOS: 4 days     SUBJECTIVE:     Follow-up For:  Rectal bleeding     Interval History    04/09/19: Had C-scope yesterday. Some ab pain. No hematochezia today   04/08/19: intermittent hematochezia. + Ab. Pain. No fever or chills.   04/07/19: continue to have hematochezia. Hg dropped, developed AFib with RVR  04/06/19: had several bloody stool last night. Hg mild drop. Had EGD yesterday.       Scheduled Meds:   furosemide  20 mg Intravenous BID     Continuous Infusions:    PRN Meds:sodium chloride, metoprolol, oxyCODONE-acetaminophen, sodium chloride 0.9%    Review of patient's allergies indicates:   Allergen Reactions    Bee sting [allergen ext-venom-honey bee]     Culver clement (solidago canadensis)     Kenalog [triamcinolone acetonide]     Lidocaine        Review of Systems    Constitutional: some fatigue   Eyes: no visual changes   ENT: no nasal congestion. Denies sore throat with odynophagia   Respiratory: No cough, SOB, exertional dyspnea or  hemoptysis   Cardiovascular: NO cp.  Gastrointestinal: see HPI. Ab. Pain   Hematologic/Lymphatic: see HPI   Musculoskeletal: chronic knee and ankle pain  Neurological: no seizures or tremors, gait or balance prblems  Skin: No rashes or lesions  Psych: Denies any anxiety, depression or insomnia      OBJECTIVE:     Vital Signs (Most Recent)  Temp: 98.7 °F (37.1 °C) (04/09/19 0440)  Pulse: 88 (04/09/19 0440)  Resp: 18 (04/09/19 0440)  BP: (!) 144/70 (04/09/19 0440)  SpO2: (!) 93 % (04/09/19 0440)    Vital Signs Range (Last 24H):  Temp:  [96.6 °F (35.9 °C)-99.5 °F (37.5 °C)]   Pulse:  []   Resp:  [18-20]   BP: (117-181)/(56-99)   SpO2:  [93 %-95 %]     I & O (Last 24H):    Intake/Output Summary (Last 24 hours) at 4/9/2019 0647  Last data filed at 4/9/2019 0500  Gross per 24 hour   Intake 290 ml   Output 3050 ml   Net -2760 ml     Physical Exam:    General: NAD, resting in bed.   Head: normocephalic, atraumatic   Eyes: conjunctivae  pink, anicteric sclera.   Throat: No erythema or post nasal discharge   Neck: supple, no LAD   Lungs: decreased BS at the bedside   Heart: S1, S2, tachy  Abdomen: obese, some generalized tenderness.   Extremities: no cyanosis or edema.   Skin: turgor normal, no erythema or rashes.   MS: trace pedal edema, viv knee and ankle/foot tenderness   Neuro: A&O x 3   Psy: calm     Laboratory:    CBC:   Recent Labs   Lab 04/05/19  1644  04/09/19  0536   WBC 7.80  --   --    RBC 4.87  --   --    HGB 13.4*   < > 11.8*   HCT 40.4   < > 35.8*     --   --    MCV 83  --   --    MCH 27.5  --   --    MCHC 33.2  --   --     < > = values in this interval not displayed.     CMP:   Recent Labs   Lab 04/06/19  0527 04/08/19  0623   GLU 95 107   CALCIUM 9.9 10.0   ALBUMIN 3.5  --    PROT 7.1  --     138   K 4.7 4.1   CO2 19* 22*   * 108   BUN 44* 18   CREATININE 1.7* 1.5*   ALKPHOS 83  --    ALT 26  --    AST 17  --    BILITOT 0.8  --      Coagulation:   Recent Labs   Lab 04/05/19  1035   LABPROT 11.1   INR 1.1   APTT 29.5     Cardiac markers:   Recent Labs   Lab 04/05/19  1035   TROPONINI 0.007     Microbiology Results (last 7 days)     ** No results found for the last 168 hours. **        Specimen (12h ago, onward)    None        No results for input(s): COLORU, CLARITYU, SPECGRAV, PHUR, PROTEINUA, GLUCOSEU, BILIRUBINCON, BLOODU, WBCU, RBCU, BACTERIA, MUCUS, NITRITE, LEUKOCYTESUR, UROBILINOGEN, HYALINECASTS in the last 168 hours.    Diagnostic Results:      ASSESSMENT/PLAN:       Active Hospital Problems     Diagnosis   POA    *Gastrointestinal hemorrhage [K92.2]  - most probably due to UGI bleeding   - seen by GI and underwent EGD- no acute finding  - on protonix drip  - check H&H q 6 h for   - Hg dropped to 10.7g  - continue to have hematochezia- pt needs C-scope vs. Tagged rbc scan if continue to have rectal bleeding   - discussed with Dr. Millan   - transfused 2 units prb  - s/p C-scope on 04/08- No acute  finding  - pt may need capsular endoscopy       Yes    Acute blood loss anemia [D62]  - continue to monitor       Yes    JOHNY (acute kidney injury) [N17.9]   Yes     - improving   - continue hydration  - on clears   Due to ATN  - hydration   - labs in the am        Hypotension [I95.9]  - resolved after getting IV fluids in the ED  - hold off on diuretics         Yes       Resolved Hospital Problems   No resolved problems to display.      DVT pro: SCDs     Gen joint pain/Osteoarthritis: No NSAIDs              - prn percocet     Afib with RVR: mostly due to acute GI bleeding   - Lopressor IV with parameters     Ab pain: generalized   - CT ab/pelvis         Umang Wharton M.D  Internal Medicine & Geriatric Medicine  Hematology & Oncology  Palliative Medicine    [address], [address]  [place] [address]  [phone] (Office)  [phone] (Fax)

## 2019-04-09 NOTE — PROGRESS NOTES
Received bedside report from HARSH Jaime. Patient AAOx4, resting quietly in bed, telemetry monitoring in place, no distress noted. Safety maintained, call light in reach.

## 2019-04-09 NOTE — NURSING
Report given to HARSH Ramirez. Patient using bed side commode. No complaints, no acute distress noted. 12 hour chart check completed.

## 2019-04-09 NOTE — PLAN OF CARE
Problem: Adult Inpatient Plan of Care  Goal: Plan of Care Review  Outcome: Ongoing (interventions implemented as appropriate)     04/09/19 1656   Plan of Care Review   Plan of Care Reviewed With patient       Problem: Adjustment to Illness (Gastrointestinal Bleeding)  Goal: Optimal Coping with Acute Illness    Intervention: Optimize Psychosocial Response to Unexpected Illness     04/09/19 1656   Promote Anxiety Reduction   Supportive Measures active listening utilized;positive reinforcement provided;verbalization of feelings encouraged

## 2019-04-10 LAB
AORTIC ROOT ANNULUS: 3.91 CM
AORTIC VALVE CUSP SEPERATION: 1.77 CM
AV INDEX (PROSTH): 0.8
AV MEAN GRADIENT: 5.71 MMHG
AV PEAK GRADIENT: 8.88 MMHG
AV VALVE AREA: 2.99 CM2
AV VELOCITY RATIO: 0.78
BSA FOR ECHO PROCEDURE: 2.48 M2
CV ECHO LV RWT: 0.61 CM
DOP CALC AO PEAK VEL: 1.49 M/S
DOP CALC AO VTI: 27.68 CM
DOP CALC LVOT AREA: 3.76 CM2
DOP CALC LVOT DIAMETER: 2.19 CM
DOP CALC LVOT PEAK VEL: 1.16 M/S
DOP CALC LVOT STROKE VOLUME: 82.87 CM3
DOP CALCLVOT PEAK VEL VTI: 22.01 CM
ECHO LV POSTERIOR WALL: 1.38 CM (ref 0.6–1.1)
FRACTIONAL SHORTENING: 38 % (ref 28–44)
INTERVENTRICULAR SEPTUM: 1.48 CM (ref 0.6–1.1)
IVRT: 0.06 MSEC
LA MAJOR: 7.07 CM
LA MINOR: 5.9 CM
LA WIDTH: 5.46 CM
LEFT ATRIUM SIZE: 4.82 CM
LEFT ATRIUM VOLUME INDEX: 60.6 ML/M2
LEFT ATRIUM VOLUME: 143.89 CM3
LEFT INTERNAL DIMENSION IN SYSTOLE: 2.81 CM (ref 2.1–4)
LEFT VENTRICLE DIASTOLIC VOLUME INDEX: 38.85 ML/M2
LEFT VENTRICLE DIASTOLIC VOLUME: 92.3 ML
LEFT VENTRICLE MASS INDEX: 108 G/M2
LEFT VENTRICLE SYSTOLIC VOLUME INDEX: 12.5 ML/M2
LEFT VENTRICLE SYSTOLIC VOLUME: 29.7 ML
LEFT VENTRICULAR INTERNAL DIMENSION IN DIASTOLE: 4.5 CM (ref 3.5–6)
LEFT VENTRICULAR MASS: 256.49 G
MV PEAK E VEL: 0.98 M/S
PISA TR MAX VEL: 2.59 M/S
PV PEAK VELOCITY: 0.89 CM/S
RA MAJOR: 6.2 CM
RA PRESSURE: 3 MMHG
RA WIDTH: 4.28 CM
RIGHT VENTRICULAR END-DIASTOLIC DIMENSION: 3.79 CM
RV TISSUE DOPPLER FREE WALL SYSTOLIC VELOCITY 1 (APICAL 4 CHAMBER VIEW): 8.49 M/S
SINUS: 3.71 CM
STJ: 3.33 CM
TR MAX PG: 26.83 MMHG
TRICUSPID ANNULAR PLANE SYSTOLIC EXCURSION: 1.77 CM
TV REST PULMONARY ARTERY PRESSURE: 30 MMHG

## 2019-04-10 PROCEDURE — 94761 N-INVAS EAR/PLS OXIMETRY MLT: CPT

## 2019-04-10 PROCEDURE — 94640 AIRWAY INHALATION TREATMENT: CPT

## 2019-04-10 PROCEDURE — 25000242 PHARM REV CODE 250 ALT 637 W/ HCPCS: Performed by: INTERNAL MEDICINE

## 2019-04-10 PROCEDURE — 25000003 PHARM REV CODE 250: Performed by: INTERNAL MEDICINE

## 2019-04-10 PROCEDURE — 21400001 HC TELEMETRY ROOM

## 2019-04-10 PROCEDURE — 99223 1ST HOSP IP/OBS HIGH 75: CPT | Mod: ,,, | Performed by: INTERNAL MEDICINE

## 2019-04-10 PROCEDURE — 99223 PR INITIAL HOSPITAL CARE,LEVL III: ICD-10-PCS | Mod: ,,, | Performed by: INTERNAL MEDICINE

## 2019-04-10 PROCEDURE — 99900035 HC TECH TIME PER 15 MIN (STAT)

## 2019-04-10 PROCEDURE — 63600175 PHARM REV CODE 636 W HCPCS: Performed by: INTERNAL MEDICINE

## 2019-04-10 RX ORDER — IPRATROPIUM BROMIDE 0.5 MG/2.5ML
0.5 SOLUTION RESPIRATORY (INHALATION) EVERY 6 HOURS
Status: DISCONTINUED | OUTPATIENT
Start: 2019-04-10 | End: 2019-04-11 | Stop reason: HOSPADM

## 2019-04-10 RX ORDER — ALLOPURINOL 100 MG/1
100 TABLET ORAL DAILY
Status: DISCONTINUED | OUTPATIENT
Start: 2019-04-10 | End: 2019-04-11 | Stop reason: HOSPADM

## 2019-04-10 RX ORDER — AMLODIPINE BESYLATE 5 MG/1
10 TABLET ORAL DAILY
Status: DISCONTINUED | OUTPATIENT
Start: 2019-04-10 | End: 2019-04-10

## 2019-04-10 RX ORDER — PRAVASTATIN SODIUM 40 MG/1
40 TABLET ORAL DAILY
Status: DISCONTINUED | OUTPATIENT
Start: 2019-04-10 | End: 2019-04-11 | Stop reason: HOSPADM

## 2019-04-10 RX ORDER — FLUTICASONE FUROATE AND VILANTEROL 100; 25 UG/1; UG/1
1 POWDER RESPIRATORY (INHALATION) DAILY
Status: DISCONTINUED | OUTPATIENT
Start: 2019-04-10 | End: 2019-04-11 | Stop reason: HOSPADM

## 2019-04-10 RX ORDER — TAMSULOSIN HYDROCHLORIDE 0.4 MG/1
0.8 CAPSULE ORAL DAILY
Status: DISCONTINUED | OUTPATIENT
Start: 2019-04-10 | End: 2019-04-11 | Stop reason: HOSPADM

## 2019-04-10 RX ORDER — LOSARTAN POTASSIUM 25 MG/1
100 TABLET ORAL DAILY
Status: DISCONTINUED | OUTPATIENT
Start: 2019-04-10 | End: 2019-04-11 | Stop reason: HOSPADM

## 2019-04-10 RX ORDER — METOPROLOL TARTRATE 50 MG/1
50 TABLET ORAL 2 TIMES DAILY
Status: DISCONTINUED | OUTPATIENT
Start: 2019-04-10 | End: 2019-04-10

## 2019-04-10 RX ORDER — METOPROLOL TARTRATE 50 MG/1
100 TABLET ORAL 2 TIMES DAILY
Status: DISCONTINUED | OUTPATIENT
Start: 2019-04-10 | End: 2019-04-11 | Stop reason: HOSPADM

## 2019-04-10 RX ADMIN — ALLOPURINOL 100 MG: 100 TABLET ORAL at 11:04

## 2019-04-10 RX ADMIN — METOPROLOL TARTRATE 100 MG: 50 TABLET ORAL at 08:04

## 2019-04-10 RX ADMIN — PRAVASTATIN SODIUM 40 MG: 40 TABLET ORAL at 11:04

## 2019-04-10 RX ADMIN — IPRATROPIUM BROMIDE 0.5 MG: 0.5 SOLUTION RESPIRATORY (INHALATION) at 07:04

## 2019-04-10 RX ADMIN — FUROSEMIDE 20 MG: 10 INJECTION, SOLUTION INTRAMUSCULAR; INTRAVENOUS at 09:04

## 2019-04-10 RX ADMIN — FLUTICASONE FUROATE AND VILANTEROL TRIFENATATE 1 PUFF: 100; 25 POWDER RESPIRATORY (INHALATION) at 12:04

## 2019-04-10 RX ADMIN — TAMSULOSIN HYDROCHLORIDE 0.8 MG: 0.4 CAPSULE ORAL at 06:04

## 2019-04-10 RX ADMIN — IPRATROPIUM BROMIDE 0.5 MG: 0.5 SOLUTION RESPIRATORY (INHALATION) at 12:04

## 2019-04-10 RX ADMIN — METOPROLOL TARTRATE 100 MG: 50 TABLET ORAL at 11:04

## 2019-04-10 RX ADMIN — LOSARTAN POTASSIUM 100 MG: 25 TABLET, FILM COATED ORAL at 11:04

## 2019-04-10 NOTE — NURSING
Notified  that patient's HR is on 180's. Patient awake, alert, oriented x4 without c/o discomfort. New orders received to consult to cardiology. Notified Dr. Gallo.

## 2019-04-10 NOTE — PHYSICIAN QUERY
Diabetes: Inspecting Your Feet    Diabetes increases your chances of developing foot problems. So inspect your feet every day. This helps you find small skin irritations before they become serious ulcers or infections. If you have trouble seeing the bottoms of your feet, use a mirror or ask a family member or friend to help.  How to check your feet  Below are tips to help you look for foot problems. Try to check your feet at the same time each day, such as when you get out of bed in the morning:  · Check the top of each foot. The tops of toes, back of the heel, and outer edge of the foot can get a lot of rubbing from poor-fitting shoes.  · Check the bottom of each foot. Daily wear and tear often leads to problems at pressure spots.  · Check the toes and nails. Fungal infections often occur between toes. Toenail problems can also be a sign of fungal infections or lead to breaks in the skin.  · Check your shoes, too. Loose objects inside a shoe can injure the foot. Use your hand to feel inside your shoes for things like lelo, loose stitching, or rough areas that could irritate your skin.  Warning signs  Look for any color changes in the foot. Redness with streaks can signal a severe infection, which needs immediate medical attention. Tell your healthcare provider right away if you have any of these problems:  · Swelling, sometimes with color changes, may be a sign of poor blood flow or infection. Symptoms include tenderness and an increase in the size of your foot.  · Warm or hot areas on your feet may be signs of infection. A foot that is cold may not be getting enough blood.  · Sensations such as burning, tingling, or pins and needles can be signs of a problem. Also check for areas that may be numb.  · Hot spots are caused by friction or pressure. Look for hot spots in areas that get a lot of rubbing. Hot spots can turn into blisters, calluses, or sores.  · Cracks and sores are caused by dry or irritated  "PT Name: John E Sandifer  MR #: [id number]     Physician Query Form - Documentation Clarification      Paola Nagy RN, CCDS  Desk # [phone]; alba # [phone] [email or web address]      This form is a permanent document in the medical record.     Query Date: April 10, 2019    By submitting this query, we are merely seeking further clarification of documentation. Please utilize your independent clinical judgment when addressing the question(s) below.    The Medical record reflects the following:    Supporting Clinical Findings Location in Medical Record   Impression:      - Normal esophagus.  - Gastritis.  - Erythematous duodenopathy.  - No specimens collected.    Rec's:  - Use Protonix (pantoprazole) 40 mg PO daily.   EGD Report                                                                                Doctor, Please specify diagnosis or diagnoses associated with above clinical findings.       Please further specify "gastritis".     Provider Use Only      (  ) Chronic without bleeding    (  ) Acute without bleeding    (  ) Acute erosive without bleeding    (  ) Chronic erosive without bleeding    (  ) Superficial chronic without bleeding    (  ) Other gastritis - specify type and bleeding status:                  Type: _____________                  Specify: (  )With bleeding;  (  )Without bleeding    ( x ) Other diagnosis: _____Please direct this to GI__________    (  ) Unspecified gastritis                                                                                                           [  ]  Clinically Undetermined               " skin. They are a sign that the skin is breaking down, which can lead to infection.  · Toenail problems to watch for include nails growing into the skin (ingrown toenail) and causing redness or pain. Thick, yellow, or discolored nails can signal a fungal infection.  · Drainage and odor can develop from untreated sores and ulcers. Call your healthcare provider right away if you notice white or yellow drainage, bleeding, or unpleasant odor.   Date Last Reviewed: 6/1/2016 © 2000-2016 [employer]. [address] [place] [address]. All rights reserved. This information is not intended as a substitute for professional medical care. Always follow your healthcare professional's instructions.      Your Diabetes Foot Care Program    Every day you depend on your feet to keep you moving. But when you have diabetes, your feet need special care. Even a small foot problem can become very serious. So dont take your feet for granted. By working with your diabetes healthcare team, you can learn how to protect your feet and keep them healthy.  Evaluating your feet  An evaluation helps your healthcare provider check the condition of your feet. The evaluation includes a review of your diabetes history and overall health. It may also include a foot exam, X-rays, or other tests. These can help show problems beneath the skin that you cant see or feel.  Medical history  You will be asked about your overall health and any history of foot problems. Youll also discuss your diabetes history, such as whether your blood sugar level has changed over time. It also includes questions about sensations of pain, tingling, pins and needles, or numbness. Your healthcare provider will also want to know if you have high blood pressure and heart disease, or if you smoke. Be sure to mention any medicines (including over-the-counter), supplements, or herbal remedies you take.  Foot exam  A foot exam checks the condition of different  parts of your foot. First, your skin and nails are examined for any signs of infection. Blood flow is checked by feeling for the pulses in each foot. You may also have tests to study the nerves in the foot. These include using a small filament (wire) to see how sensitive your feet are. In certain cases, you will be asked to walk a short distance to check for bone, joint, and muscle problems.  Diagnostic tests  If needed, your healthcare provider will suggest certain tests to learn more about your feet. These include:  · Doppler tests to measure blood flow in the feet and lower leg.  · X-rays, which can show bone or joint problems.  · Other imaging tests, such as an MRI (magnetic resonance imaging), bone scan, and CT (computed tomography) scan. These can help show bone infections.  · Other tests, such as vascular tests, which study the blood flow in your feet and legs. You may also have nerve studies to learn how sensitive your feet are.  Creating a foot care program  Based on the evaluation, your healthcare provider will create a foot care program for you. Your program may be as simple as starting a daily self-care routine and changing the types of shoes your wear. It may also involve treating minor foot problems, such as a corn or blister. In some cases, surgery will be needed to treat an infection or mechanical problems, such as hammer toes.  Preventing problems  When you have diabetes, its easier to prevent problems than to treat them later on. So see your healthcare team for regular checkups and foot care. Your healthcare team can also help you learn more about caring for your feet at home. For example, you may be told to avoid walking barefoot. Or you may be told that special footwear is needed to protect your feet.  Have regular checkups  Foot problems can develop quickly. So be sure to follow your healthcare teams schedule for regular checkups. During office visits, take off your shoes and socks as soon as  you get in the exam room. Ask your healthcare provider to examine your feet for problems. This will make it easier to find and treat small skin irritations before they get worse. Regular checkups can also help keep track of the blood flow and feeling in your feet. If you have neuropathy (lack of feeling in your feet), you will need to have checkups more often.  Learn about self-care  The more you know about diabetes and your feet, the easier it will be to prevent problems. Members of your healthcare team can teach you how to inspect your feet and teach you to look for warning signs. They can also give you other foot care tips. During office visits, be sure to ask any questions you have.  Date Last Reviewed: 7/1/2016 © 2000-2016 [employer]. [address] [place] [address], [place] [address]. All rights reserved. This information is not intended as a substitute for professional medical care. Always follow your healthcare professional's instructions.        Long-Term Complications of Diabetes    Diabetes can cause health problems over time. These are called complications. They are more likely to happen if your blood sugar is often too high. Over time, high blood sugar can damage blood vessels in your body. It is important to keep your blood sugar in your target range. This can help prevent or delay complications from diabetes.  Possible complications  Complications of diabetes include:  · Eye problems, including damage to the blood vessels in the eyes (retinopathy), pressure in the eye (glaucoma), and clouding of the eyes lens (a cataract). Eye problems can eventually lead to irreversible blindness.   · Tooth and gum problems (periodontal disease), causing loss of teeth and bone  · Blood vessel (vascular) disease leading to circulation problems, heart attack or stroke, or a need for amputation of a limb   · Problems with sexual function leading to erectile dysfunction in men and sexual discomfort in  women   · Kidney disease (nephropathy) can eventually lead to kidney failure, which may require dialysis or kidney transplant   · Nerve problems (neuropathy), causing pain or loss of feeling in your feet and other parts of your body, potentially leading to an amputation of a limb   · High blood pressure (hypertension), putting strain on your heart and blood vessels  · Serious infections, possibly leading to loss of toes, feet, or limbs  How to avoid complications  The serious consequences of these complications may be avoidable for most people with diabetes by managing your blood glucose, blood pressure, and cholesterol levels. This can help you feel better and stay healthy. You can manage diabetes by tracking your blood sugar. You can also eat healthy and exercise to avoid gaining weight. And you should take medicine if directed by your healthcare provider.  Date Last Reviewed: 5/1/2016 © 2000-2016 [employer]. [address] [place] [address], [place] [address]. All rights reserved. This information is not intended as a substitute for professional medical care. Always follow your healthcare professional's instructions.

## 2019-04-10 NOTE — PROGRESS NOTES
Progress Note    Admit Date: 4/5/2019   LOS: 5 days     SUBJECTIVE:     Follow-up For:  Rectal bleeding     Interval History    04/10/19: had CT ab done. No fever.   04/09/19: Had C-scope yesterday. Some ab pain. No hematochezia today   04/08/19: intermittent hematochezia. + Ab. Pain. No fever or chills.   04/07/19: continue to have hematochezia. Hg dropped, developed AFib with RVR  04/06/19: had several bloody stool last night. Hg mild drop. Had EGD yesterday.       Scheduled Meds:   furosemide  20 mg Intravenous BID     Continuous Infusions:    PRN Meds:sodium chloride, metoprolol, oxyCODONE-acetaminophen, sodium chloride 0.9%    Review of patient's allergies indicates:   Allergen Reactions    Bee sting [allergen ext-venom-honey bee]     Culver clement (solidago canadensis)     Kenalog [triamcinolone acetonide]     Lidocaine        Review of Systems    Constitutional: some fatigue   Eyes: no visual changes   ENT: no nasal congestion. Denies sore throat with odynophagia   Respiratory: No cough, SOB, exertional dyspnea or  hemoptysis   Cardiovascular: NO cp.  Gastrointestinal: see HPI. Ab. Pain   Hematologic/Lymphatic: see HPI   Musculoskeletal: chronic knee and ankle pain  Neurological: no seizures or tremors, gait or balance prblems  Skin: No rashes or lesions  Psych: Denies any anxiety, depression or insomnia      OBJECTIVE:     Vital Signs (Most Recent)  Temp: 98.2 °F (36.8 °C) (04/10/19 0350)  Pulse: 94 (04/10/19 0350)  Resp: 18 (04/10/19 0350)  BP: (!) 139/92 (04/10/19 0350)  SpO2: (!) 94 % (04/10/19 0350)    Vital Signs Range (Last 24H):  Temp:  [97.6 °F (36.4 °C)-98.6 °F (37 °C)]   Pulse:  []   Resp:  [18-20]   BP: (138-162)/(74-92)   SpO2:  [94 %-96 %]     I & O (Last 24H):    Intake/Output Summary (Last 24 hours) at 4/10/2019 0703  Last data filed at 4/10/2019 0600  Gross per 24 hour   Intake 360 ml   Output 2900 ml   Net -2540 ml     Physical Exam:    General: NAD, resting in bed.   Head:  normocephalic, atraumatic   Eyes: conjunctivae pink, anicteric sclera.   Throat: No erythema or post nasal discharge   Neck: supple, no LAD   Lungs: decreased BS at the bedside   Heart: S1, S2, tachy  Abdomen: obese, some generalized tenderness.   Extremities: no cyanosis or edema.   : Fatima   Skin: turgor normal, no erythema or rashes.   MS: trace pedal edema, viv knee and ankle/foot tenderness   Neuro: A&O x 3   Psy: calm     Laboratory:    CBC:   Recent Labs   Lab 04/05/19  1644  04/09/19  0536   WBC 7.80  --   --    RBC 4.87  --   --    HGB 13.4*   < > 11.8*   HCT 40.4   < > 35.8*     --   --    MCV 83  --   --    MCH 27.5  --   --    MCHC 33.2  --   --     < > = values in this interval not displayed.     CMP:   Recent Labs   Lab 04/06/19  0527 04/08/19  0623   GLU 95 107   CALCIUM 9.9 10.0   ALBUMIN 3.5  --    PROT 7.1  --     138   K 4.7 4.1   CO2 19* 22*   * 108   BUN 44* 18   CREATININE 1.7* 1.5*   ALKPHOS 83  --    ALT 26  --    AST 17  --    BILITOT 0.8  --      Coagulation:   Recent Labs   Lab 04/05/19  1035   LABPROT 11.1   INR 1.1   APTT 29.5     Cardiac markers:   Recent Labs   Lab 04/05/19  1035   TROPONINI 0.007     Microbiology Results (last 7 days)     ** No results found for the last 168 hours. **        Specimen (12h ago, onward)    None        No results for input(s): COLORU, CLARITYU, SPECGRAV, PHUR, PROTEINUA, GLUCOSEU, BILIRUBINCON, BLOODU, WBCU, RBCU, BACTERIA, MUCUS, NITRITE, LEUKOCYTESUR, UROBILINOGEN, HYALINECASTS in the last 168 hours.    Diagnostic Results:      ASSESSMENT/PLAN:       Active Hospital Problems     Diagnosis   POA    *Gastrointestinal hemorrhage [K92.2]  - most probably due to UGI bleeding   - seen by GI and underwent EGD- no acute finding  - on protonix drip  - check H&H q 6 h for   - Hg dropped to 10.7g  - continue to have hematochezia- pt needs C-scope vs. Tagged rbc scan if continue to have rectal bleeding   - discussed with Dr. Millan   -  transfused 2 units prb  - s/p C-scope on 04/08- No acute finding  - pt may need capsular endoscopy- can be done out pt        Yes    Acute blood loss anemia [D62]  - continue to monitor       Yes    JOHNY (acute kidney injury) [N17.9]   Yes     - improving   - continue hydration  - on clears   Due to ATN  - hydration   - labs in the am        Hypotension [I95.9]  - resolved after getting IV fluids in the ED  - hold off on diuretics         Yes       Resolved Hospital Problems   No resolved problems to display.      DVT pro: SCDs     Gen joint pain/Osteoarthritis: No NSAIDs              - prn percocet     Afib with RVR: mostly due to acute GI bleeding   - Lopressor IV with parameters     Ab pain: generalized   - CT ab/pelvis - no acute finding    Urinary retention: Flomax   - ok to discontinue Fatima- discussed with HARSH Wharton M.D  Internal Medicine & Geriatric Medicine  Hematology & Oncology  Palliative Medicine    [address] [facility], [address]  [place], [place] [id number] [phone] (Office)  [phone] (Fax)

## 2019-04-10 NOTE — PLAN OF CARE
Problem: Adult Inpatient Plan of Care  Goal: Plan of Care Review  Outcome: Ongoing (interventions implemented as appropriate)  Bipap/CPAP and oxygen discussed with aldo Lynch, RRT

## 2019-04-10 NOTE — CONSULTS
[facility]  Cardiology  Consult Note    Patient Name: John E Sandifer  MRN: [id number]  Admission Date: 4/5/2019  Hospital Length of Stay: 5 days  Code Status: Full Code   Attending Provider: Umang Wharton MD   Consulting Provider: Chico Gallo MD  Primary Care Physician: Umang Wharton MD  Principal Problem:Chronic a-fib    Patient information was obtained from patient, past medical records and ER records.     Inpatient consult to Cardiology  Consult performed by: Chico Gallo MD  Consult ordered by: Umang Wharton MD  Reason for consult: Tachycardia and AFib        Subjective:     Chief Complaint:  Bloody stool     HPI:   73 y.o. male with afib, HTN, BPH presenting to the ED with c/o hypotension, weakness, and blood in stool x 2 days.  Presents for evaluation of brbpr, 7 episodes in last 2 days. Denies black/tarry stools, notes prior ulcer, prior polyps, states hard colectomy done in the past. Denies light headedness/dizziness. He denies abd pain.   Per pts pmd pt takes diclofenac and will not stop despite having been told by his PMD to stop.    Patient was previously seen by doctor Kurt.  He was last seen in 2013 has not followed up since that time.  He has a known history of atrial fibrillation and was off of oral anticoagulation for over 5 years due to GI bleed history.  He again presented with significant lower bleeding and is status post endoscopy.  He is out of shape in when he gets up his heart rate spikes as high as the 180s.  He has not had a cardiac evaluation in several years.  He denies any PND, orthopnea or lower extremity edema.  He has not experiencing dizziness, presyncope or syncope.  He gets short winded with minimal activity.      Past Medical History:   Diagnosis Date    Abnormal bilirubin test     Arthritis     Atrial fibrillation     Bilateral knee effusions     Bilateral knee pain     Chronic bronchitis     Chronic sinusitis     GERD  (gastroesophageal reflux disease)     HTN (hypertension)     HTN, goal below 130/80 6/1/2016    Hyperlipidemia LDL goal < 130     Maxillary sinus polyp     Morbid obesity with BMI of 40.0-44.9, adult     NELLY (obstructive sleep apnea)        Past Surgical History:   Procedure Laterality Date    APPENDECTOMY      Age 55    COLON SURGERY      collectomy    COLONOSCOPY N/A 4/8/2019    Performed [facility] at [facility]O    EGD (ESOPHAGOGASTRODUODENOSCOPY) N/A 4/5/2019    Performed by Bertram Curry MD at [facility] ENDO    NOSE SURGERY      RECONSTRUCTION-NASAL septum  10/4/2016    Performed by [facility]yes [facility] at [facility] OR    SINUS SURGERY FUNCTIONAL ENDOSCOPIC WITH NAVIGATION N/A 10/4/2016    [facility]formed by [facility] at [facility] OR    SPHENOIDECTOMY- FRONTAL-  MAXILLARY- ETHMOID Bilateral 10/4/2016    Performed by [facility] at [facility] OR       Review of patient's allergies indicates:   Allergen Reactions    Bee sting [allergen ext-venom-honey bee]     Culver clement (solidago canadensis)     Kenalog [triamcinolone acetonide]     Lidocaine        No current facility-administered medications on file prior to encounter.      Current Outpatient Medications on File Prior to Encounter   Medication Sig    ADVAIR DISKUS 250-50 mcg/dose diskus inhaler INHALE 1 PUFF BY MOUTH INTO THE LUNGS TWICE DAILY    albuterol (PROAIR HFA) 90 mcg/actuation inhaler INHALE 2 PUFFS INTO THE LUNGS EVERY 6 (SIX) HOURS AS NEEDED FOR WHEEZING.    allopurinol (ZYLOPRIM) 100 MG tablet TAKE 1 TABLET BY MOUTH EVERY DAY    amLODIPine (NORVASC) 10 MG tablet TAKE 1 TABLET BY MOUTH EVERY DAY    losartan (COZAAR) 100 MG tablet TAKE 1 TABLET BY MOUTH ONCE DAILY    metoprolol tartrate (LOPRESSOR) 50 MG tablet Take 1 tablet (50 mg total) by mouth 2 (two) times daily.    omeprazole (PRILOSEC) 20 MG capsule TAKE 1 CAPSULE(20 MG) BY MOUTH TWICE DAILY    tamsulosin (FLOMAX) 0.4 mg Cap Take 1 capsule (0.4 mg total) by mouth once  daily.    albuterol (PROVENTIL) 2.5 mg /3 mL (0.083 %) nebulizer solution Take 2.5 mg by nebulization every 6 (six) hours as needed for Wheezing. Rescue    diclofenac (CATAFLAM) 50 MG tablet TAKE 1 TABLET(50 MG) BY MOUTH DAILY AS NEEDED    GLUCOSAMINE/D3/BOSWELLIA TOO (OSTEO BI-FLEX, 5-LOXIN, ORAL) Take 2 tablets by mouth every evening.    meloxicam (MOBIC) 7.5 MG tablet Take 1 tablet (7.5 mg total) by mouth once daily.    miscellaneous medical supply Kit 1 Units by Misc.(Non-Drug; Combo Route) route nightly.    nebulizer and compressor Natalya 1 Units by Misc.(Non-Drug; Combo Route) route once daily.    pravastatin (PRAVACHOL) 40 MG tablet TAKE 1 TABLET(40 MG) BY MOUTH EVERY DAY    spironolactone (ALDACTONE) 25 MG tablet TAKE 1 TABLET (25 MG TOTAL) BY MOUTH 2 (TWO) TIMES DAILY.     Family History     Problem Relation (Age of Onset)    Heart failure Mother, Brother    Hypertension Son    Rheum arthritis Brother    Stroke Father        Tobacco Use    Smoking status: Former Smoker     Packs/day: 1.00     Years: 20.00     Pack years: 20.00     Types: Cigarettes     Last attempt to quit: 9/4/2003     Years since quitting: 15.6    Smokeless tobacco: Never Used   Substance and Sexual Activity    Alcohol use: Yes     Comment: Occasionally    Drug use: No    Sexual activity: Yes     Partners: Female     Review of Systems   Constitution: Positive for malaise/fatigue.   HENT: Negative.    Eyes: Negative.    Cardiovascular: Positive for dyspnea on exertion. Negative for chest pain, irregular heartbeat, leg swelling, near-syncope, orthopnea, palpitations, paroxysmal nocturnal dyspnea and syncope.   Respiratory: Negative for shortness of breath.    Skin: Negative.    Musculoskeletal: Negative.    Gastrointestinal: Negative for abdominal pain, constipation and diarrhea.   Genitourinary: Negative for dysuria.   Neurological: Negative for dizziness.   Psychiatric/Behavioral: Negative.      Objective:     Vital Signs  (Most Recent):  Temp: 97.9 °F (36.6 °C) (04/10/19 1124)  Pulse: 108 (04/10/19 1124)  Resp: 18 (04/10/19 1124)  BP: 132/63 (04/10/19 1124)  SpO2: (!) 93 % (04/10/19 1124) Vital Signs (24h Range):  Temp:  [97.5 °F (36.4 °C)-98.5 °F (36.9 °C)] 97.9 °F (36.6 °C)  Pulse:  [] 108  Resp:  [18-20] 18  SpO2:  [93 %-96 %] 93 %  BP: (132-147)/(63-92) 132/63     Weight: 126.5 kg (278 lb 14.1 oz)  Body mass index is 41.18 kg/m².    SpO2: (!) 93 %  O2 Device (Oxygen Therapy): room air      Intake/Output Summary (Last 24 hours) at 4/10/2019 1244  Last data filed at 4/10/2019 0800  Gross per 24 hour   Intake 358 ml   Output 2575 ml   Net -2217 ml       Lines/Drains/Airways     Airway                 Airway - Non-Surgical 04/08/19 1511 Nasal Cannula 1 day          Peripheral Intravenous Line                 Peripheral IV - Single Lumen 04/09/19 1130 Anterior;Right Forearm 1 day                Physical Exam   Constitutional: He is oriented to person, place, and time. He appears well-developed and well-nourished. No distress.   HENT:   Head: Normocephalic and atraumatic.   Eyes: Pupils are equal, round, and reactive to light. Conjunctivae and EOM are normal.   Neck: Normal range of motion. Neck supple. No thyromegaly present.   Cardiovascular: Normal heart sounds. An irregularly irregular rhythm present. Tachycardia present.   No murmur heard.  Pulmonary/Chest: Effort normal and breath sounds normal. No respiratory distress. He has no wheezes. He has no rales. He exhibits no tenderness.   Abdominal: Soft. Bowel sounds are normal.   Musculoskeletal: He exhibits no edema.   Neurological: He is alert and oriented to person, place, and time.   Skin: Skin is warm and dry.   Psychiatric: He has a normal mood and affect. His behavior is normal.       Significant Labs:   CMP No results for input(s): NA, K, CL, CO2, GLU, BUN, CREATININE, CALCIUM, PROT, ALBUMIN, BILITOT, ALKPHOS, AST, ALT, ANIONGAP, ESTGFRAFRICA, EGFRNONAA in the last 48  hours., CBC   Recent Labs   Lab 04/08/19 2024 04/09/19  0126 04/09/19  0536   HGB 11.7* 11.8* 11.8*   HCT 35.4* 35.4* 35.8*   , INR No results for input(s): INR, PROTIME in the last 48 hours., Lipid Panel No results for input(s): CHOL, HDL, LDLCALC, TRIG, CHOLHDL in the last 48 hours. and Troponin No results for input(s): TROPONINI in the last 48 hours.    Significant Imaging: EKG: AFib with RVR    Assessment and Plan:     * Chronic a-fib  Has significant increase in heart rate with underlying issues  Increase metoprolol as tolerated by blood pressure  No indication for AAD with chronicity unlikely will improve as he recovers from acute presentation    Acute blood loss anemia  Better post transfusion and endoscopy    HTN (hypertension)  Stable    HLD (hyperlipidemia)  On statin    NELLY (obstructive sleep apnea)  Compliance with CPAP        VTE Risk Mitigation (From admission, onward)        Ordered     IP VTE HIGH RISK PATIENT  Once      04/05/19 1404     Place sequential compression device  Until discontinued      04/05/19 1404          Thank you for your consult. I will follow-up with patient. Please contact us if you have any additional questions.    Chico Gallo MD  Cardiology   [facility]

## 2019-04-10 NOTE — SUBJECTIVE & OBJECTIVE
Past Medical History:   Diagnosis Date    Abnormal bilirubin test     Arthritis     Atrial fibrillation     Bilateral knee effusions     Bilateral knee pain     Chronic bronchitis     Chronic sinusitis     GERD (gastroesophageal reflux disease)     HTN (hypertension)     HTN, goal below 130/80 6/1/2016    Hyperlipidemia LDL goal < 130     Maxillary sinus polyp     Morbid obesity with BMI of 40.0-44.9, adult     NELLY (obstructive sleep apnea)        Past Surgical History:   Procedure Laterality Date    APPENDECTOMY      Age 55    COLON SURGERY      collectomy    COLONOSCOPY N/A 4/8/2019    [facility]    EGD (ESOPHAGOGASTRODUODENOSCOPY) N/A 4/5/2019    [facility]ram [facility] ENDO    NOSE SURGERY      RECONSTRUCTION-NASAL septum  10/4/2016    [facility] OR    SINUS SURGERY FUNCTIONAL ENDOSCOPIC WITH NAVIGATION N/A 10/4/2016    [facility] OR    SPHENOIDECTOMY- FRONTAL-  MAXILLARY- ETHMOID Bilateral 10/4/2016    [facility] OR       Review of patient's allergies indicates:   Allergen Reactions    Bee sting [allergen ext-venom-honey bee]     Culver clement (solidago canadensis)     Kenalog [triamcinolone acetonide]     Lidocaine        No current facility-administered medications on file prior to encounter.      Current Outpatient Medications on File Prior to Encounter   Medication Sig    ADVAIR DISKUS 250-50 mcg/dose diskus inhaler INHALE 1 PUFF BY MOUTH INTO THE LUNGS TWICE DAILY    albuterol (PROAIR HFA) 90 mcg/actuation inhaler INHALE 2 PUFFS INTO THE LUNGS EVERY 6 (SIX) HOURS AS NEEDED FOR WHEEZING.    allopurinol (ZYLOPRIM) 100 MG tablet TAKE 1 TABLET BY MOUTH EVERY DAY    amLODIPine (NORVASC) 10 MG tablet TAKE 1 TABLET BY MOUTH EVERY DAY    losartan (COZAAR) 100 MG tablet TAKE 1 TABLET BY MOUTH ONCE DAILY    metoprolol tartrate (LOPRESSOR) 50 MG tablet Take  1 tablet (50 mg total) by mouth 2 (two) times daily.    omeprazole (PRILOSEC) 20 MG capsule TAKE 1 CAPSULE(20 MG) BY MOUTH TWICE DAILY    tamsulosin (FLOMAX) 0.4 mg Cap Take 1 capsule (0.4 mg total) by mouth once daily.    albuterol (PROVENTIL) 2.5 mg /3 mL (0.083 %) nebulizer solution Take 2.5 mg by nebulization every 6 (six) hours as needed for Wheezing. Rescue    diclofenac (CATAFLAM) 50 MG tablet TAKE 1 TABLET(50 MG) BY MOUTH DAILY AS NEEDED    GLUCOSAMINE/D3/BOSWELLIA TOO (OSTEO BI-FLEX, 5-LOXIN, ORAL) Take 2 tablets by mouth every evening.    meloxicam (MOBIC) 7.5 MG tablet Take 1 tablet (7.5 mg total) by mouth once daily.    miscellaneous medical supply Kit 1 Units by Misc.(Non-Drug; Combo Route) route nightly.    nebulizer and compressor Natalya 1 Units by Misc.(Non-Drug; Combo Route) route once daily.    pravastatin (PRAVACHOL) 40 MG tablet TAKE 1 TABLET(40 MG) BY MOUTH EVERY DAY    spironolactone (ALDACTONE) 25 MG tablet TAKE 1 TABLET (25 MG TOTAL) BY MOUTH 2 (TWO) TIMES DAILY.     Family History     Problem Relation (Age of Onset)    Heart failure Mother, Brother    Hypertension Son    Rheum arthritis Brother    Stroke Father        Tobacco Use    Smoking status: Former Smoker     Packs/day: 1.00     Years: 20.00     Pack years: 20.00     Types: Cigarettes     Last attempt to quit: 9/4/2003     Years since quitting: 15.6    Smokeless tobacco: Never Used   Substance and Sexual Activity    Alcohol use: Yes     Comment: Occasionally    Drug use: No    Sexual activity: Yes     Partners: Female     Review of Systems   Constitution: Positive for malaise/fatigue.   HENT: Negative.    Eyes: Negative.    Cardiovascular: Positive for dyspnea on exertion. Negative for chest pain, irregular heartbeat, leg swelling, near-syncope, orthopnea, palpitations, paroxysmal nocturnal dyspnea and syncope.   Respiratory: Negative for shortness of breath.    Skin: Negative.    Musculoskeletal: Negative.     Gastrointestinal: Negative for abdominal pain, constipation and diarrhea.   Genitourinary: Negative for dysuria.   Neurological: Negative for dizziness.   Psychiatric/Behavioral: Negative.      Objective:     Vital Signs (Most Recent):  Temp: 97.9 °F (36.6 °C) (04/10/19 1124)  Pulse: 108 (04/10/19 1124)  Resp: 18 (04/10/19 1124)  BP: 132/63 (04/10/19 1124)  SpO2: (!) 93 % (04/10/19 1124) Vital Signs (24h Range):  Temp:  [97.5 °F (36.4 °C)-98.5 °F (36.9 °C)] 97.9 °F (36.6 °C)  Pulse:  [] 108  Resp:  [18-20] 18  SpO2:  [93 %-96 %] 93 %  BP: (132-147)/(63-92) 132/63     Weight: 126.5 kg (278 lb 14.1 oz)  Body mass index is 41.18 kg/m².    SpO2: (!) 93 %  O2 Device (Oxygen Therapy): room air      Intake/Output Summary (Last 24 hours) at 4/10/2019 1244  Last data filed at 4/10/2019 0800  Gross per 24 hour   Intake 358 ml   Output 2575 ml   Net -2217 ml       Lines/Drains/Airways     Airway                 Airway - Non-Surgical 04/08/19 1511 Nasal Cannula 1 day          Peripheral Intravenous Line                 Peripheral IV - Single Lumen 04/09/19 1130 Anterior;Right Forearm 1 day                Physical Exam   Constitutional: He is oriented to person, place, and time. He appears well-developed and well-nourished. No distress.   HENT:   Head: Normocephalic and atraumatic.   Eyes: Pupils are equal, round, and reactive to light. Conjunctivae and EOM are normal.   Neck: Normal range of motion. Neck supple. No thyromegaly present.   Cardiovascular: Normal heart sounds. An irregularly irregular rhythm present. Tachycardia present.   No murmur heard.  Pulmonary/Chest: Effort normal and breath sounds normal. No respiratory distress. He has no wheezes. He has no rales. He exhibits no tenderness.   Abdominal: Soft. Bowel sounds are normal.   Musculoskeletal: He exhibits no edema.   Neurological: He is alert and oriented to person, place, and time.   Skin: Skin is warm and dry.   Psychiatric: He has a normal mood and  affect. His behavior is normal.       Significant Labs:   CMP No results for input(s): NA, K, CL, CO2, GLU, BUN, CREATININE, CALCIUM, PROT, ALBUMIN, BILITOT, ALKPHOS, AST, ALT, ANIONGAP, ESTGFRAFRICA, EGFRNONAA in the last 48 hours., CBC   Recent Labs   Lab 04/08/19 2024 04/09/19  0126 04/09/19  0536   HGB 11.7* 11.8* 11.8*   HCT 35.4* 35.4* 35.8*   , INR No results for input(s): INR, PROTIME in the last 48 hours., Lipid Panel No results for input(s): CHOL, HDL, LDLCALC, TRIG, CHOLHDL in the last 48 hours. and Troponin No results for input(s): TROPONINI in the last 48 hours.    Significant Imaging: EKG: AFib with RVR

## 2019-04-10 NOTE — HPI
73 y.o. male with afib, HTN, BPH presenting to the ED with c/o hypotension, weakness, and blood in stool x 2 days.  Presents for evaluation of brbpr, 7 episodes in last 2 days. Denies black/tarry stools, notes prior ulcer, prior polyps, states hard colectomy done in the past. Denies light headedness/dizziness. He denies abd pain.   Per pts pmd pt takes diclofenac and will not stop despite having been told by his PMD to stop.    Patient was previously seen by doctor Hicks.  He was last seen in 2013 has not followed up since that time.  He has a known history of atrial fibrillation and was off of oral anticoagulation for over 5 years due to GI bleed history.  He again presented with significant lower bleeding and is status post endoscopy.  He is out of shape in when he gets up his heart rate spikes as high as the 180s.  He has not had a cardiac evaluation in several years.  He denies any PND, orthopnea or lower extremity edema.  He has not experiencing dizziness, presyncope or syncope.  He gets short winded with minimal activity.

## 2019-04-10 NOTE — NURSING
Bedside Report given to nurse Crowley. Visualized and assessed patient NAD noted. Safety precautions maintained and call light within reach.    Chart check completed.

## 2019-04-10 NOTE — NURSING
Spoke with Dr. Wharton with results of patient bladder scan. Patient has 511ml in bladder noted. New orders put in to system for medication (flomax) now and recheck bladder scan in 4 hours.

## 2019-04-10 NOTE — ASSESSMENT & PLAN NOTE
Has significant increase in heart rate with underlying issues  Increase metoprolol as tolerated by blood pressure  No indication for AAD with chronicity unlikely will improve as he recovers from acute presentation

## 2019-04-11 VITALS
RESPIRATION RATE: 18 BRPM | BODY MASS INDEX: 41.07 KG/M2 | TEMPERATURE: 98 F | OXYGEN SATURATION: 93 % | WEIGHT: 277.31 LBS | DIASTOLIC BLOOD PRESSURE: 94 MMHG | HEART RATE: 85 BPM | HEIGHT: 69 IN | SYSTOLIC BLOOD PRESSURE: 121 MMHG

## 2019-04-11 PROCEDURE — 99232 SBSQ HOSP IP/OBS MODERATE 35: CPT | Mod: ,,, | Performed by: INTERNAL MEDICINE

## 2019-04-11 PROCEDURE — 94761 N-INVAS EAR/PLS OXIMETRY MLT: CPT

## 2019-04-11 PROCEDURE — 99232 PR SUBSEQUENT HOSPITAL CARE,LEVL II: ICD-10-PCS | Mod: ,,, | Performed by: INTERNAL MEDICINE

## 2019-04-11 PROCEDURE — 25000003 PHARM REV CODE 250: Performed by: INTERNAL MEDICINE

## 2019-04-11 PROCEDURE — 99900035 HC TECH TIME PER 15 MIN (STAT)

## 2019-04-11 PROCEDURE — 94640 AIRWAY INHALATION TREATMENT: CPT

## 2019-04-11 PROCEDURE — 25000242 PHARM REV CODE 250 ALT 637 W/ HCPCS: Performed by: INTERNAL MEDICINE

## 2019-04-11 RX ORDER — TAMSULOSIN HYDROCHLORIDE 0.4 MG/1
0.8 CAPSULE ORAL DAILY
Qty: 60 CAPSULE | Refills: 3 | Status: SHIPPED | OUTPATIENT
Start: 2019-04-11 | End: 2019-10-09

## 2019-04-11 RX ORDER — METOPROLOL TARTRATE 100 MG/1
50 TABLET ORAL 2 TIMES DAILY
Qty: 60 TABLET | Refills: 3 | Status: SHIPPED | OUTPATIENT
Start: 2019-04-11 | End: 2022-06-17 | Stop reason: SDUPTHER

## 2019-04-11 RX ADMIN — IPRATROPIUM BROMIDE 0.5 MG: 0.5 SOLUTION RESPIRATORY (INHALATION) at 12:04

## 2019-04-11 RX ADMIN — LOSARTAN POTASSIUM 100 MG: 25 TABLET, FILM COATED ORAL at 09:04

## 2019-04-11 RX ADMIN — IPRATROPIUM BROMIDE 0.5 MG: 0.5 SOLUTION RESPIRATORY (INHALATION) at 07:04

## 2019-04-11 RX ADMIN — TAMSULOSIN HYDROCHLORIDE 0.8 MG: 0.4 CAPSULE ORAL at 09:04

## 2019-04-11 RX ADMIN — PRAVASTATIN SODIUM 40 MG: 40 TABLET ORAL at 09:04

## 2019-04-11 RX ADMIN — IPRATROPIUM BROMIDE 0.5 MG: 0.5 SOLUTION RESPIRATORY (INHALATION) at 01:04

## 2019-04-11 RX ADMIN — METOPROLOL TARTRATE 100 MG: 50 TABLET ORAL at 09:04

## 2019-04-11 RX ADMIN — FLUTICASONE FUROATE AND VILANTEROL TRIFENATATE 1 PUFF: 100; 25 POWDER RESPIRATORY (INHALATION) at 07:04

## 2019-04-11 RX ADMIN — ALLOPURINOL 100 MG: 100 TABLET ORAL at 09:04

## 2019-04-11 NOTE — NURSING
Bladder scan 258 mL  No UO voided for shift  will continue to monitor pt UO  And report to oncoming nurse

## 2019-04-11 NOTE — NURSING
Bladder scan initiated 4hrs post Tamsulosin  Bladder scan: 670 mL  Dr. Wharton notified, recommended straight cath, bladder scan in 6hrs  Pt straight cath Otpt: 1000mL

## 2019-04-11 NOTE — PROGRESS NOTES
Progress Note    Admit Date: 4/5/2019   LOS: 6 days     SUBJECTIVE:     Follow-up For:  Rectal bleeding     Interval History    04/11/19: continue to have urinary retention.   04/10/19: had CT ab done. No fever.   04/09/19: Had C-scope yesterday. Some ab pain. No hematochezia today   04/08/19: intermittent hematochezia. + Ab. Pain. No fever or chills.   04/07/19: continue to have hematochezia. Hg dropped, developed AFib with RVR  04/06/19: had several bloody stool last night. Hg mild drop. Had EGD yesterday.       Scheduled Meds:   allopurinol  100 mg Oral Daily    fluticasone-vilanterol  1 puff Inhalation Daily    ipratropium  0.5 mg Nebulization Q6H    losartan  100 mg Oral Daily    metoprolol tartrate  100 mg Oral BID    pravastatin  40 mg Oral Daily    tamsulosin  0.8 mg Oral Daily     Continuous Infusions:    PRN Meds:sodium chloride, metoprolol, oxyCODONE-acetaminophen, sodium chloride 0.9%    Review of patient's allergies indicates:   Allergen Reactions    Bee sting [allergen ext-venom-honey bee]     Culver clement (solidago canadensis)     Kenalog [triamcinolone acetonide]     Lidocaine        Review of Systems    Constitutional: some fatigue   Eyes: no visual changes   ENT: no nasal congestion. Denies sore throat with odynophagia   Respiratory: No cough, SOB, exertional dyspnea or hemoptysis   Cardiovascular: NO cp.  Gastrointestinal: see HPI. Ab. Pain better   Hematologic/Lymphatic: see HPI   Musculoskeletal: chronic knee and ankle pain  Neurological: no seizures or tremors, gait or balance prblems  Skin: No rashes or lesions  Psych: Denies any anxiety, depression or insomnia  : continue to have urinary retention- tolerated in and out but want to Fatima     OBJECTIVE:     Vital Signs (Most Recent)  Temp: 97.9 °F (36.6 °C) (04/11/19 0420)  Pulse: 63 (04/11/19 0420)  Resp: 18 (04/11/19 0420)  BP: (!) 114/58 (04/11/19 0420)  SpO2: 95 % (04/11/19 0420)    Vital Signs Range (Last 24H):  Temp:  [97.5 °F  (36.4 °C)-98.6 °F (37 °C)]   Pulse:  []   Resp:  [16-20]   BP: (109-137)/(58-80)   SpO2:  [92 %-96 %]     I & O (Last 24H):    Intake/Output Summary (Last 24 hours) at 4/11/2019 0645  Last data filed at 4/10/2019 2359  Gross per 24 hour   Intake 358 ml   Output 1200 ml   Net -842 ml     Physical Exam:    General: NAD, resting in bed.   Head: normocephalic, atraumatic   Eyes: conjunctivae pink, anicteric sclera.   Throat: No erythema or post nasal discharge   Neck: supple, no LAD   Lungs: decreased BS at the bedside   Heart: S1, S2, RR  Abdomen: obese, some generalized tenderness.   Extremities: no cyanosis or edema.   Skin: turgor normal, no erythema or rashes.   MS: trace pedal edema, viv knee and ankle / foot tenderness   Neuro: A&O x 3   Psy: calm     Laboratory:    CBC:   Recent Labs   Lab 04/05/19  1644  04/09/19  0536   WBC 7.80  --   --    RBC 4.87  --   --    HGB 13.4*   < > 11.8*   HCT 40.4   < > 35.8*     --   --    MCV 83  --   --    MCH 27.5  --   --    MCHC 33.2  --   --     < > = values in this interval not displayed.     CMP:   Recent Labs   Lab 04/06/19  0527 04/08/19  0623   GLU 95 107   CALCIUM 9.9 10.0   ALBUMIN 3.5  --    PROT 7.1  --     138   K 4.7 4.1   CO2 19* 22*   * 108   BUN 44* 18   CREATININE 1.7* 1.5*   ALKPHOS 83  --    ALT 26  --    AST 17  --    BILITOT 0.8  --      Coagulation:   Recent Labs   Lab 04/05/19  1035   LABPROT 11.1   INR 1.1   APTT 29.5     Cardiac markers:   Recent Labs   Lab 04/05/19  1035   TROPONINI 0.007     Microbiology Results (last 7 days)     ** No results found for the last 168 hours. **        Specimen (12h ago, onward)    None        No results for input(s): COLORU, CLARITYU, SPECGRAV, PHUR, PROTEINUA, GLUCOSEU, BILIRUBINCON, BLOODU, WBCU, RBCU, BACTERIA, MUCUS, NITRITE, LEUKOCYTESUR, UROBILINOGEN, HYALINECASTS in the last 168 hours.    Diagnostic Results:      ASSESSMENT/PLAN:       Active Hospital Problems     Diagnosis   POA     *Gastrointestinal hemorrhage [K92.2]  - most probably due to UGI bleeding   - seen by GI and underwent EGD- no acute finding  - on protonix drip  - check H&H q 6 h for   - Hg dropped to 10.7g  - continue to have hematochezia- pt needs C-scope vs. Tagged rbc scan if continue to have rectal bleeding   - discussed with Dr. Millan   - transfused 2 units prb  - s/p C-scope on 04/08- No acute finding  - pt may need capsular endoscopy- can be done out pt    - Hematochezia resolved       Yes    Acute blood loss anemia [D62]  - continue to monitor       Yes    JOHNY (acute kidney injury) [N17.9]   Yes     - improving   - continue hydration  - on clears   Due to ATN  - hydration   - labs in the am        Hypotension [I95.9]  - resolved after getting IV fluids in the ED  - hold off on diuretics         Yes       Resolved Hospital Problems   No resolved problems to display.      DVT pro: SCDs     Gen joint pain/Osteoarthritis: No NSAIDs              - prn percocet     Afib with RVR: mostly due to acute GI bleeding   - Lopressor IV with parameters     Ab pain: generalized   - CT ab/pelvis - no acute finding    Urinary retention: Flomax   -despite Flomax high dose- unable to urinate   - will re insert Fatima and dc home for pt to f/u with urology       Umang Wharton M.D  Internal Medicine & Geriatric Medicine  Hematology & Oncology  Palliative Medicine    [address], [address]  [place] [address]  [phone] (Office)  [phone] (Fax)

## 2019-04-11 NOTE — PLAN OF CARE
Problem: Fall Injury Risk  Goal: Absence of Fall and Fall-Related Injury    Intervention: Identify and Manage Contributors to Fall Injury Risk     04/11/19 1653   Manage Acute Allergic Reaction   Medication Review/Management medications reviewed;high risk medications identified   Identify and Manage Contributors to Fall Injury Risk   Self-Care Promotion BADL personal objects within reach;BADL personal routines maintained

## 2019-04-11 NOTE — NURSING
Bedside report given to HARSH Garcia. Patient sitting up in bed. NAD noted at this time. All safety precautions in place.  12 hour chart check comeplete

## 2019-04-11 NOTE — SUBJECTIVE & OBJECTIVE
Interval History:  No current complaints.  No acute events per nursing    Telemetry:  AFib rate controlled 60 stay 80s    Review of Systems   All other systems reviewed and are negative.    Objective:     Vital Signs (Most Recent):  Temp: 98.3 °F (36.8 °C) (04/11/19 0751)  Pulse: 81 (04/11/19 0751)  Resp: 17 (04/11/19 0751)  BP: 115/79 (04/11/19 0751)  SpO2: 100 % (04/11/19 0751) Vital Signs (24h Range):  Temp:  [97.9 °F (36.6 °C)-98.6 °F (37 °C)] 98.3 °F (36.8 °C)  Pulse:  [] 81  Resp:  [16-20] 17  SpO2:  [91 %-100 %] 100 %  BP: (109-132)/(58-79) 115/79     Weight: 125.8 kg (277 lb 5.4 oz)  Body mass index is 40.96 kg/m².     SpO2: 100 %  O2 Device (Oxygen Therapy): room air      Intake/Output Summary (Last 24 hours) at 4/11/2019 0859  Last data filed at 4/10/2019 2359  Gross per 24 hour   Intake 240 ml   Output 1000 ml   Net -760 ml       Lines/Drains/Airways     Airway                 Airway - Non-Surgical 04/08/19 1511 Nasal Cannula 2 days          Peripheral Intravenous Line                 Peripheral IV - Single Lumen 04/09/19 1130 Anterior;Right Forearm 1 day                Physical Exam   Constitutional: He appears well-developed and well-nourished. No distress.   HENT:   Head: Normocephalic and atraumatic.   Eyes: Pupils are equal, round, and reactive to light. Conjunctivae and EOM are normal.   Cardiovascular: Normal rate. An irregularly irregular rhythm present.   Pulmonary/Chest: Effort normal and breath sounds normal.   Musculoskeletal: He exhibits no edema.   Neurological: He is alert.   Skin: Skin is warm and dry.   Psychiatric: He has a normal mood and affect. His behavior is normal.       Significant Labs: BMP: No results for input(s): GLU, NA, K, CL, CO2, BUN, CREATININE, CALCIUM, MG in the last 48 hours. and CBC No results for input(s): WBC, HGB, HCT, PLT in the last 48 hours.    Significant Imaging: Echocardiogram:   Transthoracic echo (TTE) complete (Cupid Only):   Results for orders  placed or performed during the hospital encounter of 04/05/19   Transthoracic echo (TTE) 2D with Color Flow   Result Value Ref Range    BSA 2.48 m2    LA WIDTH 5.46 cm    AORTIC VALVE CUSP SEPERATION 1.77 cm    PV PEAK VELOCITY 0.89 cm/s    LVIDD 4.50 3.5 - 6.0 cm    IVS 1.48 (A) 0.6 - 1.1 cm    PW 1.38 (A) 0.6 - 1.1 cm    Ao root annulus 3.91 cm    LVIDS 2.81 2.1 - 4.0 cm    FS 38 28 - 44 %    LA volume 143.89 cm3    Sinus 3.71 cm    STJ 3.33 cm    LV mass 256.49 g    LA size 4.82 cm    RVDD 3.79 cm    TAPSE 1.77 cm    RV S' 8.49 m/s    Left Ventricle Relative Wall Thickness 0.61 cm    AV mean gradient 5.71 mmHg    AV valve area 2.99 cm2    AV Velocity Ratio 0.78     AV index (prosthetic) 0.80     IVRT 0.06 msec    LVOT diameter 2.19 cm    LVOT area 3.76 cm2    LVOT peak bashir 1.09210360 m/s    LVOT peak VTI 22.01 cm    Ao peak bashir 1.49 m/s    Ao VTI 27.68 cm    LVOT stroke volume 82.87 cm3    AV peak gradient 8.88 mmHg    MV Peak E Bashir 0.98 m/s    TR Max Bashir 2.59 m/s    LV Systolic Volume 29.70 mL    LV Systolic Volume Index 12.5 mL/m2    LV Diastolic Volume 92.30 mL    LV Diastolic Volume Index 38.85 mL/m2    LA Volume Index 60.6 mL/m2    LV Mass Index 108.0 g/m2    RA Major Axis 6.20 cm    Left Atrium Minor Axis 5.90 cm    Left Atrium Major Axis 7.07 cm    Triscuspid Valve Regurgitation Peak Gradient 26.83 mmHg    RA Width 4.28 cm    Right Atrial Pressure (from IVC) 3 mmHg    TV rest pulmonary artery pressure 30 mmHg

## 2019-04-11 NOTE — PLAN OF CARE
04/11/19 1015   Medicare Message   Important Message from Medicare regarding Discharge Appeal Rights Given to patient/caregiver;Explained to patient/caregiver;Signed/date by patient/caregiver

## 2019-04-11 NOTE — PHYSICIAN QUERY
PT Name: John E Sandifer  MR #: [id number]     Physician Query Form - Documentation Clarification      Paola Nagy RN, CCDS  Desk # [phone]; alba # [phone] [email or web address]    This form is a permanent document in the medical record.     Query Date: April 11, 2019    By submitting this query, we are merely seeking further clarification of documentation. Please utilize your independent clinical judgment when addressing the question(s) below.    The Medical record reflects the following:    Supporting Clinical Findings Location in Medical Record   Impression:      - Normal esophagus.  - Gastritis.  - Erythematous duodenopathy.  - No specimens collected.     Rec's:  - Use Protonix (pantoprazole) 40 mg PO daily.   EGD Report                                                                               Doctor, Please specify diagnosis or diagnoses associated with above clinical findings.    Provider Use Only      (  ) Chronic without bleeding     (  ) Acute without bleeding     (  ) Acute erosive without bleeding     (  ) Chronic erosive without bleeding     (  ) Superficial chronic without bleeding     (  x) Other gastritis - specify type and bleeding status:                  Type: chronic gastritis and duodenitis_____________                  Specify: (  )With bleeding;  (x)Without bleeding     ( x ) Other diagnosis: ________________________     (  ) Unspecified gastritis                                                                                                         [  ]  Clinically Undetermined

## 2019-04-12 NOTE — DISCHARGE SUMMARY
[facility]  Discharge Summary      Patient Name: John E Sandifer  MRN: [id number]  Admission Date: 4/5/2019  Hospital Length of Stay: 6 days  Discharge Date and Time: 4/11/2019  9:08 PM  Attending Physician: Siobhan att. providers found   Discharging Provider: Umang Wharton MD  Primary Care Provider: Umang Wharton MD    HPI:     Mr. Sandifer is a pleasant 74 YO gentleman with multiple medical conditions including Afib, HTN, COPD, obesity, as well as OA on chronic use of prn NSAIDs. He was in his [facility] state of health until few weeks ago when he developed hematuria. Over the patst fdw days, not feeling well and began to have bright blood in stool x 2 days. Not improving. Some nausea. Denies changes in appetite or weight loss. Has moderate knee and ankle pain (recent ankle injury) and take NSAIDs. Off Coumadin. In the ED pt was found to have JOHNY and hypotension (given IV fluids)      Procedure(s) (LRB):  COLONOSCOPY (N/A)     Hospital Course:     During this hospitalization, these following conditions were addressed and managed along with other comorbid conditions:    Active Hospital Problems     Diagnosis   POA    *Gastrointestinal hemorrhage [K92.2]  - most probably due to UGI bleeding   - seen by GI and underwent EGD- no acute finding  - on protonix drip  - check H&H q 6 h for   - Hg dropped to 10.7g  - continue to have hematochezia- pt needs C-scope vs. Tagged rbc scan if continue to have rectal bleeding   - discussed with Dr. Millan   - transfused 2 units prb  - s/p C-scope on 04/08- No acute finding  - pt may need capsular endoscopy- can be done out pt    - Hematochezia resolved       Yes    Acute blood loss anemia [D62]  - continue to monitor       Yes    JOHNY (acute kidney injury) [N17.9]   Yes     - improving   - continue hydration  - on clears   Due to ATN  - hydration   - labs in the am        Hypotension [I95.9]  - resolved after getting IV fluids in the ED  - hold off on  diuretics         Yes       Resolved Hospital Problems   No resolved problems to display.      DVT pro: SCDs     Gen joint pain/Osteoarthritis: No NSAIDs              - prn percocet      Afib with RVR: mostly due to acute GI bleeding              - Lopressor IV with parameters   - increased dose of oral lopressor to 100mg bid    - advised pt to take Low dose ASA (ASA 81mg) starting tomorrow as no hematochezia x 48 hours      Ab pain: generalized              - CT ab/pelvis - no acute finding     Urinary retention: Flomax              -despite Flomax high dose- unable to urinate              - will re insert Fatima and dc home for pt to f/u with urology            Consults:   Consults (From admission, onward)        Status Ordering Provider     Inpatient consult to Cardiology  Once     Provider:  Chico Gallo MD    Completed UMANG WILDER          Significant Diagnostic Studies: see above    Pending Diagnostic Studies:     None        Final Active Diagnoses:    Diagnosis Date Noted POA    PRINCIPAL PROBLEM:  Gastrointestinal hemorrhage [K92.2] 04/05/2019 Yes    Acute blood loss anemia [D62] 04/05/2019 Yes    JOHNY (acute kidney injury) [N17.9] 04/05/2019 Yes    Hypotension [I95.9] 04/05/2019 Yes    Chronic a-fib [I48.2] 09/09/2015 Yes    NELLY (obstructive sleep apnea) [G47.33] 09/20/2013 Yes    HTN (hypertension) [I10] 09/04/2013 Yes    HLD (hyperlipidemia) [E78.5] 09/04/2013 Yes      Problems Resolved During this Admission:      Discharged Condition: stable    Disposition: Home or Self Care     Activity: as tolerated    Diet: cardiac     Follow Up:  Follow-up Information     Zora Fleming NP On 4/26/2019.    Specialty:  Urology  Why:  @9:00am FOR UROLOGY FOLLOW UP  Contact information:  [address]  [address] [place] [id number]  [phone]             Umang Wilder MD On 4/30/2019.    Specialties:  Internal Medicine, Oncology, Hematology and Oncology  Why:  @9:15am, for Hospital Follow  up  Contact information:  [address] [facility]  [address]  [place] [address]  [phone]                 Patient Instructions:   No discharge procedures on file.  Medications:  Reconciled Home Medications:      Medication List      CHANGE how you take these medications    metoprolol tartrate 100 MG tablet  Commonly known as:  LOPRESSOR  Take 0.5 tablets (50 mg total) by mouth 2 (two) times daily.  What changed:  medication strength     tamsulosin 0.4 mg Cap  Commonly known as:  FLOMAX  Take 2 capsules (0.8 mg total) by mouth once daily.  What changed:  how much to take        CONTINUE taking these medications    ADVAIR DISKUS 250-50 mcg/dose diskus inhaler  Generic drug:  fluticasone-salmeterol 250-50 mcg/dose  INHALE 1 PUFF BY MOUTH INTO THE LUNGS TWICE DAILY     * albuterol 2.5 mg /3 mL (0.083 %) nebulizer solution  Commonly known as:  PROVENTIL  Take 2.5 mg by nebulization every 6 (six) hours as needed for Wheezing. Rescue     * albuterol 90 mcg/actuation inhaler  Commonly known as:  PROAIR HFA  INHALE 2 PUFFS INTO THE LUNGS EVERY 6 (SIX) HOURS AS NEEDED FOR WHEEZING.     allopurinol 100 MG tablet  Commonly known as:  ZYLOPRIM  TAKE 1 TABLET BY MOUTH EVERY DAY     amLODIPine 10 MG tablet  Commonly known as:  NORVASC  TAKE 1 TABLET BY MOUTH EVERY DAY     losartan 100 MG tablet  Commonly known as:  COZAAR  TAKE 1 TABLET BY MOUTH ONCE DAILY     meloxicam 7.5 MG tablet  Commonly known as:  MOBIC  Take 1 tablet (7.5 mg total) by mouth once daily.     miscellaneous medical supply Kit  1 Units by Misc.(Non-Drug; Combo Route) route nightly.     nebulizer and compressor Natalya  1 Units by Misc.(Non-Drug; Combo Route) route once daily.     omeprazole 20 MG capsule  Commonly known as:  PRILOSEC  TAKE 1 CAPSULE(20 MG) BY MOUTH TWICE DAILY     OSTEO BI-FLEX (5-LOXIN) ORAL  Take 2 tablets by mouth every evening.     pravastatin 40 MG tablet  Commonly known as:  PRAVACHOL  TAKE 1 TABLET(40 MG) BY MOUTH EVERY DAY      spironolactone 25 MG tablet  Commonly known as:  ALDACTONE  TAKE 1 TABLET (25 MG TOTAL) BY MOUTH 2 (TWO) TIMES DAILY.         * This list has 2 medication(s) that are the same as other medications prescribed for you. Read the directions carefully, and ask your doctor or other care provider to review them with you.            ASA 81mg daily starting 04/12        Umang Wharton MD  Hematology/Oncology  [facility]

## 2019-04-12 NOTE — PLAN OF CARE
04/12/19 0810   Final Note   Assessment Type Final Discharge Note   Anticipated Discharge Disposition Home   What phone number can be called within the next 1-3 days to see how you are doing after discharge?   ([phone])   Hospital Follow Up  Appt(s) scheduled? Yes   Discharge plans and expectations educations in teach back method with documentation complete? Yes   Right Care Referral Info   Post Acute Recommendation No Care

## 2019-04-12 NOTE — PLAN OF CARE
04/12/19 0810   Post-Acute Status   Post-Acute Authorization Other   Other Status No Post-Acute Service Needs

## 2019-04-12 NOTE — NURSING
Discharge orders explained, along with medication changes. D/c'd right arm saline locked. Fatima patent, leg bag firmly secured. Pt transported by wheelchair with belongings to ED entrance, by charge nurse Benjamin. Picked up by neighbor.

## 2019-04-18 ENCOUNTER — OFFICE VISIT (OUTPATIENT)
Dept: UROLOGY | Facility: CLINIC | Age: 74
End: 2019-04-18
Payer: MEDICARE

## 2019-04-18 VITALS
HEIGHT: 69 IN | SYSTOLIC BLOOD PRESSURE: 118 MMHG | WEIGHT: 282 LBS | BODY MASS INDEX: 41.77 KG/M2 | DIASTOLIC BLOOD PRESSURE: 70 MMHG

## 2019-04-18 DIAGNOSIS — N40.1 BPH WITH OBSTRUCTION/LOWER URINARY TRACT SYMPTOMS: ICD-10-CM

## 2019-04-18 DIAGNOSIS — N13.8 BPH WITH OBSTRUCTION/LOWER URINARY TRACT SYMPTOMS: ICD-10-CM

## 2019-04-18 DIAGNOSIS — R31.0 GROSS HEMATURIA: ICD-10-CM

## 2019-04-18 DIAGNOSIS — R33.9 URINARY RETENTION: Primary | ICD-10-CM

## 2019-04-18 PROCEDURE — 51700 IRRIGATION OF BLADDER: CPT | Mod: PBBFAC | Performed by: NURSE PRACTITIONER

## 2019-04-18 PROCEDURE — 99999 PR PBB SHADOW E&M-EST. PATIENT-LVL IV: CPT | Mod: PBBFAC,,, | Performed by: NURSE PRACTITIONER

## 2019-04-18 PROCEDURE — 99214 OFFICE O/P EST MOD 30 MIN: CPT | Mod: PBBFAC | Performed by: NURSE PRACTITIONER

## 2019-04-18 PROCEDURE — 99214 PR OFFICE/OUTPT VISIT, EST, LEVL IV, 30-39 MIN: ICD-10-PCS | Mod: S$PBB,25,, | Performed by: NURSE PRACTITIONER

## 2019-04-18 PROCEDURE — 99999 PR PBB SHADOW E&M-EST. PATIENT-LVL IV: ICD-10-PCS | Mod: PBBFAC,,, | Performed by: NURSE PRACTITIONER

## 2019-04-18 PROCEDURE — 99214 OFFICE O/P EST MOD 30 MIN: CPT | Mod: S$PBB,25,, | Performed by: NURSE PRACTITIONER

## 2019-04-18 PROCEDURE — 51702 INSERT TEMP BLADDER CATH: CPT | Mod: PBBFAC | Performed by: NURSE PRACTITIONER

## 2019-04-18 PROCEDURE — 51702 PR INSERTION OF TEMPORARY INDWELLING BLADDER CATHETER, SIMPLE: ICD-10-PCS | Mod: S$PBB,,, | Performed by: NURSE PRACTITIONER

## 2019-04-18 PROCEDURE — 51702 INSERT TEMP BLADDER CATH: CPT | Mod: S$PBB,,, | Performed by: NURSE PRACTITIONER

## 2019-04-18 NOTE — PROGRESS NOTES
Subjective:       Patient ID: John E Sandifer is a 73 y.o. male who was last seen in this office 4/5/2019    Chief Complaint:   Chief Complaint   Patient presents with    Follow-up     Patient states since the hospital stay he feels short of breathe.. and out of it.. has catheter but states he hasn't seen any blood in his urine or stool since last week Wednesday       Urinary Retention  Patient complains of urinary retention. Onset of retention was several days ago and was sudden in onset. Patient currently does have a urinary catheter in place.  2L of urine were drained when catheter was placed. Prior to this event voiding symptoms consisted of slow stream, intermittency, nocturia x 2. Prior treatments include none. Recent medications that may have affected his voiding include none.  Denies prior occurrences of urinary retention.    He presented to the ER on 3/21/19 with a 2 day history of urinary retention. Jaimes placed in the ER and patient started on Flomax x 10 days.     He had a voiding trial in this office on 3/29/19 that was unsuccessful. Jaimes was replaced. He was seen in this office 3 days ago with c/o decreased UOP via jaimes bag. Jaimes irrigated by myself without difficulty. No complications noted with jaimes catheter at that time.    Repeat voiding trial on 4/5/19 was cautiously successful. He reported bloody stools and noted to be hypotensive during his last office visit. He was later admitted for GI blood, hypotension and JOHNY on 4/5/19. He underwent EGD and colonoscopy during admission--no acute findings. Of note, JOHNY and hypotension improved with hydration    Patient unable to urinate during admission. Subsequently jaimes catheter replaced on 4/9/19.  2.4L of urine drained with jaimes insertion. He is currently taking Flomax BID    He is here today for a voiding trial.  He is tolerating his jaimes. Denies dysuria, gross hematuria or flank pain    Hematuria  Patient complains of gross hematuria. Onset of  hematuria was several days ago and was sudden in onset. There is not a history of nephrolithiasis. There is not a history of urologic trauma. Other urologic symptoms include slow stream, intermittency, nocturia. Patient admits to history of tobacco use. Patient denies history of Agent Orange exposure, chronic Jaimes catheter,  surgeries, sexually transmitted diseases, trauma and urolithiasis. Prior workup has been none.    Patient presented back to the ER the following day with c/o gross hematuria after having jaimes placed. He denies any difficulty or trauma with jaimes placement.  Jaimes catheter noted to be obstructed with blood clots. Catheter was irrigated and replaced in the ER.  He has not had any further occurrences of gross hematuria since this time    Bladder scan--0 ml (previous visit)    ACTIVE MEDICAL ISSUES:  Patient Active Problem List   Diagnosis    A-fib    HTN (hypertension)    HLD (hyperlipidemia)    NELLY (obstructive sleep apnea)    Obesity    Essential hypertension, benign    Obstructive chronic bronchitis with exacerbation    Anticoagulated on Coumadin    COPD (chronic obstructive pulmonary disease)    Knee pain    OA (osteoarthritis) of knee    Chronic bronchitis    Body mass index 40.0-44.9, adult    Abnormal liver ultrasound    Chronic sinusitis    Epistaxis, recurrent    Encounter for current long-term use of anticoagulants    Allergic rhinitis    Conjunctivitis of left eye    SOB (shortness of breath)    Essential hypertension    Chronic a-fib    Pre-op evaluation    Deviated nasal septum    HTN, goal below 140/90    Left hip pain    Acute pain    Right leg pain    Gouty arthritis of right great toe    Acute gout of right ankle    Urinary retention    Gross hematuria    BPH with obstruction/lower urinary tract symptoms    Gastrointestinal hemorrhage    Acute blood loss anemia    JOHNY (acute kidney injury)    Hypotension       ALLERGIES AND MEDICATIONS:  updated and reviewed.  Review of patient's allergies indicates:   Allergen Reactions    Bee sting [allergen ext-venom-honey bee]     Culver clement (solidago canadensis)     Kenalog [triamcinolone acetonide]     Lidocaine      Current Outpatient Medications   Medication Sig    ADVAIR DISKUS 250-50 mcg/dose diskus inhaler INHALE 1 PUFF BY MOUTH INTO THE LUNGS TWICE DAILY    albuterol (PROAIR HFA) 90 mcg/actuation inhaler INHALE 2 PUFFS INTO THE LUNGS EVERY 6 (SIX) HOURS AS NEEDED FOR WHEEZING.    albuterol (PROVENTIL) 2.5 mg /3 mL (0.083 %) nebulizer solution Take 2.5 mg by nebulization every 6 (six) hours as needed for Wheezing. Rescue    allopurinol (ZYLOPRIM) 100 MG tablet TAKE 1 TABLET BY MOUTH EVERY DAY    amLODIPine (NORVASC) 10 MG tablet TAKE 1 TABLET BY MOUTH EVERY DAY    aspirin (ECOTRIN) 81 MG EC tablet Take 1 tablet (81 mg total) by mouth once daily.    GLUCOSAMINE/D3/BOSWELLIA TOO (OSTEO BI-FLEX, 5-LOXIN, ORAL) Take 2 tablets by mouth every evening.    losartan (COZAAR) 100 MG tablet TAKE 1 TABLET BY MOUTH ONCE DAILY    meloxicam (MOBIC) 7.5 MG tablet Take 1 tablet (7.5 mg total) by mouth once daily.    metoprolol tartrate (LOPRESSOR) 100 MG tablet Take 0.5 tablets (50 mg total) by mouth 2 (two) times daily.    miscellaneous medical supply Kit 1 Units by Misc.(Non-Drug; Combo Route) route nightly.    nebulizer and compressor Natalya 1 Units by Misc.(Non-Drug; Combo Route) route once daily.    omeprazole (PRILOSEC) 20 MG capsule TAKE 1 CAPSULE(20 MG) BY MOUTH TWICE DAILY    pravastatin (PRAVACHOL) 40 MG tablet TAKE 1 TABLET(40 MG) BY MOUTH EVERY DAY    spironolactone (ALDACTONE) 25 MG tablet TAKE 1 TABLET (25 MG TOTAL) BY MOUTH 2 (TWO) TIMES DAILY.    tamsulosin (FLOMAX) 0.4 mg Cap Take 2 capsules (0.8 mg total) by mouth once daily.     No current facility-administered medications for this visit.        Review of Systems   Constitutional: Negative for activity change, chills, fatigue, fever  "and unexpected weight change.   Eyes: Negative for discharge, redness and visual disturbance.   Respiratory: Negative for cough, shortness of breath and wheezing.    Cardiovascular: Negative for chest pain and leg swelling.   Gastrointestinal: Negative for abdominal distention, abdominal pain, constipation, diarrhea, nausea and vomiting.   Genitourinary: Negative for decreased urine volume, difficulty urinating, dysuria, flank pain, frequency, hematuria, penile pain and urgency.   Musculoskeletal: Negative for arthralgias, joint swelling and myalgias.   Skin: Negative for color change and rash.   Neurological: Negative for dizziness and light-headedness.   Psychiatric/Behavioral: Negative for behavioral problems and confusion. The patient is not nervous/anxious.        Objective:      Vitals:    04/18/19 0851   BP: 118/70   Weight: 127.9 kg (282 lb)   Height: 5' 9" (1.753 m)     Physical Exam   Constitutional: He is oriented to person, place, and time. He appears well-developed.   HENT:   Head: Normocephalic and atraumatic.   Nose: Nose normal.   Eyes: Conjunctivae are normal. Right eye exhibits no discharge. Left eye exhibits no discharge.   Neck: Normal range of motion. Neck supple. No tracheal deviation present. No thyromegaly present.   Cardiovascular: Normal rate and regular rhythm.    Pulmonary/Chest: Effort normal. No respiratory distress. He has no wheezes.   Abdominal: Soft. He exhibits no distension. There is no hepatosplenomegaly. There is no tenderness. There is no CVA tenderness. No hernia.   Genitourinary:   Genitourinary Comments: Jaimes draining yellow urine   Musculoskeletal: Normal range of motion. He exhibits no edema.   Neurological: He is alert and oriented to person, place, and time.   Skin: Skin is warm and dry. No rash noted. No erythema.     Psychiatric: He has a normal mood and affect. His behavior is normal. Judgment normal.       Urine dipstick shows not done--jaimes in place    Voiding " trial: 240cc instilled, 0cc voided    16 Fr catheter inserted by nurse using sterile technique under my supervision. Patient tolerated well without any immediate complications    Assessment:       1. Urinary retention    2. Gross hematuria    3. BPH with obstruction/lower urinary tract symptoms          Plan:       1. Urinary retention  -Jaimes placed with 2L UOP  -Previous voiding trial 3/29/19--unsuccessful. Jaimes replaced  - Voiding Trial 4/5/19--cautiously successful  -Jaimes replaced on 4/9/19 during hospital stay. 2.4L UOP with jaimes placement  - Voiding Trial today--unsuccessful. Jaimes replaced  -Stay on Flomax  -Plan for cysto/UDS on 4/30/19 with Dr. Zapata. Written consent obtained today    2. Gross hematuria  - Discussed etiology and workup of hematuria  -+ history of tobacco use   - CT urogram ordered  -Plan for cysto/UDS on 4/30/19    3. BPH with obstruction/lower urinary tract symptoms  -Stay on Flomax          Follow up in about 1 month (around 5/16/2019) for Follow up.

## 2019-04-18 NOTE — H&P (VIEW-ONLY)
Subjective:       Patient ID: John E Sandifer is a 73 y.o. male who was last seen in this office 4/5/2019    Chief Complaint:   Chief Complaint   Patient presents with    Follow-up     Patient states since the hospital stay he feels short of breathe.. and out of it.. has catheter but states he hasn't seen any blood in his urine or stool since last week Wednesday       Urinary Retention  Patient complains of urinary retention. Onset of retention was several days ago and was sudden in onset. Patient currently does have a urinary catheter in place.  2L of urine were drained when catheter was placed. Prior to this event voiding symptoms consisted of slow stream, intermittency, nocturia x 2. Prior treatments include none. Recent medications that may have affected his voiding include none.  Denies prior occurrences of urinary retention.    He presented to the ER on 3/21/19 with a 2 day history of urinary retention. Jaimes placed in the ER and patient started on Flomax x 10 days.     He had a voiding trial in this office on 3/29/19 that was unsuccessful. Jaimes was replaced. He was seen in this office 3 days ago with c/o decreased UOP via jaimes bag. Jaimes irrigated by myself without difficulty. No complications noted with jaimes catheter at that time.    Repeat voiding trial on 4/5/19 was cautiously successful. He reported bloody stools and noted to be hypotensive during his last office visit. He was later admitted for GI blood, hypotension and JOHNY on 4/5/19. He underwent EGD and colonoscopy during admission--no acute findings. Of note, JOHNY and hypotension improved with hydration    Patient unable to urinate during admission. Subsequently jaimes catheter replaced on 4/9/19.  2.4L of urine drained with jaimes insertion. He is currently taking Flomax BID    He is here today for a voiding trial.  He is tolerating his jaimes. Denies dysuria, gross hematuria or flank pain    Hematuria  Patient complains of gross hematuria. Onset of  hematuria was several days ago and was sudden in onset. There is not a history of nephrolithiasis. There is not a history of urologic trauma. Other urologic symptoms include slow stream, intermittency, nocturia. Patient admits to history of tobacco use. Patient denies history of Agent Orange exposure, chronic Jaimes catheter,  surgeries, sexually transmitted diseases, trauma and urolithiasis. Prior workup has been none.    Patient presented back to the ER the following day with c/o gross hematuria after having jaimes placed. He denies any difficulty or trauma with jaimes placement.  Jaimes catheter noted to be obstructed with blood clots. Catheter was irrigated and replaced in the ER.  He has not had any further occurrences of gross hematuria since this time    Bladder scan--0 ml (previous visit)    ACTIVE MEDICAL ISSUES:  Patient Active Problem List   Diagnosis    A-fib    HTN (hypertension)    HLD (hyperlipidemia)    NELLY (obstructive sleep apnea)    Obesity    Essential hypertension, benign    Obstructive chronic bronchitis with exacerbation    Anticoagulated on Coumadin    COPD (chronic obstructive pulmonary disease)    Knee pain    OA (osteoarthritis) of knee    Chronic bronchitis    Body mass index 40.0-44.9, adult    Abnormal liver ultrasound    Chronic sinusitis    Epistaxis, recurrent    Encounter for current long-term use of anticoagulants    Allergic rhinitis    Conjunctivitis of left eye    SOB (shortness of breath)    Essential hypertension    Chronic a-fib    Pre-op evaluation    Deviated nasal septum    HTN, goal below 140/90    Left hip pain    Acute pain    Right leg pain    Gouty arthritis of right great toe    Acute gout of right ankle    Urinary retention    Gross hematuria    BPH with obstruction/lower urinary tract symptoms    Gastrointestinal hemorrhage    Acute blood loss anemia    JOHNY (acute kidney injury)    Hypotension       ALLERGIES AND MEDICATIONS:  updated and reviewed.  Review of patient's allergies indicates:   Allergen Reactions    Bee sting [allergen ext-venom-honey bee]     Culver clement (solidago canadensis)     Kenalog [triamcinolone acetonide]     Lidocaine      Current Outpatient Medications   Medication Sig    ADVAIR DISKUS 250-50 mcg/dose diskus inhaler INHALE 1 PUFF BY MOUTH INTO THE LUNGS TWICE DAILY    albuterol (PROAIR HFA) 90 mcg/actuation inhaler INHALE 2 PUFFS INTO THE LUNGS EVERY 6 (SIX) HOURS AS NEEDED FOR WHEEZING.    albuterol (PROVENTIL) 2.5 mg /3 mL (0.083 %) nebulizer solution Take 2.5 mg by nebulization every 6 (six) hours as needed for Wheezing. Rescue    allopurinol (ZYLOPRIM) 100 MG tablet TAKE 1 TABLET BY MOUTH EVERY DAY    amLODIPine (NORVASC) 10 MG tablet TAKE 1 TABLET BY MOUTH EVERY DAY    aspirin (ECOTRIN) 81 MG EC tablet Take 1 tablet (81 mg total) by mouth once daily.    GLUCOSAMINE/D3/BOSWELLIA TOO (OSTEO BI-FLEX, 5-LOXIN, ORAL) Take 2 tablets by mouth every evening.    losartan (COZAAR) 100 MG tablet TAKE 1 TABLET BY MOUTH ONCE DAILY    meloxicam (MOBIC) 7.5 MG tablet Take 1 tablet (7.5 mg total) by mouth once daily.    metoprolol tartrate (LOPRESSOR) 100 MG tablet Take 0.5 tablets (50 mg total) by mouth 2 (two) times daily.    miscellaneous medical supply Kit 1 Units by Misc.(Non-Drug; Combo Route) route nightly.    nebulizer and compressor Natalya 1 Units by Misc.(Non-Drug; Combo Route) route once daily.    omeprazole (PRILOSEC) 20 MG capsule TAKE 1 CAPSULE(20 MG) BY MOUTH TWICE DAILY    pravastatin (PRAVACHOL) 40 MG tablet TAKE 1 TABLET(40 MG) BY MOUTH EVERY DAY    spironolactone (ALDACTONE) 25 MG tablet TAKE 1 TABLET (25 MG TOTAL) BY MOUTH 2 (TWO) TIMES DAILY.    tamsulosin (FLOMAX) 0.4 mg Cap Take 2 capsules (0.8 mg total) by mouth once daily.     No current facility-administered medications for this visit.        Review of Systems   Constitutional: Negative for activity change, chills, fatigue, fever  "and unexpected weight change.   Eyes: Negative for discharge, redness and visual disturbance.   Respiratory: Negative for cough, shortness of breath and wheezing.    Cardiovascular: Negative for chest pain and leg swelling.   Gastrointestinal: Negative for abdominal distention, abdominal pain, constipation, diarrhea, nausea and vomiting.   Genitourinary: Negative for decreased urine volume, difficulty urinating, dysuria, flank pain, frequency, hematuria, penile pain and urgency.   Musculoskeletal: Negative for arthralgias, joint swelling and myalgias.   Skin: Negative for color change and rash.   Neurological: Negative for dizziness and light-headedness.   Psychiatric/Behavioral: Negative for behavioral problems and confusion. The patient is not nervous/anxious.        Objective:      Vitals:    04/18/19 0851   BP: 118/70   Weight: 127.9 kg (282 lb)   Height: 5' 9" (1.753 m)     Physical Exam   Constitutional: He is oriented to person, place, and time. He appears well-developed.   HENT:   Head: Normocephalic and atraumatic.   Nose: Nose normal.   Eyes: Conjunctivae are normal. Right eye exhibits no discharge. Left eye exhibits no discharge.   Neck: Normal range of motion. Neck supple. No tracheal deviation present. No thyromegaly present.   Cardiovascular: Normal rate and regular rhythm.    Pulmonary/Chest: Effort normal. No respiratory distress. He has no wheezes.   Abdominal: Soft. He exhibits no distension. There is no hepatosplenomegaly. There is no tenderness. There is no CVA tenderness. No hernia.   Genitourinary:   Genitourinary Comments: Jaimes draining yellow urine   Musculoskeletal: Normal range of motion. He exhibits no edema.   Neurological: He is alert and oriented to person, place, and time.   Skin: Skin is warm and dry. No rash noted. No erythema.     Psychiatric: He has a normal mood and affect. His behavior is normal. Judgment normal.       Urine dipstick shows not done--jaimes in place    Voiding " trial: 240cc instilled, 0cc voided    16 Fr catheter inserted by nurse using sterile technique under my supervision. Patient tolerated well without any immediate complications    Assessment:       1. Urinary retention    2. Gross hematuria    3. BPH with obstruction/lower urinary tract symptoms          Plan:       1. Urinary retention  -Jaimes placed with 2L UOP  -Previous voiding trial 3/29/19--unsuccessful. Jaimes replaced  - Voiding Trial 4/5/19--cautiously successful  -Jaimes replaced on 4/9/19 during hospital stay. 2.4L UOP with jaimes placement  - Voiding Trial today--unsuccessful.   -Stay on Flomax  -Plan for cysto/UDS on 4/30/19 with Dr. Zapata. Written consent obtained today    2. Gross hematuria  - Discussed etiology and workup of hematuria  -+ history of tobacco use   - CT urogram ordered  -Plan for cysto/UDS on 4/30/19    3. BPH with obstruction/lower urinary tract symptoms  -Stay on Flomax          Follow up in about 1 month (around 5/16/2019) for Follow up.

## 2019-04-23 ENCOUNTER — HOSPITAL ENCOUNTER (OUTPATIENT)
Dept: RADIOLOGY | Facility: HOSPITAL | Age: 74
Discharge: HOME | End: 2019-04-23
Attending: NURSE PRACTITIONER
Payer: MEDICARE

## 2019-04-23 DIAGNOSIS — R31.0 GROSS HEMATURIA: ICD-10-CM

## 2019-04-23 PROCEDURE — 74178 CT UROGRAM ABD PELVIS W WO: ICD-10-PCS | Mod: 26,,, | Performed by: RADIOLOGY

## 2019-04-23 PROCEDURE — 74178 CT ABD&PLV WO CNTR FLWD CNTR: CPT | Mod: TC

## 2019-04-23 PROCEDURE — 25500020 PHARM REV CODE 255: Performed by: NURSE PRACTITIONER

## 2019-04-23 PROCEDURE — 74178 CT ABD&PLV WO CNTR FLWD CNTR: CPT | Mod: 26,,, | Performed by: RADIOLOGY

## 2019-04-23 RX ADMIN — IOHEXOL 125 ML: 350 INJECTION, SOLUTION INTRAVENOUS at 08:04

## 2019-04-24 ENCOUNTER — TELEPHONE (OUTPATIENT)
Dept: UROLOGY | Facility: CLINIC | Age: 74
End: 2019-04-24

## 2019-04-24 NOTE — TELEPHONE ENCOUNTER
Yes he does not need to be rescheduled for this upcoming Friday    Please inform patient:    CT uro--no kidney stones, hydronephrosis or suspicious masses on kidneys. Possible small cysts on right kidney. Enlarged prostate. Nothing on imaging to explain gross hematuria.     Recommend patient keep scheduled appointment for cysto/urodynamics with Dr. Zapata on 4/30/19

## 2019-04-24 NOTE — TELEPHONE ENCOUNTER
----- Message from Violet Cheatham LPN sent at 4/23/2019  3:51 PM CDT -----  Patient had CT scan done today and did have an apt for Friday at 9- it was cancelled and an apt was made for May- Can we give him his results over the phone ? He was worried about that and wanted to keep the Friday apt for results.

## 2019-04-26 ENCOUNTER — HOSPITAL ENCOUNTER (OUTPATIENT)
Dept: PREADMISSION TESTING | Facility: HOSPITAL | Age: 74
Discharge: HOME | End: 2019-04-26
Attending: UROLOGY
Payer: MEDICARE

## 2019-04-26 VITALS
WEIGHT: 286.19 LBS | TEMPERATURE: 97 F | OXYGEN SATURATION: 97 % | HEART RATE: 67 BPM | BODY MASS INDEX: 42.39 KG/M2 | RESPIRATION RATE: 20 BRPM | HEIGHT: 69 IN | SYSTOLIC BLOOD PRESSURE: 132 MMHG | DIASTOLIC BLOOD PRESSURE: 75 MMHG

## 2019-04-26 RX ORDER — DICLOFENAC SODIUM 50 MG/1
50 TABLET, DELAYED RELEASE ORAL DAILY
COMMUNITY
End: 2019-05-31 | Stop reason: CLARIF

## 2019-04-26 NOTE — PLAN OF CARE
Pre-operative instructions, medication directives and pain scales reviewed with Mr Sandifer.. All questions the patient had  were answered. Re-assurance about surgical procedure and day of surgery routine given as needed. Mr Sandifer verbalized understanding of the pre-op instructions.

## 2019-04-26 NOTE — DISCHARGE INSTRUCTIONS
"Your procedure  is scheduled for Tuesday 4/30__________.    Call [phone] between 2pm and 5pm on _Monday 4/29______to find out your arrival time for the day of surgery.    Report to Same Day Surgery Unit at ____ AM on the 2nd floor of the hospital.  Use the front entrance of the hospital.  The front doors of the hospital open promptly at 5:30am.  If you need wheelchair assistance, call [phone] from your cell phone, or call "0" from the courtesy phone in the lobby.    Important instructions:   MAY eat or drink after 12 midnight, including water.  It is okay to brush your teeth.  Do not have gum, candy or mints.     Take only these medications with a small swallow of water on the morning of your surgery __TAKE NORMAL MORNING MEDICINE____________        .                      Please shower the night before and the morning of your surgery.           You may wear deodorant only.      Do not wear powder, body lotion or perfume/cologne.     Do not wear any jewelry or have any metal on your body.     .   Wear loose fitting clothes allowing for bandages.     Please leave money and valuables home.       You may bring your cell phone.     Call the doctor if fever or illness should occur before your surgery.    Call [phone] to contact us here if needed.--[facility]  "

## 2019-04-30 ENCOUNTER — HOSPITAL ENCOUNTER (OUTPATIENT)
Facility: HOSPITAL | Age: 74
Discharge: HOME | End: 2019-04-30
Attending: UROLOGY | Admitting: UROLOGY
Payer: MEDICARE

## 2019-04-30 ENCOUNTER — HOSPITAL ENCOUNTER (OUTPATIENT)
Dept: RADIOLOGY | Facility: HOSPITAL | Age: 74
Discharge: HOME | End: 2019-04-30
Attending: UROLOGY
Payer: MEDICARE

## 2019-04-30 VITALS
WEIGHT: 284.81 LBS | HEART RATE: 71 BPM | TEMPERATURE: 98 F | OXYGEN SATURATION: 93 % | HEIGHT: 69 IN | BODY MASS INDEX: 42.18 KG/M2 | SYSTOLIC BLOOD PRESSURE: 143 MMHG | RESPIRATION RATE: 20 BRPM | DIASTOLIC BLOOD PRESSURE: 72 MMHG

## 2019-04-30 DIAGNOSIS — R33.9 URINARY RETENTION: ICD-10-CM

## 2019-04-30 DIAGNOSIS — R33.9 URINARY RETENTION: Primary | ICD-10-CM

## 2019-04-30 DIAGNOSIS — I48.0 PAROXYSMAL ATRIAL FIBRILLATION: ICD-10-CM

## 2019-04-30 PROCEDURE — 51784 ANAL/URINARY MUSCLE STUDY: CPT | Mod: 26,51,, | Performed by: UROLOGY

## 2019-04-30 PROCEDURE — 52000 PR CYSTOURETHROSCOPY: ICD-10-PCS | Mod: 59,,, | Performed by: UROLOGY

## 2019-04-30 PROCEDURE — 25000003 PHARM REV CODE 250: Performed by: UROLOGY

## 2019-04-30 PROCEDURE — 25500020 PHARM REV CODE 255: Performed by: UROLOGY

## 2019-04-30 PROCEDURE — 76000 FLUOROSCOPY <1 HR PHYS/QHP: CPT | Mod: 26,59,, | Performed by: UROLOGY

## 2019-04-30 PROCEDURE — 36000707: Performed by: UROLOGY

## 2019-04-30 PROCEDURE — 71000015 HC POSTOP RECOV 1ST HR: Performed by: UROLOGY

## 2019-04-30 PROCEDURE — 52000 CYSTOURETHROSCOPY: CPT | Mod: 59,,, | Performed by: UROLOGY

## 2019-04-30 PROCEDURE — 51728 CYSTOMETROGRAM W/VP: CPT | Mod: 26,,, | Performed by: UROLOGY

## 2019-04-30 PROCEDURE — 36000706: Performed by: UROLOGY

## 2019-04-30 PROCEDURE — 51797 PR VOIDING PRESS STUDY INTRA-ABDOMINAL VOID: ICD-10-PCS | Mod: 26,,, | Performed by: UROLOGY

## 2019-04-30 PROCEDURE — 51784 PR ANAL/URINARY MUSCLE STUDY: ICD-10-PCS | Mod: 26,51,, | Performed by: UROLOGY

## 2019-04-30 PROCEDURE — 76000 FLUOROSCOPY <1 HR PHYS/QHP: CPT | Mod: TC

## 2019-04-30 PROCEDURE — 51797 INTRAABDOMINAL PRESSURE TEST: CPT | Mod: 26,,, | Performed by: UROLOGY

## 2019-04-30 PROCEDURE — 76000 PR  FLUOROSCOPE EXAMINATION: ICD-10-PCS | Mod: 26,59,, | Performed by: UROLOGY

## 2019-04-30 PROCEDURE — 51728 PR COMPLEX CYSTOMETROGRAM VOIDING PRESSURE STUDIES: ICD-10-PCS | Mod: 26,,, | Performed by: UROLOGY

## 2019-04-30 RX ORDER — HYDROMORPHONE HYDROCHLORIDE 2 MG/ML
0.2 INJECTION, SOLUTION INTRAMUSCULAR; INTRAVENOUS; SUBCUTANEOUS EVERY 5 MIN PRN
Status: CANCELLED | OUTPATIENT
Start: 2019-04-30

## 2019-04-30 RX ORDER — PHENAZOPYRIDINE HYDROCHLORIDE 100 MG/1
200 TABLET, FILM COATED ORAL 3 TIMES DAILY PRN
Status: CANCELLED | OUTPATIENT
Start: 2019-04-30

## 2019-04-30 RX ORDER — ONDANSETRON 2 MG/ML
4 INJECTION INTRAMUSCULAR; INTRAVENOUS EVERY 12 HOURS PRN
Status: CANCELLED | OUTPATIENT
Start: 2019-04-30

## 2019-04-30 RX ORDER — SODIUM CHLORIDE 0.9 % (FLUSH) 0.9 %
10 SYRINGE (ML) INJECTION
Status: DISCONTINUED | OUTPATIENT
Start: 2019-04-30 | End: 2019-04-30 | Stop reason: HOSPADM

## 2019-04-30 RX ORDER — ACETAMINOPHEN 325 MG/1
650 TABLET ORAL EVERY 4 HOURS PRN
Status: CANCELLED | OUTPATIENT
Start: 2019-04-30

## 2019-04-30 RX ORDER — CIPROFLOXACIN 500 MG/1
500 TABLET ORAL
Status: COMPLETED | OUTPATIENT
Start: 2019-04-30 | End: 2019-04-30

## 2019-04-30 RX ADMIN — CIPROFLOXACIN HYDROCHLORIDE 500 MG: 500 TABLET, FILM COATED ORAL at 01:04

## 2019-04-30 NOTE — DISCHARGE INSTRUCTIONS
Expect blood in urine. Drink plenty of fluids.    ACTIVITY LEVEL: If you have received sedation or an anesthetic, you may feel sleepy for several hours. Rest until you are more awake. Gradually resume your normal activities.       DIET: You may resume your home diet. If nausea is present, increase your diet gradually with fluids and bland foods.      Medications: Pain medication should be taken only if needed and as directed. If antibiotics are prescribed, the medication should be taken until completed. You will be given an updated list of you medications.  ? No driving, alcoholic beverages or signing legal documents for next 24 hours or while taking pain medication    CALL THE DOCTOR:   · Fever over 101°F  · Severe pain that doesnt go away with medication.  · Upset stomach and vomiting that is persistent.  · Problems urinating-unable to urinate or heavy bleeding (with or without clots)    Fall Prevention  Millions of people fall every year and injure themselves. You may have had anesthesia or sedation which may increase your risk of falling. You may have health issues that put you at an increased risk of falling.     Here are ways to reduce your risk of falling.  ·   · Make your home safe by keeping walkways clear of objects you may trip over.  · Use non-slip pads under rugs. Do not use area rugs or small throw rugs.  · Use non-slip mats in bathtubs and showers.  · Install handrails and lights on staircases.  · Do not walk in poorly lit areas.  · Do not stand on chairs or wobbly ladders.  · Use caution when reaching overhead or looking upward. This position can cause a loss of balance.  · Be sure your shoes fit properly, have non-slip bottoms and are in good condition.   · Wear shoes both inside and out. Avoid going barefoot or wearing slippers.  · Be cautious when going up and down stairs, curbs, and when walking on uneven sidewalks.  · If your balance is poor, consider using a cane or walker.  · If your fall was  related to alcohol use, stop or limit alcohol intake.   · If your fall was related to use of sleeping medicines, talk to your doctor about this. You may need to reduce your dosage at bedtime if you awaken during the night to go to the bathroom.    · To reduce the need for nighttime bathroom trips:  ¨ Avoid drinking fluids for several hours before going to bed  ¨ Empty your bladder before going to bed  ¨ Men can keep a urinal at the bedside  · Stay as active as you can. Balance, flexibility, strength, and endurance all come from exercise. They all play a role in preventing falls. Ask your healthcare provider which types of activity are right for you.  · Get your vision checked on a regular basis.  · If you have pets, know where they are before you stand up or walk so you don't trip over them.  · Use night lights.

## 2019-04-30 NOTE — INTERVAL H&P NOTE
The patient has been examined and the H&P has been reviewed:    I concur with the findings and no changes have occurred since H&P was written.    Anesthesia/Surgery risks, benefits and alternative options discussed and understood by patient/family.          Active Hospital Problems    Diagnosis  POA    Paroxysmal atrial fibrillation [I48.0]  Yes      Resolved Hospital Problems   No resolved problems to display.

## 2019-04-30 NOTE — OP NOTE
[facility]  Surgery Department  Operative Note      Date of Procedure:  2019 1:37 PM     Procedure:   1. Complex urodynamics with fluoroscopy  2. Cystoscopy    Surgeon(s) and Role:     * Romina Zapata MD - Primary    Assisting Surgeon: None    Pre-Operative Diagnosis: Mixed incontinence urge and stress [788.33]    Post-Operative Diagnosis: Post-Op Diagnosis Codes:     * Mixed incontinence urge and stress [788.33]    Indication for procedure:  74yo M with high volume urinary retention. Gross hematuria noted after jaimes catheter placement. CT urogram without obvious etiology.     Description of procedure:  The patient was brought to the urodynamics suite and transferred to the urodynamics chair. Full timeout procedures were performed identifying the correct patient and procedure. Appropriate antibiotics with PO ciprofloxacin were given prior to commencement of surgery A 16-American red rubber catheter was then placed per urethra after genitals were prepped with Betadine solution to remove any residual urine in bladder.  P1 and P2 catheters were placed in the urethra into the bladder and rectally respectively. EMG senses were placed in the appropriate location on perineum and left leg. The bladder was filled at an appropriately slow rate.    PVR pre-procedure: NA - jaimes catheter in place    Filling phase:  Rate of fillin mL/min  First sensation: 77mL  First desire: 275mL  Strong desire: 387mL  Capacity: 623mL  Permission to void given at 623mL    Stress maneuvers (Valsalva and cough) at NA  VLLP: NA  Compliance: mildly decreased  ALLP: NA  Involuntary bladder contraction: none    Voiding phase:  Bladder contraction: suspect acontractile though increase in detrusor pressure may be a bladder contraction though suspect artifact. P1 removed and further attempts to void were unsuccessful.   Coordination: normal  Flow rate (max): NA  Flow rate (avg): NA  Pdet (max flow): NA  Max Pdet: NA  Voided  volume: 0mL  PVR: 623mL    Fluoroscopy:  Bladder wall: smooth, no intravesical prostate  Voiding: NA  Vesicoureteral reflux: none  Radiographic PVR: large volume      Cystoscopy:  At the conclusion of the urodynamics procedure, P1 and P2 catheters as well as EMG sensors were removed. The patient then was placed in the dorsal lithotomy position and prepped and draped in the usual sterile fashion. Uro-Jet lidocaine was placed in the urethra for adamaris-operative analgesia. A 16-[place] flexible cystoscope was passed per urethra into the bladder. Full cystoscopy was performed systematically to inspect all aspects of the bladder wall. Retroflexion of the cystoscope was done to inspect the bladder neck. Bilateral UOs were visualized. Urethra was carefully inspected upon removal of cystoscope. The procedure was terminated. The patient tolerated the procedure well.    Urethra: normal  Bladder mucosa: distended, no lesions. Mild erythema in posterior c/w catheter trauma.  Trabeculation: moderate (though bladder distended)  UOs: orthotopic, clear efflux  Prostate: large lateral lobes, no median lobe, long prostatic urethra. Significant varices at bladder neck.     18Fr jaimes catheter replaced into bladder.       Anesthesia: Local    Complications: none    Estimated Blood Loss (EBL): 0cc          Drains: none    Specimens: none     Attestation: I was present the entirety of the procedure.     Disposition:  Patient is stable and deemed appropriate for discharge home. The patient will follow up in 2-3 weeks.        Findings of the Procedure: Jaimes removed. Delayed sensation. No IBC/DO. Mildly decreased compliance. Normal coordination. Suspect no bladder contraction though detrusor pressure higher with attempt to void. Unable to void with/without P1 in place. Cystoscopy with large lateral lobes of prostate.    Recommendations: Recommend TURP though may have persistent UR due to atonic bladder.         Discharge Note    SUMMARY      Admit Date:  04/30/2019 1:39 PM    Discharge Date and Time:  04/30/2019 1:39 PM    Hospital Course (synopsis of major diagnoses, care, treatment, and services provided during the course of the hospital stay): Uncomplicated cysto/UDS.     Final Diagnosis: Post-Op Diagnosis Codes:     * Mixed incontinence urge and stress [788.33]    Disposition: Home or Self Care    Follow Up/Patient Instructions:   Expect blood and burning with urination. Drink plenty of fluids.    Medications:  Reconciled Home Medications:      Medication List      CONTINUE taking these medications    ADVAIR DISKUS 250-50 mcg/dose diskus inhaler  Generic drug:  fluticasone-salmeterol 250-50 mcg/dose  INHALE 1 PUFF BY MOUTH INTO THE LUNGS TWICE DAILY     * albuterol 2.5 mg /3 mL (0.083 %) nebulizer solution  Commonly known as:  PROVENTIL  Take 2.5 mg by nebulization every 6 (six) hours as needed for Wheezing. Rescue     * albuterol 90 mcg/actuation inhaler  Commonly known as:  PROAIR HFA  INHALE 2 PUFFS INTO THE LUNGS EVERY 6 (SIX) HOURS AS NEEDED FOR WHEEZING.     allopurinol 100 MG tablet  Commonly known as:  ZYLOPRIM  TAKE 1 TABLET BY MOUTH EVERY DAY     amLODIPine 10 MG tablet  Commonly known as:  NORVASC  TAKE 1 TABLET BY MOUTH EVERY DAY     diclofenac 50 MG EC tablet  Commonly known as:  VOLTAREN  Take 50 mg by mouth once daily.     losartan 100 MG tablet  Commonly known as:  COZAAR  TAKE 1 TABLET BY MOUTH ONCE DAILY     metoprolol tartrate 100 MG tablet  Commonly known as:  LOPRESSOR  Take 0.5 tablets (50 mg total) by mouth 2 (two) times daily.     miscellaneous medical supply Kit  1 Units by Misc.(Non-Drug; Combo Route) route nightly.     nebulizer and compressor Natalya  1 Units by Misc.(Non-Drug; Combo Route) route once daily.     omeprazole 20 MG capsule  Commonly known as:  PRILOSEC  TAKE 1 CAPSULE(20 MG) BY MOUTH TWICE DAILY     OSTEO BI-FLEX (5-LOXIN) ORAL  Take 2 tablets by mouth every evening.     pravastatin 40 MG tablet  Commonly  known as:  PRAVACHOL  TAKE 1 TABLET(40 MG) BY MOUTH EVERY DAY     tamsulosin 0.4 mg Cap  Commonly known as:  FLOMAX  Take 2 capsules (0.8 mg total) by mouth once daily.         * This list has 2 medication(s) that are the same as other medications prescribed for you. Read the directions carefully, and ask your doctor or other care provider to review them with you.                Discharge Procedure Orders  Diet general     Activity as tolerated     Call MD for:  temperature >100.4     Call MD for:  persistent nausea and vomiting     Call MD for:  severe uncontrolled pain     Call MD for:  difficulty breathing, headache or visual disturbances     No dressing needed     Follow-up Information     Follow up with Romina Zapata MD In 2 weeks.    Specialty:  Urology    Why:  For post-op follow up    Contact information:    [address] [place] [id number] [phone]

## 2019-05-14 ENCOUNTER — OFFICE VISIT (OUTPATIENT)
Dept: UROLOGY | Facility: CLINIC | Age: 74
End: 2019-05-14
Payer: MEDICARE

## 2019-05-14 VITALS
DIASTOLIC BLOOD PRESSURE: 80 MMHG | HEIGHT: 69 IN | BODY MASS INDEX: 42.8 KG/M2 | SYSTOLIC BLOOD PRESSURE: 131 MMHG | WEIGHT: 289 LBS

## 2019-05-14 DIAGNOSIS — R33.9 URINARY RETENTION: Primary | ICD-10-CM

## 2019-05-14 DIAGNOSIS — R31.0 GROSS HEMATURIA: ICD-10-CM

## 2019-05-14 DIAGNOSIS — N13.8 BPH WITH OBSTRUCTION/LOWER URINARY TRACT SYMPTOMS: ICD-10-CM

## 2019-05-14 DIAGNOSIS — N40.1 BPH WITH OBSTRUCTION/LOWER URINARY TRACT SYMPTOMS: ICD-10-CM

## 2019-05-14 PROCEDURE — 99214 PR OFFICE/OUTPT VISIT, EST, LEVL IV, 30-39 MIN: ICD-10-PCS | Mod: S$PBB,,, | Performed by: NURSE PRACTITIONER

## 2019-05-14 PROCEDURE — 99999 PR PBB SHADOW E&M-EST. PATIENT-LVL III: ICD-10-PCS | Mod: PBBFAC,,, | Performed by: NURSE PRACTITIONER

## 2019-05-14 PROCEDURE — 99999 PR PBB SHADOW E&M-EST. PATIENT-LVL III: CPT | Mod: PBBFAC,,, | Performed by: NURSE PRACTITIONER

## 2019-05-14 PROCEDURE — 99214 OFFICE O/P EST MOD 30 MIN: CPT | Mod: S$PBB,,, | Performed by: NURSE PRACTITIONER

## 2019-05-14 PROCEDURE — 99213 OFFICE O/P EST LOW 20 MIN: CPT | Mod: PBBFAC | Performed by: NURSE PRACTITIONER

## 2019-05-14 NOTE — PROGRESS NOTES
Subjective:       Patient ID: John E Sandifer is a 73 y.o. male who was last seen in this office 4/18/2019    Chief Complaint:   Chief Complaint   Patient presents with    Follow-up     Patient states he's feeling fine as he can be.. wondering if the catheter comes are not and he wants to know what exactly did the procedure show       Urinary Retention  Patient complains of urinary retention. Onset of retention was several days ago and was sudden in onset. Patient currently does have a urinary catheter in place.  2L of urine were drained when catheter was placed. Prior to this event voiding symptoms consisted of slow stream, intermittency, nocturia x 2. Prior treatments include none. Recent medications that may have affected his voiding include none.  Denies prior occurrences of urinary retention.    He presented to the ER on 3/21/19 with a 2 day history of urinary retention. Jaimes placed in the ER and patient started on Flomax x 10 days.     He had a voiding trial in this office on 3/29/19 that was unsuccessful. Jaimes was replaced. He was seen in this office 3 days ago with c/o decreased UOP via jaimes bag. Jaimes irrigated by myself without difficulty. No complications noted with jaimes catheter at that time.    Repeat voiding trial on 4/5/19 was cautiously successful. He reported bloody stools and noted to be hypotensive during his last office visit. He was later admitted for GI blood, hypotension and JOHNY on 4/5/19. He underwent EGD and colonoscopy during admission--no acute findings. Of note, JOHNY and hypotension improved with hydration    Patient unable to urinate during admission. Subsequently jaimes catheter replaced on 4/9/19.  2.4L of urine drained with jaimes insertion. He is currently taking Flomax BID    Most recent voiding trial on 4/18/19 was unsuccessful. Jaimes replaced. He is now s/p cystoscopy with urodynamics on 4/30/19 with Dr. Zapata    Cysto/UDS 4/30/19:  Findings of the Procedure: Jaimes removed.  Delayed sensation. No IBC/DO. Mildly decreased compliance. Normal coordination. Suspect no bladder contraction though detrusor pressure higher with attempt to void. Unable to void with/without P1 in place. Cystoscopy with large lateral lobes of prostate.     Recommendations: Recommend TURP though may have persistent UR due to atonic bladder.     He is here today for follow up. No new complaints    Hematuria  Patient complains of gross hematuria. Onset of hematuria was several days ago and was sudden in onset. There is not a history of nephrolithiasis. There is not a history of urologic trauma. Other urologic symptoms include slow stream, intermittency, nocturia. Patient admits to history of tobacco use. Patient denies history of Agent Orange exposure, chronic Jaimes catheter,  surgeries, sexually transmitted diseases, trauma and urolithiasis. Prior workup has been none.    Patient presented back to the ER the following day with c/o gross hematuria after having jaimes placed. He denies any difficulty or trauma with jaimes placement.  Jaimes catheter noted to be obstructed with blood clots. Catheter was irrigated and replaced in the ER.  He has not had any further occurrences of gross hematuria since this time    CT urogram 4/2019--no obvious etiology for gross hematuria    Cysto/UDS 4/2019--no lesions or masses    Bladder scan--0 ml (previous visit)    ACTIVE MEDICAL ISSUES:  Patient Active Problem List   Diagnosis    A-fib    HTN (hypertension)    HLD (hyperlipidemia)    NELLY (obstructive sleep apnea)    Obesity    Essential hypertension, benign    Obstructive chronic bronchitis with exacerbation    Anticoagulated on Coumadin    COPD (chronic obstructive pulmonary disease)    Knee pain    OA (osteoarthritis) of knee    Chronic bronchitis    Body mass index 40.0-44.9, adult    Abnormal liver ultrasound    Chronic sinusitis    Epistaxis, recurrent    Encounter for current long-term use of anticoagulants     Allergic rhinitis    Conjunctivitis of left eye    SOB (shortness of breath)    Essential hypertension    Chronic a-fib    Pre-op evaluation    Deviated nasal septum    HTN, goal below 140/90    Left hip pain    Acute pain    Right leg pain    Gouty arthritis of right great toe    Acute gout of right ankle    Urinary retention    Gross hematuria    BPH with obstruction/lower urinary tract symptoms    Gastrointestinal hemorrhage    Acute blood loss anemia    JOHNY (acute kidney injury)    Hypotension    Paroxysmal atrial fibrillation       ALLERGIES AND MEDICATIONS: updated and reviewed.  Review of patient's allergies indicates:   Allergen Reactions    Bee sting [allergen ext-venom-honey bee] Swelling    Culver clement (solidago canadensis) Swelling    Kenalog [triamcinolone acetonide]     Lidocaine      Pt doesn't recall the reaction to lidocaine     Current Outpatient Medications   Medication Sig    ADVAIR DISKUS 250-50 mcg/dose diskus inhaler INHALE 1 PUFF BY MOUTH INTO THE LUNGS TWICE DAILY    albuterol (PROAIR HFA) 90 mcg/actuation inhaler INHALE 2 PUFFS INTO THE LUNGS EVERY 6 (SIX) HOURS AS NEEDED FOR WHEEZING.    albuterol (PROVENTIL) 2.5 mg /3 mL (0.083 %) nebulizer solution Take 2.5 mg by nebulization every 6 (six) hours as needed for Wheezing. Rescue    allopurinol (ZYLOPRIM) 100 MG tablet TAKE 1 TABLET BY MOUTH EVERY DAY    amLODIPine (NORVASC) 10 MG tablet TAKE 1 TABLET BY MOUTH EVERY DAY    diclofenac (VOLTAREN) 50 MG EC tablet Take 50 mg by mouth once daily.    GLUCOSAMINE/D3/BOSWELLIA TOO (OSTEO BI-FLEX, 5-LOXIN, ORAL) Take 2 tablets by mouth every evening.    losartan (COZAAR) 100 MG tablet TAKE 1 TABLET BY MOUTH ONCE DAILY    metoprolol tartrate (LOPRESSOR) 100 MG tablet Take 0.5 tablets (50 mg total) by mouth 2 (two) times daily.    miscellaneous medical supply Kit 1 Units by Misc.(Non-Drug; Combo Route) route nightly.    nebulizer and compressor Natalya 1 Units by  "Misc.(Non-Drug; Combo Route) route once daily.    omeprazole (PRILOSEC) 20 MG capsule TAKE 1 CAPSULE(20 MG) BY MOUTH TWICE DAILY    pravastatin (PRAVACHOL) 40 MG tablet TAKE 1 TABLET(40 MG) BY MOUTH EVERY DAY    tamsulosin (FLOMAX) 0.4 mg Cap Take 2 capsules (0.8 mg total) by mouth once daily.     No current facility-administered medications for this visit.        Review of Systems   Constitutional: Negative for activity change, chills, fatigue, fever and unexpected weight change.   Eyes: Negative for discharge, redness and visual disturbance.   Respiratory: Negative for cough, shortness of breath and wheezing.    Cardiovascular: Negative for chest pain and leg swelling.   Gastrointestinal: Negative for abdominal distention, abdominal pain, constipation, diarrhea, nausea and vomiting.   Genitourinary: Negative for decreased urine volume, discharge, dysuria, flank pain, frequency, hematuria, scrotal swelling, testicular pain and urgency.   Musculoskeletal: Negative for arthralgias, joint swelling and myalgias.   Skin: Negative for color change and rash.   Neurological: Negative for dizziness and light-headedness.   Psychiatric/Behavioral: Negative for behavioral problems and confusion. The patient is not nervous/anxious.        Objective:      Vitals:    05/14/19 0846   BP: 131/80   Weight: 131.1 kg (289 lb)   Height: 5' 9" (1.753 m)     Physical Exam   Constitutional: He is oriented to person, place, and time. He appears well-developed.   HENT:   Head: Normocephalic and atraumatic.   Nose: Nose normal.   Eyes: Conjunctivae are normal. Right eye exhibits no discharge. Left eye exhibits no discharge.   Neck: Normal range of motion. Neck supple. No tracheal deviation present. No thyromegaly present.   Cardiovascular: Normal rate and regular rhythm.    Pulmonary/Chest: Effort normal. No respiratory distress. He has no wheezes.   Abdominal: Soft. He exhibits no distension. There is no hepatosplenomegaly. There is no " tenderness. There is no CVA tenderness. No hernia.   Genitourinary:   Genitourinary Comments: Jaimes draining yellow urine   Musculoskeletal: Normal range of motion. He exhibits no edema.   Neurological: He is alert and oriented to person, place, and time.   Skin: Skin is warm and dry. No rash noted. No erythema.     Psychiatric: He has a normal mood and affect. His behavior is normal. Judgment normal.       Urine dipstick shows not done--jaimes in place    Assessment:       1. Urinary retention    2. Gross hematuria    3. BPH with obstruction/lower urinary tract symptoms          Plan:       1. Urinary retention  -Multiple unsuccessful voiding trials in the past requiring jaimes replacement  -Currently taking Flomax BID  -s/p cysto/UDS on 4/30/19--atonic bladder, large lateral lobes of prostate. Recommend TURP  -Plan for TURP on 6/7/19 with Dr. Zapata    2. Gross hematuria  - Discussed etiology and workup of hematuria  -+ history of tobacco use  -CT uro--no obvious etiology of hematuria  -Cystoscopy/UDS--no tumors or lesions    3. BPH with obstruction/lower urinary tract symptoms  -Plan for TURP on 6/7/19              Follow up in about 2 weeks (around 5/28/2019) for jaimes change and urine culture.

## 2019-05-28 ENCOUNTER — OFFICE VISIT (OUTPATIENT)
Dept: UROLOGY | Facility: CLINIC | Age: 74
End: 2019-05-28
Payer: MEDICARE

## 2019-05-28 VITALS
BODY MASS INDEX: 42.06 KG/M2 | HEIGHT: 69 IN | DIASTOLIC BLOOD PRESSURE: 70 MMHG | WEIGHT: 284 LBS | SYSTOLIC BLOOD PRESSURE: 110 MMHG

## 2019-05-28 DIAGNOSIS — R33.9 URINARY RETENTION: Primary | ICD-10-CM

## 2019-05-28 DIAGNOSIS — R31.0 GROSS HEMATURIA: ICD-10-CM

## 2019-05-28 DIAGNOSIS — N40.1 BPH WITH OBSTRUCTION/LOWER URINARY TRACT SYMPTOMS: ICD-10-CM

## 2019-05-28 DIAGNOSIS — N13.8 BPH WITH OBSTRUCTION/LOWER URINARY TRACT SYMPTOMS: ICD-10-CM

## 2019-05-28 PROCEDURE — 99213 OFFICE O/P EST LOW 20 MIN: CPT | Mod: PBBFAC | Performed by: NURSE PRACTITIONER

## 2019-05-28 PROCEDURE — 99214 PR OFFICE/OUTPT VISIT, EST, LEVL IV, 30-39 MIN: ICD-10-PCS | Mod: S$PBB,25,, | Performed by: NURSE PRACTITIONER

## 2019-05-28 PROCEDURE — 87088 URINE BACTERIA CULTURE: CPT

## 2019-05-28 PROCEDURE — 51702 INSERT TEMP BLADDER CATH: CPT | Mod: PBBFAC | Performed by: NURSE PRACTITIONER

## 2019-05-28 PROCEDURE — 99999 PR PBB SHADOW E&M-EST. PATIENT-LVL III: ICD-10-PCS | Mod: PBBFAC,,, | Performed by: NURSE PRACTITIONER

## 2019-05-28 PROCEDURE — 99999 PR PBB SHADOW E&M-EST. PATIENT-LVL III: CPT | Mod: PBBFAC,,, | Performed by: NURSE PRACTITIONER

## 2019-05-28 PROCEDURE — 51702 INSERT TEMP BLADDER CATH: CPT | Mod: S$PBB,,, | Performed by: NURSE PRACTITIONER

## 2019-05-28 PROCEDURE — 87086 URINE CULTURE/COLONY COUNT: CPT

## 2019-05-28 PROCEDURE — 99214 OFFICE O/P EST MOD 30 MIN: CPT | Mod: S$PBB,25,, | Performed by: NURSE PRACTITIONER

## 2019-05-28 PROCEDURE — 51702 PR INSERTION OF TEMPORARY INDWELLING BLADDER CATHETER, SIMPLE: ICD-10-PCS | Mod: S$PBB,,, | Performed by: NURSE PRACTITIONER

## 2019-05-28 NOTE — PROGRESS NOTES
Subjective:       Patient ID: John E Sandifer is a 73 y.o. male who was last seen in this office 5/14/2019    Chief Complaint:   Chief Complaint   Patient presents with    Other     Patient is here for catheter change.. states he was wondering if there is a procedure to stretch the urethra to help with his urinary problems .. I ledt him know to speak with the provider in reference to that.. no other issues stated       Urinary Retention  Patient complains of urinary retention. Onset of retention was several days ago and was sudden in onset. Patient currently does have a urinary catheter in place.  2L of urine were drained when catheter was placed. Prior to this event voiding symptoms consisted of slow stream, intermittency, nocturia x 2. Prior treatments include none. Recent medications that may have affected his voiding include none.  Denies prior occurrences of urinary retention.    He presented to the ER on 3/21/19 with a 2 day history of urinary retention. Jaimes placed in the ER and patient started on Flomax x 10 days.     He had a voiding trial in this office on 3/29/19 that was unsuccessful. Jaimes was replaced. He was seen in this office 3 days ago with c/o decreased UOP via jaimes bag. Jaimes irrigated by myself without difficulty. No complications noted with jaimes catheter at that time.    Repeat voiding trial on 4/5/19 was cautiously successful. He reported bloody stools and noted to be hypotensive during his last office visit. He was later admitted for GI blood, hypotension and JOHNY on 4/5/19. He underwent EGD and colonoscopy during admission--no acute findings. Of note, JOHNY and hypotension improved with hydration    Patient unable to urinate during admission. Subsequently jaimes catheter replaced on 4/9/19.  2.4L of urine drained with jaimes insertion. He is currently taking Flomax BID    Most recent voiding trial on 4/18/19 was unsuccessful. Jaimes replaced. He is now s/p cystoscopy with urodynamics on  4/30/19 with Dr. Zapata    Cysto/UDS 4/30/19:  Findings of the Procedure: Jaimes removed. Delayed sensation. No IBC/DO. Mildly decreased compliance. Normal coordination. Suspect no bladder contraction though detrusor pressure higher with attempt to void. Unable to void with/without P1 in place. Cystoscopy with large lateral lobes of prostate.     Recommendations: Recommend TURP though may have persistent UR due to atonic bladder.     He is scheduled for a TURP on 6/7/19 with Dr. Zapata. He is here today for a jaimes exchange. No new complaints    Hematuria  Patient complains of gross hematuria. Onset of hematuria was several days ago and was sudden in onset. There is not a history of nephrolithiasis. There is not a history of urologic trauma. Other urologic symptoms include slow stream, intermittency, nocturia. Patient admits to history of tobacco use. Patient denies history of Agent Orange exposure, chronic Jaimes catheter,  surgeries, sexually transmitted diseases, trauma and urolithiasis. Prior workup has been none.    Patient presented back to the ER the following day with c/o gross hematuria after having jaimes placed. He denies any difficulty or trauma with jaimes placement.  Jaimes catheter noted to be obstructed with blood clots. Catheter was irrigated and replaced in the ER.  He has not had any further occurrences of gross hematuria since this time    CT urogram 4/2019--no obvious etiology for gross hematuria    Cysto/UDS 4/2019--no lesions or masses    Bladder scan--0 ml (previous visit)    ACTIVE MEDICAL ISSUES:  Patient Active Problem List   Diagnosis    A-fib    HTN (hypertension)    HLD (hyperlipidemia)    NELLY (obstructive sleep apnea)    Obesity    Essential hypertension, benign    Obstructive chronic bronchitis with exacerbation    Anticoagulated on Coumadin    COPD (chronic obstructive pulmonary disease)    Knee pain    OA (osteoarthritis) of knee    Chronic bronchitis    Body mass index  40.0-44.9, adult    Abnormal liver ultrasound    Chronic sinusitis    Epistaxis, recurrent    Encounter for current long-term use of anticoagulants    Allergic rhinitis    Conjunctivitis of left eye    SOB (shortness of breath)    Essential hypertension    Chronic a-fib    Pre-op evaluation    Deviated nasal septum    HTN, goal below 140/90    Left hip pain    Acute pain    Right leg pain    Gouty arthritis of right great toe    Acute gout of right ankle    Urinary retention    Gross hematuria    BPH with obstruction/lower urinary tract symptoms    Gastrointestinal hemorrhage    Acute blood loss anemia    JOHNY (acute kidney injury)    Hypotension    Paroxysmal atrial fibrillation       ALLERGIES AND MEDICATIONS: updated and reviewed.  Review of patient's allergies indicates:   Allergen Reactions    Bee sting [allergen ext-venom-honey bee] Swelling    Culver clement (solidago canadensis) Swelling    Kenalog [triamcinolone acetonide]     Lidocaine      Pt doesn't recall the reaction to lidocaine     Current Outpatient Medications   Medication Sig    ADVAIR DISKUS 250-50 mcg/dose diskus inhaler INHALE 1 PUFF BY MOUTH INTO THE LUNGS TWICE DAILY    albuterol (PROAIR HFA) 90 mcg/actuation inhaler INHALE 2 PUFFS INTO THE LUNGS EVERY 6 (SIX) HOURS AS NEEDED FOR WHEEZING.    albuterol (PROVENTIL) 2.5 mg /3 mL (0.083 %) nebulizer solution Take 2.5 mg by nebulization every 6 (six) hours as needed for Wheezing. Rescue    allopurinol (ZYLOPRIM) 100 MG tablet TAKE 1 TABLET BY MOUTH EVERY DAY    amLODIPine (NORVASC) 10 MG tablet TAKE 1 TABLET BY MOUTH EVERY DAY    diclofenac (VOLTAREN) 50 MG EC tablet Take 50 mg by mouth once daily.    GLUCOSAMINE/D3/BOSWELLIA TOO (OSTEO BI-FLEX, 5-LOXIN, ORAL) Take 2 tablets by mouth every evening.    losartan (COZAAR) 100 MG tablet TAKE 1 TABLET BY MOUTH ONCE DAILY    metoprolol tartrate (LOPRESSOR) 100 MG tablet Take 0.5 tablets (50 mg total) by mouth 2 (two)  "times daily.    [employer] Mediacellaneous medical supply Kit 1 Units by Misc.(Non-Drug; Combo Route) route nightly.    nebulizer and compressor Natalya 1 Units by Misc.(Non-Drug; Combo Route) route once daily.    omeprazole (PRILOSEC) 20 MG capsule TAKE 1 CAPSULE(20 MG) BY MOUTH TWICE DAILY    pravastatin (PRAVACHOL) 40 MG tablet TAKE 1 TABLET(40 MG) BY MOUTH EVERY DAY    tamsulosin (FLOMAX) 0.4 mg Cap Take 2 capsules (0.8 mg total) by mouth once daily.     No current facility-administered medications for this visit.        Review of Systems   Constitutional: Negative for activity change, chills, fatigue, fever and unexpected weight change.   Eyes: Negative for discharge, redness and visual disturbance.   Respiratory: Negative for cough, shortness of breath and wheezing.    Cardiovascular: Negative for chest pain and leg swelling.   Gastrointestinal: Negative for abdominal distention, abdominal pain, constipation, diarrhea, nausea and vomiting.   Genitourinary: Negative for decreased urine volume, discharge, dysuria, flank pain, frequency, hematuria, scrotal swelling, testicular pain and urgency.   Musculoskeletal: Negative for arthralgias, joint swelling and myalgias.   Skin: Negative for color change and rash.   Neurological: Negative for dizziness and light-headedness.   Psychiatric/Behavioral: Negative for behavioral problems and confusion. The patient is not nervous/anxious.        Objective:      Vitals:    05/28/19 0843   BP: 110/70   Weight: 128.8 kg (284 lb)   Height: 5' 9" (1.753 m)     Physical Exam   Constitutional: He is oriented to person, place, and time. He appears well-developed.   HENT:   Head: Normocephalic and atraumatic.   Nose: Nose normal.   Eyes: Conjunctivae are normal. Right eye exhibits no discharge. Left eye exhibits no discharge.   Neck: Normal range of motion. Neck supple. No tracheal deviation present. No thyromegaly present.   Cardiovascular: Normal rate and regular rhythm.    Pulmonary/Chest: " Effort normal. No respiratory distress. He has no wheezes.   Abdominal: Soft. He exhibits no distension. There is no hepatosplenomegaly. There is no tenderness. There is no CVA tenderness. No hernia.   Genitourinary:   Genitourinary Comments: Jaimes draining yellow urine   Musculoskeletal: Normal range of motion. He exhibits no edema.   Neurological: He is alert and oriented to person, place, and time.   Skin: Skin is warm and dry. No rash noted. No erythema.     Psychiatric: He has a normal mood and affect. His behavior is normal. Judgment normal.       Urine dipstick shows not done--jaimes in place    18 Fr jaimes inserted by nurse using sterile technique under my supervision. Patient tolerated procedure well without any immediate complications    Assessment:       1. Urinary retention    2. Gross hematuria    3. BPH with obstruction/lower urinary tract symptoms          Plan:       1. Urinary retention  -Multiple unsuccessful voiding trials in the past requiring jaimes replacement  -Currently taking Flomax BID  -s/p cysto/UDS on 4/30/19--atonic bladder, large lateral lobes of prostate. Recommend TURP  -Plan for TURP on 6/7/19 with Dr. Zapata  - Insert jaimes catheter  - Urine culture    2. Gross hematuria  - Discussed etiology and workup of hematuria  -+ history of tobacco use  -CT uro--no obvious etiology of hematuria  -Cystoscopy/UDS--no tumors or lesions    3. BPH with obstruction/lower urinary tract symptoms  -Plan for TURP on 6/7/19              Follow up for 3 days post op for jaimes removal.

## 2019-05-30 LAB — BACTERIA UR CULT: NORMAL

## 2019-05-31 ENCOUNTER — HOSPITAL ENCOUNTER (OUTPATIENT)
Dept: PREADMISSION TESTING | Facility: HOSPITAL | Age: 74
Discharge: HOME | End: 2019-05-31
Attending: UROLOGY
Payer: MEDICARE

## 2019-05-31 VITALS
HEIGHT: 69 IN | SYSTOLIC BLOOD PRESSURE: 117 MMHG | BODY MASS INDEX: 41.8 KG/M2 | WEIGHT: 282.19 LBS | TEMPERATURE: 98 F | DIASTOLIC BLOOD PRESSURE: 71 MMHG | OXYGEN SATURATION: 95 % | RESPIRATION RATE: 18 BRPM | HEART RATE: 92 BPM

## 2019-05-31 DIAGNOSIS — Z01.818 PREOP TESTING: Primary | ICD-10-CM

## 2019-05-31 LAB
ANION GAP SERPL CALC-SCNC: 8 MMOL/L (ref 8–16)
BASOPHILS # BLD AUTO: 0.04 K/UL (ref 0–0.2)
BASOPHILS NFR BLD: 0.4 % (ref 0–1.9)
BUN SERPL-MCNC: 14 MG/DL (ref 8–23)
CALCIUM SERPL-MCNC: 10.2 MG/DL (ref 8.7–10.5)
CHLORIDE SERPL-SCNC: 102 MMOL/L (ref 95–110)
CO2 SERPL-SCNC: 27 MMOL/L (ref 23–29)
CREAT SERPL-MCNC: 1.1 MG/DL (ref 0.5–1.4)
DIFFERENTIAL METHOD: ABNORMAL
EOSINOPHIL # BLD AUTO: 0.4 K/UL (ref 0–0.5)
EOSINOPHIL NFR BLD: 4.3 % (ref 0–8)
ERYTHROCYTE [DISTWIDTH] IN BLOOD BY AUTOMATED COUNT: 15.1 % (ref 11.5–14.5)
EST. GFR  (AFRICAN AMERICAN): >60 ML/MIN/1.73 M^2
EST. GFR  (NON AFRICAN AMERICAN): >60 ML/MIN/1.73 M^2
GIANT PLATELETS BLD QL SMEAR: PRESENT
GLUCOSE SERPL-MCNC: 114 MG/DL (ref 70–110)
HCT VFR BLD AUTO: 40.1 % (ref 40–54)
HGB BLD-MCNC: 12.5 G/DL (ref 14–18)
LYMPHOCYTES # BLD AUTO: 1.1 K/UL (ref 1–4.8)
LYMPHOCYTES NFR BLD: 12.3 % (ref 18–48)
MCH RBC QN AUTO: 26.5 PG (ref 27–31)
MCHC RBC AUTO-ENTMCNC: 31.2 G/DL (ref 32–36)
MCV RBC AUTO: 85 FL (ref 82–98)
MONOCYTES # BLD AUTO: 0.9 K/UL (ref 0.3–1)
MONOCYTES NFR BLD: 10 % (ref 4–15)
NEUTROPHILS # BLD AUTO: 6.7 K/UL (ref 1.8–7.7)
NEUTROPHILS NFR BLD: 73.2 % (ref 38–73)
PLATELET # BLD AUTO: 186 K/UL (ref 150–350)
PLATELET BLD QL SMEAR: ABNORMAL
PMV BLD AUTO: 12.1 FL (ref 9.2–12.9)
POTASSIUM SERPL-SCNC: 4.4 MMOL/L (ref 3.5–5.1)
RBC # BLD AUTO: 4.72 M/UL (ref 4.6–6.2)
SODIUM SERPL-SCNC: 137 MMOL/L (ref 136–145)
WBC # BLD AUTO: 9.22 K/UL (ref 3.9–12.7)

## 2019-05-31 PROCEDURE — 80048 BASIC METABOLIC PNL TOTAL CA: CPT

## 2019-05-31 PROCEDURE — 36415 COLL VENOUS BLD VENIPUNCTURE: CPT

## 2019-05-31 PROCEDURE — 85025 COMPLETE CBC W/AUTO DIFF WBC: CPT

## 2019-05-31 NOTE — DISCHARGE INSTRUCTIONS
"Your procedure  is scheduled for _6/7/2019_________.    Call [phone] between 2pm and 5pm on _6/6/2019______to find out your arrival time for the day of surgery.    Report to Same Day Surgery Unit at ____ AM on the 2nd floor of the hospital.  Use the front entrance of the hospital.  The front doors of the hospital open promptly at 5:30am.  If you need wheelchair assistance, call [phone] from your cell phone, or call "0" from the courtesy phone in the lobby.    Important instructions:   Do not eat or drink after 12 midnight, including water.  It is okay to brush your teeth.  Do not have gum, candy or mints.     Take only these medications with a small swallow of water on the morning of your surgery __inhalers, amlodipine, metoprolol____________    Do not take any diabetic medication on the morning of surgery unless instructed to do so by your doctor or pre op nurse.    Stop taking Aspirin, Ibuprofen, Motrin and Aleve , Fish oil, and Vitamin E for at least 7 days before your surgery. You may use Tylenol unless otherwise instructed by your doctor.         Please shower the night before and the morning of your surgery.         You may wear deodorant only.      Do not wear powder, body lotion or perfume/cologne.     Do not wear any jewelry or have any metal on your body.     You will be asked to remove any dentures or partials for the procedure.     Please bring any documents given to you by your doctor.     If you are going home on the same day of surgery, you must arrange for a family member or a friend to drive you home.  Public transportation is prohibited.  You will not be able to drive home if you were given anesthesia or sedation.     Wear loose fitting clothes allowing for bandages.     Please leave money and valuables home.       You may bring your cell phone.     Call the doctor if fever or illness should occur before your surgery.    Call [phone] to contact us here if needed.  "

## 2019-06-03 ENCOUNTER — TELEPHONE (OUTPATIENT)
Dept: UROLOGY | Facility: CLINIC | Age: 74
End: 2019-06-03

## 2019-06-03 RX ORDER — CEPHALEXIN 500 MG/1
500 CAPSULE ORAL 2 TIMES DAILY
Qty: 14 CAPSULE | Refills: 0 | Status: SHIPPED | OUTPATIENT
Start: 2019-06-03 | End: 2019-06-10

## 2019-06-03 NOTE — TELEPHONE ENCOUNTER
Mild bacteria noted on recent UCx. Will treat with antibiotics due to planned procedure. Keflex sent to patient's pharmacy

## 2019-06-06 ENCOUNTER — OFFICE VISIT (OUTPATIENT)
Dept: CARDIOLOGY | Facility: CLINIC | Age: 74
End: 2019-06-06
Payer: MEDICARE

## 2019-06-06 ENCOUNTER — ANESTHESIA EVENT (OUTPATIENT)
Dept: SURGERY | Facility: HOSPITAL | Age: 74
End: 2019-06-06
Payer: MEDICARE

## 2019-06-06 VITALS
HEART RATE: 67 BPM | BODY MASS INDEX: 42.15 KG/M2 | RESPIRATION RATE: 16 BRPM | DIASTOLIC BLOOD PRESSURE: 82 MMHG | SYSTOLIC BLOOD PRESSURE: 142 MMHG | OXYGEN SATURATION: 97 % | WEIGHT: 281.31 LBS

## 2019-06-06 DIAGNOSIS — I10 HYPERTENSION, UNSPECIFIED TYPE: ICD-10-CM

## 2019-06-06 DIAGNOSIS — I48.20 CHRONIC ATRIAL FIBRILLATION: ICD-10-CM

## 2019-06-06 DIAGNOSIS — N40.0 BENIGN PROSTATIC HYPERPLASIA, UNSPECIFIED WHETHER LOWER URINARY TRACT SYMPTOMS PRESENT: ICD-10-CM

## 2019-06-06 DIAGNOSIS — I48.91 A-FIB: ICD-10-CM

## 2019-06-06 DIAGNOSIS — E66.01 CLASS 3 SEVERE OBESITY DUE TO EXCESS CALORIES WITH SERIOUS COMORBIDITY IN ADULT, UNSPECIFIED BMI: ICD-10-CM

## 2019-06-06 DIAGNOSIS — G47.33 OSA (OBSTRUCTIVE SLEEP APNEA): ICD-10-CM

## 2019-06-06 DIAGNOSIS — J41.0 SIMPLE CHRONIC BRONCHITIS: ICD-10-CM

## 2019-06-06 DIAGNOSIS — N28.9 RENAL INSUFFICIENCY: ICD-10-CM

## 2019-06-06 DIAGNOSIS — R06.02 SHORTNESS OF BREATH: Primary | ICD-10-CM

## 2019-06-06 DIAGNOSIS — E78.2 MIXED HYPERLIPIDEMIA: ICD-10-CM

## 2019-06-06 PROCEDURE — 99213 OFFICE O/P EST LOW 20 MIN: CPT | Mod: PBBFAC,PO | Performed by: INTERNAL MEDICINE

## 2019-06-06 PROCEDURE — 99999 PR PBB SHADOW E&M-EST. PATIENT-LVL III: CPT | Mod: PBBFAC,,, | Performed by: INTERNAL MEDICINE

## 2019-06-06 PROCEDURE — 99214 OFFICE O/P EST MOD 30 MIN: CPT | Mod: S$PBB,,, | Performed by: INTERNAL MEDICINE

## 2019-06-06 PROCEDURE — 93010 ELECTROCARDIOGRAM REPORT: CPT | Mod: S$PBB,,, | Performed by: INTERNAL MEDICINE

## 2019-06-06 PROCEDURE — 99999 PR PBB SHADOW E&M-EST. PATIENT-LVL III: ICD-10-PCS | Mod: PBBFAC,,, | Performed by: INTERNAL MEDICINE

## 2019-06-06 PROCEDURE — 93010 EKG 12-LEAD: ICD-10-PCS | Mod: S$PBB,,, | Performed by: INTERNAL MEDICINE

## 2019-06-06 PROCEDURE — 93005 ELECTROCARDIOGRAM TRACING: CPT | Mod: PBBFAC,PO | Performed by: INTERNAL MEDICINE

## 2019-06-06 PROCEDURE — 99214 PR OFFICE/OUTPT VISIT, EST, LEVL IV, 30-39 MIN: ICD-10-PCS | Mod: S$PBB,,, | Performed by: INTERNAL MEDICINE

## 2019-06-06 NOTE — LETTER
June 6, 2019    John E Sandifer  [address] [place] [address]             [facility]  [address]  [place] [address]  Phone: [phone]   John E Sandifer was seen by me on 6/6/2019 and is cleared for Urology procedure with low to intermidiate risk.     If you have any questions or concerns, please don't hesitate to call.    Sincerely,      Chico Gallo MD

## 2019-06-06 NOTE — PROGRESS NOTES
Subjective:    Patient ID:  John E Sandifer is a 73 y.o. male who presents for follow-up of Pre-op Exam (clearance for prostate shave )      HPI  Patient is here for follow-up of chronic atrial fibrillation.  He was remotely seen by Kurt 2013 did not follow-up at that time.  He was recently seen by me in the hospital in April when he was admitted for GI bleed.  He had endoscopy at that time is not taking any oral anticoagulants.  Even off of blood thinners he had issues with bleeding and has been a prohibitive risk.  He was not even cleared for aspirin at that time.  He gets shortness of breath with heavy activity but is unable to do any exercise due to knee pain.  He is wearing a Fatima currently.  He is scheduled for prostate shaving tomorrow.  We discussed that this is a lower risk procedure.  Despite his low functional exercise tolerance he has not had any cardiopulmonary complaints otherwise.  He has continued his beta-blocker which he needs to do perioperatively.  He denies any PND, orthopnea or lower extremity edema.  He denies any dizziness, presyncope or syncope.  He wears CPAP at night and with naps.    Review of Systems   Constitution: Negative.   HENT: Negative.    Eyes: Negative.    Cardiovascular: Positive for dyspnea on exertion. Negative for chest pain, irregular heartbeat, leg swelling, near-syncope, orthopnea, palpitations, paroxysmal nocturnal dyspnea and syncope.   Respiratory: Negative for shortness of breath.    Skin: Negative.    Musculoskeletal: Negative.    Gastrointestinal: Negative for abdominal pain, constipation and diarrhea.   Genitourinary: Negative for dysuria.   Neurological: Negative for dizziness.   Psychiatric/Behavioral: Negative.         Objective:    Physical Exam   Constitutional: He is oriented to person, place, and time. He appears well-developed and well-nourished. No distress.   HENT:   Head: Normocephalic and atraumatic.   Eyes: Pupils are equal, round, and reactive to  light. Conjunctivae and EOM are normal.   Neck: Normal range of motion. Neck supple. No thyromegaly present.   Cardiovascular: Normal rate, regular rhythm and normal heart sounds.   No murmur heard.  Pulmonary/Chest: Effort normal and breath sounds normal. No respiratory distress. He has no wheezes. He has no rales. He exhibits no tenderness.   Abdominal: Soft. Bowel sounds are normal.   Musculoskeletal: He exhibits no edema.   Neurological: He is alert and oriented to person, place, and time.   Skin: Skin is warm and dry.   Psychiatric: He has a normal mood and affect. His behavior is normal.         ECHO:  4-19  · TDS  · Normal left ventricular systolic function. The estimated ejection fraction is 55%  · Concentric left ventricular remodeling.  · Severe left atrial enlargement.    EKG today shows atrial fibrillation with rate controlled     Assessment:       1. Shortness of breath    2. Chronic atrial fibrillation    3. Hypertension, unspecified type    4. Renal insufficiency    5. Benign prostatic hyperplasia, unspecified whether lower urinary tract symptoms present    6. Mixed hyperlipidemia    7. NELLY (obstructive sleep apnea)    8. Simple chronic bronchitis    9. Class 3 severe obesity due to excess calories with serious comorbidity in adult, unspecified BMI         Plan:       -patient is cleared at intermediate risk for low risk urological procedure (* he understands and accepts the risk)  -poor candidate for OAC secondary to the previous bleed history, eventually will consider low-dose aspirin when stable  -plan for baseline ischemic workup with previous risk factors and symptoms  -wear CPAP  -encouraged diet and exercise tolerated    Return to clinic in 1 month with testing prior to next visit in 1 month when stable postoperative

## 2019-06-07 ENCOUNTER — HOSPITAL ENCOUNTER (OUTPATIENT)
Facility: HOSPITAL | Age: 74
Discharge: HOME | End: 2019-06-07
Attending: UROLOGY | Admitting: UROLOGY
Payer: MEDICARE

## 2019-06-07 ENCOUNTER — ANESTHESIA (OUTPATIENT)
Dept: SURGERY | Facility: HOSPITAL | Age: 74
End: 2019-06-07
Payer: MEDICARE

## 2019-06-07 VITALS
WEIGHT: 281 LBS | BODY MASS INDEX: 41.62 KG/M2 | TEMPERATURE: 98 F | DIASTOLIC BLOOD PRESSURE: 70 MMHG | HEIGHT: 69 IN | OXYGEN SATURATION: 97 % | RESPIRATION RATE: 16 BRPM | HEART RATE: 75 BPM | SYSTOLIC BLOOD PRESSURE: 156 MMHG

## 2019-06-07 DIAGNOSIS — N13.8 BPH WITH OBSTRUCTION/LOWER URINARY TRACT SYMPTOMS: ICD-10-CM

## 2019-06-07 DIAGNOSIS — R33.9 URINARY RETENTION: Primary | ICD-10-CM

## 2019-06-07 DIAGNOSIS — N40.1 BPH WITH OBSTRUCTION/LOWER URINARY TRACT SYMPTOMS: ICD-10-CM

## 2019-06-07 PROCEDURE — C1769 GUIDE WIRE: HCPCS | Performed by: UROLOGY

## 2019-06-07 PROCEDURE — 88305 TISSUE EXAM BY PATHOLOGIST: CPT | Mod: 26,,, | Performed by: PATHOLOGY

## 2019-06-07 PROCEDURE — 25000003 PHARM REV CODE 250: Performed by: REGISTERED NURSE

## 2019-06-07 PROCEDURE — D9220A PRA ANESTHESIA: ICD-10-PCS | Mod: CRNA,,, | Performed by: NURSE ANESTHETIST, CERTIFIED REGISTERED

## 2019-06-07 PROCEDURE — 63600175 PHARM REV CODE 636 W HCPCS: Performed by: REGISTERED NURSE

## 2019-06-07 PROCEDURE — D9220A PRA ANESTHESIA: Mod: ANES,,, | Performed by: ANESTHESIOLOGY

## 2019-06-07 PROCEDURE — 71000039 HC RECOVERY, EACH ADD'L HOUR: Performed by: UROLOGY

## 2019-06-07 PROCEDURE — D9220A PRA ANESTHESIA: ICD-10-PCS | Mod: ANES,,, | Performed by: ANESTHESIOLOGY

## 2019-06-07 PROCEDURE — 37000008 HC ANESTHESIA 1ST 15 MINUTES: Performed by: UROLOGY

## 2019-06-07 PROCEDURE — 25000003 PHARM REV CODE 250: Performed by: NURSE ANESTHETIST, CERTIFIED REGISTERED

## 2019-06-07 PROCEDURE — 71000033 HC RECOVERY, INTIAL HOUR: Performed by: UROLOGY

## 2019-06-07 PROCEDURE — 27201423 OPTIME MED/SURG SUP & DEVICES STERILE SUPPLY: Performed by: UROLOGY

## 2019-06-07 PROCEDURE — 71000015 HC POSTOP RECOV 1ST HR: Performed by: UROLOGY

## 2019-06-07 PROCEDURE — 36000706: Performed by: UROLOGY

## 2019-06-07 PROCEDURE — 36000707: Performed by: UROLOGY

## 2019-06-07 PROCEDURE — 37000009 HC ANESTHESIA EA ADD 15 MINS: Performed by: UROLOGY

## 2019-06-07 PROCEDURE — 52601 PR TRANSURETHRAL ELEC-SURG PROSTATECTOM: ICD-10-PCS | Mod: ,,, | Performed by: UROLOGY

## 2019-06-07 PROCEDURE — 63600175 PHARM REV CODE 636 W HCPCS: Performed by: NURSE ANESTHETIST, CERTIFIED REGISTERED

## 2019-06-07 PROCEDURE — 25000242 PHARM REV CODE 250 ALT 637 W/ HCPCS: Performed by: NURSE ANESTHETIST, CERTIFIED REGISTERED

## 2019-06-07 PROCEDURE — 25000003 PHARM REV CODE 250: Performed by: ANESTHESIOLOGY

## 2019-06-07 PROCEDURE — 88305 TISSUE EXAM BY PATHOLOGIST: CPT | Performed by: PATHOLOGY

## 2019-06-07 PROCEDURE — D9220A PRA ANESTHESIA: Mod: CRNA,,, | Performed by: NURSE ANESTHETIST, CERTIFIED REGISTERED

## 2019-06-07 PROCEDURE — 52601 PROSTATECTOMY (TURP): CPT | Mod: ,,, | Performed by: UROLOGY

## 2019-06-07 PROCEDURE — 88305 TISSUE SPECIMEN TO PATHOLOGY - SURGERY: ICD-10-PCS | Mod: 26,,, | Performed by: PATHOLOGY

## 2019-06-07 RX ORDER — HYDROCODONE BITARTRATE AND ACETAMINOPHEN 5; 325 MG/1; MG/1
1 TABLET ORAL EVERY 4 HOURS PRN
Status: DISCONTINUED | OUTPATIENT
Start: 2019-06-07 | End: 2019-06-07 | Stop reason: HOSPADM

## 2019-06-07 RX ORDER — CEFAZOLIN SODIUM 2 G/50ML
2 SOLUTION INTRAVENOUS
Status: DISCONTINUED | OUTPATIENT
Start: 2019-06-07 | End: 2019-06-07 | Stop reason: HOSPADM

## 2019-06-07 RX ORDER — CEPHALEXIN 500 MG/1
500 CAPSULE ORAL EVERY 12 HOURS
Qty: 4 CAPSULE | Refills: 0 | Status: SHIPPED | OUTPATIENT
Start: 2019-06-07 | End: 2020-04-09

## 2019-06-07 RX ORDER — HYDROCODONE BITARTRATE AND ACETAMINOPHEN 5; 325 MG/1; MG/1
1 TABLET ORAL EVERY 6 HOURS PRN
Qty: 8 TABLET | Refills: 0 | Status: SHIPPED | OUTPATIENT
Start: 2019-06-07 | End: 2020-04-09

## 2019-06-07 RX ORDER — ACETAMINOPHEN 325 MG/1
650 TABLET ORAL EVERY 4 HOURS PRN
Status: DISCONTINUED | OUTPATIENT
Start: 2019-06-07 | End: 2019-06-07 | Stop reason: HOSPADM

## 2019-06-07 RX ORDER — NEOSTIGMINE METHYLSULFATE 1 MG/ML
INJECTION, SOLUTION INTRAVENOUS
Status: DISCONTINUED | OUTPATIENT
Start: 2019-06-07 | End: 2019-06-07

## 2019-06-07 RX ORDER — FENTANYL CITRATE 50 UG/ML
INJECTION, SOLUTION INTRAMUSCULAR; INTRAVENOUS
Status: DISCONTINUED | OUTPATIENT
Start: 2019-06-07 | End: 2019-06-07

## 2019-06-07 RX ORDER — PHENAZOPYRIDINE HYDROCHLORIDE 100 MG/1
200 TABLET, FILM COATED ORAL 3 TIMES DAILY PRN
Status: DISCONTINUED | OUTPATIENT
Start: 2019-06-07 | End: 2019-06-07 | Stop reason: HOSPADM

## 2019-06-07 RX ORDER — PHENAZOPYRIDINE HYDROCHLORIDE 100 MG/1
200 TABLET, FILM COATED ORAL
Qty: 18 TABLET | Refills: 0 | Status: SHIPPED | OUTPATIENT
Start: 2019-06-07 | End: 2020-04-09

## 2019-06-07 RX ORDER — PROPOFOL 10 MG/ML
VIAL (ML) INTRAVENOUS
Status: DISCONTINUED | OUTPATIENT
Start: 2019-06-07 | End: 2019-06-07

## 2019-06-07 RX ORDER — LIDOCAINE HCL/PF 100 MG/5ML
SYRINGE (ML) INTRAVENOUS
Status: DISCONTINUED | OUTPATIENT
Start: 2019-06-07 | End: 2019-06-07

## 2019-06-07 RX ORDER — ONDANSETRON 2 MG/ML
4 INJECTION INTRAMUSCULAR; INTRAVENOUS EVERY 12 HOURS PRN
Status: DISCONTINUED | OUTPATIENT
Start: 2019-06-07 | End: 2019-06-07 | Stop reason: HOSPADM

## 2019-06-07 RX ORDER — ACETAMINOPHEN 10 MG/ML
INJECTION, SOLUTION INTRAVENOUS
Status: DISCONTINUED | OUTPATIENT
Start: 2019-06-07 | End: 2019-06-07

## 2019-06-07 RX ORDER — HYDROMORPHONE HYDROCHLORIDE 2 MG/ML
0.2 INJECTION, SOLUTION INTRAMUSCULAR; INTRAVENOUS; SUBCUTANEOUS EVERY 5 MIN PRN
Status: DISCONTINUED | OUTPATIENT
Start: 2019-06-07 | End: 2019-06-07 | Stop reason: HOSPADM

## 2019-06-07 RX ORDER — SODIUM CHLORIDE, SODIUM LACTATE, POTASSIUM CHLORIDE, CALCIUM CHLORIDE 600; 310; 30; 20 MG/100ML; MG/100ML; MG/100ML; MG/100ML
INJECTION, SOLUTION INTRAVENOUS CONTINUOUS
Status: DISCONTINUED | OUTPATIENT
Start: 2019-06-07 | End: 2022-12-20

## 2019-06-07 RX ORDER — ONDANSETRON 2 MG/ML
INJECTION INTRAMUSCULAR; INTRAVENOUS
Status: DISCONTINUED | OUTPATIENT
Start: 2019-06-07 | End: 2019-06-07

## 2019-06-07 RX ORDER — PHENYLEPHRINE HYDROCHLORIDE 10 MG/ML
INJECTION INTRAVENOUS
Status: DISCONTINUED | OUTPATIENT
Start: 2019-06-07 | End: 2019-06-07

## 2019-06-07 RX ORDER — SUCCINYLCHOLINE CHLORIDE 20 MG/ML
INJECTION INTRAMUSCULAR; INTRAVENOUS
Status: DISCONTINUED | OUTPATIENT
Start: 2019-06-07 | End: 2019-06-07

## 2019-06-07 RX ORDER — ALBUTEROL SULFATE 90 UG/1
AEROSOL, METERED RESPIRATORY (INHALATION)
Status: DISCONTINUED | OUTPATIENT
Start: 2019-06-07 | End: 2019-06-07

## 2019-06-07 RX ORDER — ROCURONIUM BROMIDE 10 MG/ML
INJECTION, SOLUTION INTRAVENOUS
Status: DISCONTINUED | OUTPATIENT
Start: 2019-06-07 | End: 2019-06-07

## 2019-06-07 RX ORDER — DOCUSATE SODIUM 100 MG/1
100 CAPSULE, LIQUID FILLED ORAL 2 TIMES DAILY
Qty: 30 CAPSULE | Refills: 0 | Status: SHIPPED | OUTPATIENT
Start: 2019-06-07 | End: 2020-04-09

## 2019-06-07 RX ORDER — GLYCOPYRROLATE 0.2 MG/ML
INJECTION INTRAMUSCULAR; INTRAVENOUS
Status: DISCONTINUED | OUTPATIENT
Start: 2019-06-07 | End: 2019-06-07

## 2019-06-07 RX ADMIN — PHENYLEPHRINE HYDROCHLORIDE 200 MCG: 10 INJECTION INTRAVENOUS at 11:06

## 2019-06-07 RX ADMIN — ACETAMINOPHEN 1000 MG: 10 INJECTION, SOLUTION INTRAVENOUS at 01:06

## 2019-06-07 RX ADMIN — GLYCOPYRROLATE 0.4 MG: 0.2 INJECTION, SOLUTION INTRAMUSCULAR; INTRAVENOUS at 01:06

## 2019-06-07 RX ADMIN — PROPOFOL 150 MG: 10 INJECTION, EMULSION INTRAVENOUS at 11:06

## 2019-06-07 RX ADMIN — SUCCINYLCHOLINE CHLORIDE 140 MG: 20 INJECTION, SOLUTION INTRAMUSCULAR; INTRAVENOUS at 11:06

## 2019-06-07 RX ADMIN — PHENYLEPHRINE HYDROCHLORIDE 200 MCG: 10 INJECTION INTRAVENOUS at 01:06

## 2019-06-07 RX ADMIN — ALBUTEROL SULFATE 3 PUFF: 90 AEROSOL, METERED RESPIRATORY (INHALATION) at 11:06

## 2019-06-07 RX ADMIN — PHENYLEPHRINE HYDROCHLORIDE 100 MCG: 10 INJECTION INTRAVENOUS at 01:06

## 2019-06-07 RX ADMIN — PHENYLEPHRINE HYDROCHLORIDE 100 MCG: 10 INJECTION INTRAVENOUS at 12:06

## 2019-06-07 RX ADMIN — ROCURONIUM BROMIDE 15 MG: 10 INJECTION, SOLUTION INTRAVENOUS at 12:06

## 2019-06-07 RX ADMIN — NEOSTIGMINE METHYLSULFATE 3.5 MG: 1 INJECTION INTRAVENOUS at 01:06

## 2019-06-07 RX ADMIN — ROCURONIUM BROMIDE 10 MG: 10 INJECTION, SOLUTION INTRAVENOUS at 11:06

## 2019-06-07 RX ADMIN — ALBUTEROL SULFATE 3 PUFF: 90 AEROSOL, METERED RESPIRATORY (INHALATION) at 01:06

## 2019-06-07 RX ADMIN — SODIUM CHLORIDE, SODIUM LACTATE, POTASSIUM CHLORIDE, AND CALCIUM CHLORIDE: .6; .31; .03; .02 INJECTION, SOLUTION INTRAVENOUS at 12:06

## 2019-06-07 RX ADMIN — LIDOCAINE HYDROCHLORIDE 100 MG: 20 INJECTION, SOLUTION INTRAVENOUS at 11:06

## 2019-06-07 RX ADMIN — FENTANYL CITRATE 100 MCG: 50 INJECTION INTRAMUSCULAR; INTRAVENOUS at 11:06

## 2019-06-07 RX ADMIN — ONDANSETRON 4 MG: 2 INJECTION, SOLUTION INTRAMUSCULAR; INTRAVENOUS at 01:06

## 2019-06-07 RX ADMIN — CEFAZOLIN SODIUM 1 G: 1 POWDER, FOR SOLUTION INTRAMUSCULAR; INTRAVENOUS at 11:06

## 2019-06-07 RX ADMIN — SODIUM CHLORIDE, SODIUM LACTATE, POTASSIUM CHLORIDE, AND CALCIUM CHLORIDE: .6; .31; .03; .02 INJECTION, SOLUTION INTRAVENOUS at 09:06

## 2019-06-07 RX ADMIN — GLYCOPYRROLATE 0.2 MG: 0.2 INJECTION, SOLUTION INTRAMUSCULAR; INTRAVENOUS at 11:06

## 2019-06-07 NOTE — PLAN OF CARE
Problem: Adult Inpatient Plan of Care  Goal: Plan of Care Review  Outcome: Ongoing (interventions implemented as appropriate)     06/07/19 1656   Plan of Care Review   Plan of Care Reviewed With patient;friend     Rosaura 10/10. VSS per flow sheet; oxygen saturation decreases when patient sleeping (hx of NELLY). Denies surgical pain. AAOx4. Fatima catheter intact; clear, light pink urine noted. See chart for full assessment.   Friend updated in waiting area around 1630.   Hand off report to CRISSY Sung RN SDS.    Problem: Bleeding (TURP)  Goal: Absence of Bleeding  Outcome: Ongoing (interventions implemented as appropriate)  Intervention: Monitor and Manage Bleeding     06/07/19 1656   Prevent and Manage Risk of Hemorrhage   Bleeding Management other (see comments)  (urine characteristics monitored)         Problem: Ongoing Anesthesia Effects (TURP)  Goal: Anesthesia/Sedation Recovery  Outcome: Outcome(s) achieved Date Met: 06/07/19  Intervention: Optimize Anesthesia Recovery     06/07/19 1656   Optimize Anesthesia Recovery   Anesthesia/Sedation Recovery criteria met for transfer;recovered to baseline   Optimize Balance and Safe Activity   Safety Promotion/Fall Prevention pulse ox;side rails raised x 2;other (see comments)  (hx of nelly; desats while sleeping)         Problem: Pain (TURP)  Goal: Acceptable Pain Control  Outcome: Ongoing (interventions implemented as appropriate)  Intervention: Prevent or Manage Pain     06/07/19 1656   Prevent or Manage Pain   Pain Management Interventions quiet environment facilitated;position adjusted;medication offered but refused;pain management plan reviewed with patient/caregiver;care clustered         Problem: Urinary Elimination Impaired (TURP)  Goal: Effective Urinary Elimination  Outcome: Ongoing (interventions implemented as appropriate)  Intervention: Monitor and Manage Urine Output     06/07/19 1656   Monitor and Manage Cardiac Rhythm Effects   Fluid/Electrolyte Management  fluids provided   Promote Bladder Elimination   Urinary Elimination Promotion catheter patency maintained

## 2019-06-07 NOTE — INTERVAL H&P NOTE
The patient has been examined and the H&P has been reviewed:    I concur with the findings and no changes have occurred since H&P was written.    Anesthesia/Surgery risks, benefits and alternative options discussed and understood by patient/family.          Active Hospital Problems    Diagnosis  POA    Urinary retention [R33.9]  Yes      Resolved Hospital Problems   No resolved problems to display.

## 2019-06-07 NOTE — DISCHARGE INSTRUCTIONS
Expect blood and/or burning with urination. Drink plenty of fluids.       ACTIVITY LEVEL: If you have received sedation or an anesthetic, you may feel sleepy for several hours. Rest until you are more awake. Gradually resume your normal activities.     Use your CPAP machine at home when sleeping or napping especially while you are on medications that may make you sleepy      DIET: You may resume your home diet. If nausea is present, increase your diet gradually with fluids and bland foods.      Medications: Pain medication should be taken only if needed and as directed. If antibiotics are prescribed, the medication should be taken until completed. You will be given an updated list of you medications.  ? No driving, alcoholic beverages or signing legal documents for next 24 hours or while taking pain medication        CALL THE DOCTOR:       · Fever over 101°F  · Severe pain that doesnt go away with medication.  · Upset stomach and vomiting that is persistent.  · Problems urinating-unable to urinate or heavy bleeding (with or without clots)     Fall Prevention  Millions of people fall every year and injure themselves. You may have had anesthesia or sedation which may increase your risk of falling. You may have health issues that put you at an increased risk of falling.     Here are ways to reduce your risk of falling.  ·   · Make your home safe by keeping walkways clear of objects you may trip over.  · Use non-slip pads under rugs. Do not use area rugs or small throw rugs.  · Use non-slip mats in bathtubs and showers.  · Install handrails and lights on staircases.  · Do not walk in poorly lit areas.  · Do not stand on chairs or wobbly ladders.  · Use caution when reaching overhead or looking upward. This position can cause a loss of balance.  · Be sure your shoes fit properly, have non-slip bottoms and are in good condition.   · Wear shoes both inside and out. Avoid going barefoot or wearing slippers.  · Be cautious  when going up and down stairs, curbs, and when walking on uneven sidewalks.  · If your balance is poor, consider using a cane or walker.  · If your fall was related to alcohol use, stop or limit alcohol intake.   · If your fall was related to use of sleeping medicines, talk to your doctor about this. You may need to reduce your dosage at bedtime if you awaken during the night to go to the bathroom.    · To reduce the need for nighttime bathroom trips:  ¨ Avoid drinking fluids for several hours before going to bed  ¨ Empty your bladder before going to bed  ¨ Men can keep a urinal at the bedside  · Stay as active as you can. Balance, flexibility, strength, and endurance all come from exercise. They all play a role in preventing falls. Ask your healthcare provider which types of activity are right for you.  · Get your vision checked on a regular basis.  · If you have pets, know where they are before you stand up or walk so you don't trip over them.  Use night lights.

## 2019-06-07 NOTE — PROGRESS NOTES
CBI clamped by Dr Zapata.   Will monitor in PACU for bleeding until Dr Zapata returns to assess (approximately 1 hour).   Friend updated in waiting area by RN.

## 2019-06-07 NOTE — OP NOTE
[facility]  Surgery Department  Urology Operative Note    SUMMARY     Date of Procedure: 6/7/2019     Surgeon(s) and Role:     * Romina Zapata MD - Primary    Assisting Surgeon: None    Pre-Operative Diagnosis: Urinary retention [R33.9]; BPH    Post-Operative Diagnosis: Post-Op Diagnosis Codes:     * Urinary retention [R33.9]; BPH    Procedure: Procedure(s) (LRB):  TURP WITH BIPOLAR (N/A)    Anesthesia: Choice    Indication for Procedure: 74yo M with UR and BPH.  He presents today for TURP.  He understands the risk of continued urinary retention after procedure.    Description of Procedure: The patient was brought to operating room and placed under general anesthesia. Full time out procedures were performed identifying correct patient, procedure and laterality. Appropriate antibiotics with Ancef were given prior to commencement of surgery. The patient was placed in dorsal lithotomy position and prepped and draped in the usual sterile fashion.    26-Libyan resectoscope was passed into the urethra and into the bladder with visual obturator. The prostatic urethra demonstrated hypertrophy predominantly from the lateral lobes. The bladder was noted to have moderate trabeculations. The ureteral orifices were identified and noted to be normal distance from the bladder neck bilaterally. Care was taken throughout the procedure to ensure that these were not involved in the resection. Using the bipolar loop electrode, the median lobe was resected down to the level of the prostatic capsule. The lateral lobes were then resected in similar fashion. Care was taken to ensure that the resection did not extend distally beyond the verumontanum. He was noted to have a friable prostate with many bleeding points during resection. Bleeding points were addressed as they were encountered. Once the resection had been completed, there was a patent prostatic urethra from the verumontanum to the bladder neck.      Ellik evacuator was used to remove prostatic chips from the bladder until all chips removed. Resectoscope was replaced back into bladder to ensure all prostatic chips were removed. Electrocautery was used to achieve hemostasis. Once hemostasis had been achieved and all prostatic chips removed, the resectoscope was removed after a wire was placed. A 22-Rwandan 3-way Jaimes catheter was placed over the wire with 30 mL sterile water instilled into the balloon.  The catheter was noted not to drain well and was unable to be properly irrigated.  Therefore the catheter was removed and the resectoscope was replaced back into the bladder.  Again the 22 Rwandan catheter was passed back into the bladder over the wire and again was not irrigating well.  For this reason the catheter was removed and replaced with a 24 [place]an 3 way catheter.  This was not irrigate well without issue.  30 cc of sterile water was placed into the balloon.  This was placed on gentle traction.  Low rate CBI was started and noted to run clear. The patient was then awaken from general anesthesia and transferred to the PACU in stable condition.     Complications: No    Estimated Blood Loss (EBL): *20cc    Drains: 24Fr 3-way jaimes catheter           Implants: * No implants in log *    Specimens: Prostate chips  Specimen (12h ago, onward)    Start     Ordered    06/07/19 1254  Specimen to Pathology - Surgery  Once     Comments:  Pre-op Diagnosis: Urinary retention [R33.9]Procedure(s):TURP WITH BIPOLAR Number of specimens: 1Name of specimens: prostate chips     Start Status     06/07/19 1254 Needs to be Collected Order ID: [id number]       06/07/19 1316                  Condition: Good    Disposition: PACU - hemodynamically stable.    Attestation: I was present and scrubbed for the entire procedure.    Discharge Note    SUMMARY     Admit Date: 6/7/2019    Discharge Date and Time:  06/07/2019 1:54 PM    Hospital Course (synopsis of major diagnoses, care,  treatment, and services provided during the course of the hospital stay): Uncomplicated TURP procedure.     Final Diagnosis: Post-Op Diagnosis Codes:     * Urinary retention [R33.9]    Disposition: Home or Self Care    Follow Up/Patient Instructions:     Medications:  Reconciled Home Medications:      Medication List      START taking these medications    docusate sodium 100 MG capsule  Commonly known as:  COLACE  Take 1 capsule (100 mg total) by mouth 2 (two) times daily.     HYDROcodone-acetaminophen 5-325 mg per tablet  Commonly known as:  NORCO  Take 1 tablet by mouth every 6 (six) hours as needed for Pain.     phenazopyridine 100 MG tablet  Commonly known as:  PYRIDIUM  Take 2 tablets (200 mg total) by mouth 3 (three) times daily with meals.        CHANGE how you take these medications    * cephALEXin 500 MG capsule  Commonly known as:  KEFLEX  Take 1 capsule (500 mg total) by mouth 2 (two) times daily. for 7 days  What changed:  Another medication with the same name was added. Make sure you understand how and when to take each.     * cephALEXin 500 MG capsule  Commonly known as:  KEFLEX  Take 1 capsule (500 mg total) by mouth every 12 (twelve) hours.  What changed:  You were already taking a medication with the same name, and this prescription was added. Make sure you understand how and when to take each.         * This list has 2 medication(s) that are the same as other medications prescribed for you. Read the directions carefully, and ask your doctor or other care provider to review them with you.            CONTINUE taking these medications    ADVAIR DISKUS 250-50 mcg/dose diskus inhaler  Generic drug:  fluticasone-salmeterol 250-50 mcg/dose  INHALE 1 PUFF BY MOUTH INTO THE LUNGS TWICE DAILY     albuterol 2.5 mg /3 mL (0.083 %) nebulizer solution  Commonly known as:  PROVENTIL  Take 2.5 mg by nebulization every 6 (six) hours as needed for Wheezing. Rescue     allopurinol 100 MG tablet  Commonly known as:   ZYLOPRIM  TAKE 1 TABLET BY MOUTH EVERY DAY     amLODIPine 10 MG tablet  Commonly known as:  NORVASC  TAKE 1 TABLET BY MOUTH EVERY DAY     losartan 100 MG tablet  Commonly known as:  COZAAR  TAKE 1 TABLET BY MOUTH ONCE DAILY     metoprolol tartrate 100 MG tablet  Commonly known as:  LOPRESSOR  Take 0.5 tablets (50 mg total) by mouth 2 (two) times daily.     omeprazole 20 MG capsule  Commonly known as:  PRILOSEC  TAKE 1 CAPSULE(20 MG) BY MOUTH TWICE DAILY     OSTEO BI-FLEX (5-LOXIN) ORAL  Take 2 tablets by mouth every evening.     pravastatin 40 MG tablet  Commonly known as:  PRAVACHOL  TAKE 1 TABLET(40 MG) BY MOUTH EVERY DAY     tamsulosin 0.4 mg Cap  Commonly known as:  FLOMAX  Take 2 capsules (0.8 mg total) by mouth once daily.          Discharge Procedure Orders   Diet general     Call MD for:  temperature >100.4     Call MD for:  persistent nausea and vomiting     Call MD for:  severe uncontrolled pain     Call MD for:  difficulty breathing, headache or visual disturbances     No dressing needed     Lifting restrictions   Order Comments: Light activity x 4 weeks. No heavy lifting > 10 lbs.     Follow-up Information     Romina Zapata MD On 6/11/2019.    Specialty:  Urology  Contact information:  [address]  [address]  [facility] [address] [phone]

## 2019-06-07 NOTE — TRANSFER OF CARE
"Anesthesia Transfer of Care Note    Patient: John E Sandifer    Procedure(s) Performed: Procedure(s) (LRB):  TURP WITH BIPOLAR (N/A)    Patient location: PACU    Anesthesia Type: general    Transport from OR: Transported from OR on room air with adequate spontaneous ventilation    Post pain: adequate analgesia    Post assessment: no apparent anesthetic complications and tolerated procedure well    Post vital signs: stable    Level of consciousness: awake, alert and responds to stimulation    Nausea/Vomiting: no nausea/vomiting    Complications: none    Transfer of care protocol was followed      Last vitals:   Visit Vitals  /63 (BP Location: Right arm, Patient Position: Lying)   Pulse 71   Temp 36.5 °C (97.7 °F) (Oral)   Resp 12   Ht 5' 8.5" (1.74 m)   Wt 127.5 kg (281 lb)   SpO2 96%   BMI 42.10 kg/m²     "

## 2019-06-11 ENCOUNTER — OFFICE VISIT (OUTPATIENT)
Dept: UROLOGY | Facility: CLINIC | Age: 74
End: 2019-06-11
Payer: MEDICARE

## 2019-06-11 VITALS
BODY MASS INDEX: 41.77 KG/M2 | SYSTOLIC BLOOD PRESSURE: 100 MMHG | WEIGHT: 282 LBS | DIASTOLIC BLOOD PRESSURE: 70 MMHG | HEIGHT: 69 IN

## 2019-06-11 DIAGNOSIS — N13.8 BPH WITH OBSTRUCTION/LOWER URINARY TRACT SYMPTOMS: ICD-10-CM

## 2019-06-11 DIAGNOSIS — N40.1 BPH WITH OBSTRUCTION/LOWER URINARY TRACT SYMPTOMS: ICD-10-CM

## 2019-06-11 DIAGNOSIS — R33.9 URINARY RETENTION: Primary | ICD-10-CM

## 2019-06-11 PROCEDURE — 51700 IRRIGATION OF BLADDER: CPT | Mod: PBBFAC | Performed by: NURSE PRACTITIONER

## 2019-06-11 PROCEDURE — 99024 PR POST-OP FOLLOW-UP VISIT: ICD-10-PCS | Mod: POP,,, | Performed by: NURSE PRACTITIONER

## 2019-06-11 PROCEDURE — 51700 IRRIGATION OF BLADDER: CPT | Mod: S$PBB,58,, | Performed by: NURSE PRACTITIONER

## 2019-06-11 PROCEDURE — 99999 PR PBB SHADOW E&M-EST. PATIENT-LVL IV: CPT | Mod: PBBFAC,,, | Performed by: NURSE PRACTITIONER

## 2019-06-11 PROCEDURE — 99999 PR PBB SHADOW E&M-EST. PATIENT-LVL IV: ICD-10-PCS | Mod: PBBFAC,,, | Performed by: NURSE PRACTITIONER

## 2019-06-11 PROCEDURE — 99024 POSTOP FOLLOW-UP VISIT: CPT | Mod: POP,,, | Performed by: NURSE PRACTITIONER

## 2019-06-11 PROCEDURE — 51700 PR IRRIGATION, BLADDER: ICD-10-PCS | Mod: S$PBB,58,, | Performed by: NURSE PRACTITIONER

## 2019-06-11 PROCEDURE — 99214 OFFICE O/P EST MOD 30 MIN: CPT | Mod: PBBFAC,25 | Performed by: NURSE PRACTITIONER

## 2019-06-11 NOTE — ANESTHESIA POSTPROCEDURE EVALUATION
Anesthesia Post Evaluation    Patient: John E Sandifer    Procedure(s) Performed: Procedure(s) (LRB):  TURP WITH BIPOLAR (N/A)    Final Anesthesia Type: general  Patient location during evaluation: PACU  Patient participation: Yes- Able to Participate  Level of consciousness: awake and alert  Post-procedure vital signs: reviewed and stable  Pain management: adequate  Airway patency: patent  PONV status at discharge: No PONV  Anesthetic complications: no      Cardiovascular status: blood pressure returned to baseline and hemodynamically stable  Respiratory status: unassisted and spontaneous ventilation  Hydration status: euvolemic  Follow-up not needed.          Vitals Value Taken Time   /70 6/7/2019  5:08 PM   Temp 36.4 °C (97.5 °F) 6/7/2019  5:08 PM   Pulse 75 6/7/2019  5:08 PM   Resp 16 6/7/2019  5:08 PM   SpO2 97 % 6/7/2019  5:08 PM         Event Time     Out of Recovery 17:00:39          Pain/Rosaura Score: No data recorded

## 2019-06-11 NOTE — PROGRESS NOTES
Subjective:       Patient ID: John E Sandifer is a 73 y.o. male who was last seen in this office 5/28/2019    Chief Complaint:   Chief Complaint   Patient presents with    Follow-up     Post procedure follow up.. patient states that he feels fine and hopes that the procedure opened things up for him.. VOT to be performed       Urinary Retention  Patient complains of urinary retention. Onset of retention was several days ago and was sudden in onset. Patient currently does have a urinary catheter in place.  2L of urine were drained when catheter was placed. Prior to this event voiding symptoms consisted of slow stream, intermittency, nocturia x 2. Prior treatments include none. Recent medications that may have affected his voiding include none.  Denies prior occurrences of urinary retention.    He presented to the ER on 3/21/19 with a 2 day history of urinary retention. Jaimes placed in the ER and patient started on Flomax x 10 days.     He had a voiding trial in this office on 3/29/19 that was unsuccessful. Jaimes was replaced. He was seen in this office 3 days ago with c/o decreased UOP via jaimes bag. Jaimes irrigated by myself without difficulty. No complications noted with jaimes catheter at that time.    Repeat voiding trial on 4/5/19 was cautiously successful. He reported bloody stools and noted to be hypotensive during his last office visit. He was later admitted for GI blood, hypotension and JOHNY on 4/5/19. He underwent EGD and colonoscopy during admission--no acute findings. Of note, JOHNY and hypotension improved with hydration    Patient unable to urinate during admission. Subsequently jaimes catheter replaced on 4/9/19.  2.4L of urine drained with jaimes insertion. He is currently taking Flomax BID    Most recent voiding trial on 4/18/19 was unsuccessful. Jaimes replaced. He is now s/p cystoscopy with urodynamics on 4/30/19 with Dr. Zapata    Cysto/UDS 4/30/19:  Findings of the Procedure: Jaimes removed. Delayed  sensation. No IBC/DO. Mildly decreased compliance. Normal coordination. Suspect no bladder contraction though detrusor pressure higher with attempt to void. Unable to void with/without P1 in place. Cystoscopy with large lateral lobes of prostate.     Recommendations: Recommend TURP though may have persistent UR due to atonic bladder.     Hematuria  Patient complains of gross hematuria. Onset of hematuria was several days ago and was sudden in onset. There is not a history of nephrolithiasis. There is not a history of urologic trauma. Other urologic symptoms include slow stream, intermittency, nocturia. Patient admits to history of tobacco use. Patient denies history of Agent Orange exposure, chronic Jaimes catheter,  surgeries, sexually transmitted diseases, trauma and urolithiasis. Prior workup has been none.    Patient presented back to the ER the following day with c/o gross hematuria after having jaimes placed. He denies any difficulty or trauma with jaimes placement.  Jaimes catheter noted to be obstructed with blood clots. Catheter was irrigated and replaced in the ER.  He has not had any further occurrences of gross hematuria since this time    CT urogram 4/2019--no obvious etiology for gross hematuria    Cysto/UDS 4/2019--no lesions or masses    Bladder scan--0 ml (previous visit)    6/11/19:  He is now s/p TURP on 6/7/19. He is here today for a voiding trial. He is not having any pain. Denies gross hematuria. No new complaints      ACTIVE MEDICAL ISSUES:  Patient Active Problem List   Diagnosis    A-fib    HTN (hypertension)    HLD (hyperlipidemia)    NELLY (obstructive sleep apnea)    Obesity    Essential hypertension, benign    Obstructive chronic bronchitis with exacerbation    Anticoagulated on Coumadin    COPD (chronic obstructive pulmonary disease)    Knee pain    OA (osteoarthritis) of knee    Chronic bronchitis    Body mass index 40.0-44.9, adult    Abnormal liver ultrasound    Chronic  sinusitis    Epistaxis, recurrent    Encounter for current long-term use of anticoagulants    Allergic rhinitis    Conjunctivitis of left eye    SOB (shortness of breath)    Essential hypertension    Chronic a-fib    Pre-op evaluation    Deviated nasal septum    HTN, goal below 140/90    Left hip pain    Acute pain    Right leg pain    Gouty arthritis of right great toe    Acute gout of right ankle    Urinary retention    Gross hematuria    BPH with obstruction/lower urinary tract symptoms    Gastrointestinal hemorrhage    Acute blood loss anemia    JOHNY (acute kidney injury)    Hypotension    Paroxysmal atrial fibrillation       ALLERGIES AND MEDICATIONS: updated and reviewed.  Review of patient's allergies indicates:   Allergen Reactions    Bee sting [allergen ext-venom-honey bee] Swelling    Culver clement (solidago canadensis) Swelling    Kenalog [triamcinolone acetonide]     Lidocaine      Pt doesn't recall the reaction to lidocaine     Current Outpatient Medications   Medication Sig    ADVAIR DISKUS 250-50 mcg/dose diskus inhaler INHALE 1 PUFF BY MOUTH INTO THE LUNGS TWICE DAILY    albuterol (PROVENTIL) 2.5 mg /3 mL (0.083 %) nebulizer solution Take 2.5 mg by nebulization every 6 (six) hours as needed for Wheezing. Rescue    allopurinol (ZYLOPRIM) 100 MG tablet TAKE 1 TABLET BY MOUTH EVERY DAY    amLODIPine (NORVASC) 10 MG tablet TAKE 1 TABLET BY MOUTH EVERY DAY    cephALEXin (KEFLEX) 500 MG capsule Take 1 capsule (500 mg total) by mouth every 12 (twelve) hours.    docusate sodium (COLACE) 100 MG capsule Take 1 capsule (100 mg total) by mouth 2 (two) times daily.    GLUCOSAMINE/D3/BOSWELLIA TOO (OSTEO BI-FLEX, 5-LOXIN, ORAL) Take 2 tablets by mouth every evening.    HYDROcodone-acetaminophen (NORCO) 5-325 mg per tablet Take 1 tablet by mouth every 6 (six) hours as needed for Pain.    losartan (COZAAR) 100 MG tablet TAKE 1 TABLET BY MOUTH ONCE DAILY    metoprolol tartrate  "(LOPRESSOR) 100 MG tablet Take 0.5 tablets (50 mg total) by mouth 2 (two) times daily.    omeprazole (PRILOSEC) 20 MG capsule TAKE 1 CAPSULE(20 MG) BY MOUTH TWICE DAILY    phenazopyridine (PYRIDIUM) 100 MG tablet Take 2 tablets (200 mg total) by mouth 3 (three) times daily with meals.    pravastatin (PRAVACHOL) 40 MG tablet TAKE 1 TABLET(40 MG) BY MOUTH EVERY DAY    tamsulosin (FLOMAX) 0.4 mg Cap Take 2 capsules (0.8 mg total) by mouth once daily.     No current facility-administered medications for this visit.      Facility-Administered Medications Ordered in Other Visits   Medication    lactated ringers infusion       Review of Systems   Constitutional: Negative for activity change, chills, fatigue, fever and unexpected weight change.   Eyes: Negative for discharge, redness and visual disturbance.   Respiratory: Negative for cough, shortness of breath and wheezing.    Cardiovascular: Negative for chest pain and leg swelling.   Gastrointestinal: Negative for abdominal distention, abdominal pain, constipation, diarrhea, nausea and vomiting.   Genitourinary: Negative for decreased urine volume, discharge, dysuria, flank pain, frequency, hematuria, scrotal swelling, testicular pain and urgency.   Musculoskeletal: Negative for arthralgias, joint swelling and myalgias.   Skin: Negative for color change and rash.   Neurological: Negative for dizziness and light-headedness.   Psychiatric/Behavioral: Negative for behavioral problems and confusion. The patient is not nervous/anxious.        Objective:      Vitals:    06/11/19 0852   BP: 100/70   Weight: 127.9 kg (282 lb)   Height: 5' 8.5" (1.74 m)     Physical Exam   Constitutional: He is oriented to person, place, and time. He appears well-developed.   HENT:   Head: Normocephalic and atraumatic.   Nose: Nose normal.   Eyes: Conjunctivae are normal. Right eye exhibits no discharge. Left eye exhibits no discharge.   Neck: Normal range of motion. Neck supple. No tracheal " "deviation present. No thyromegaly present.   Cardiovascular: Normal rate and regular rhythm.    Pulmonary/Chest: Effort normal. No respiratory distress. He has no wheezes.   Abdominal: Soft. He exhibits no distension. There is no hepatosplenomegaly. There is no tenderness. There is no CVA tenderness. No hernia.   Genitourinary:   Genitourinary Comments: Jaimes draining yellow urine   Musculoskeletal: Normal range of motion. He exhibits no edema.   Neurological: He is alert and oriented to person, place, and time.   Skin: Skin is warm and dry. No rash noted. No erythema.     Psychiatric: He has a normal mood and affect. His behavior is normal. Judgment normal.       Urine dipstick shows not done--jaimes in place    Voiding Trial: 300cc instilled, 300cc voided    Assessment:       1. Urinary retention    2. BPH with obstruction/lower urinary tract symptoms          Plan:       1. Urinary retention  -Multiple unsuccessful voiding trials in the past requiring jaimes replacement  -s/p cysto/UDS on 4/30/19--atonic bladder, large lateral lobes of prostate. Recommend TURP  -s/p TURP on 6/7/19 by Dr. Zapata  - Voiding Trial today--successful  -Continue Flomax for now  -Adequate hydration    2. BPH with obstruction/lower urinary tract symptoms  -s/p TURP on 6/7/19 by Dr. Zapata          ADDENDUM: Patient rtc this afternoon with c/o weak urinary stream since jaimes removed this AM. He reports "trickling" of urine when voiding and urinary frequency. Bladder scan--359 ml. Discussed placement of indwelling jaimes vs CIC teaching.  He would like to perform CIC--teaching done. Patient instructed to perform CIC twice daily. Cath diary provided. Ok to keep scheduled appointment in 4 weeks for follow up    Follow up in about 1 month (around 7/9/2019) for Follow up PVR.    "

## 2019-06-11 NOTE — ANESTHESIA PREPROCEDURE EVALUATION
06/11/2019  John E Sandifer is a 73 y.o., male.    Anesthesia Evaluation     I have reviewed the Nursing Notes.      Review of Systems  Anesthesia Hx:  No problems with previous Anesthesia   Social:  Former Smoker    Cardiovascular:   Denies Pacemaker. Hypertension  Denies Valvular problems/Murmurs.  Denies MI.   Denies CABG/stent. Dysrhythmias atrial fibrillation  Denies Angina.             denies PVD hyperlipidemia    Pulmonary:   COPD Sleep Apnea, CPAP    Renal/:   Chronic Renal Disease BPH    Hepatic/GI:   No Bowel Prep. Denies PUD. GERD Denies Hepatitis.    Musculoskeletal:   Arthritis     Neurological:   Denies CVA. Denies Seizures.    Endocrine:  Endocrine Normal        Physical Exam  General:  Morbid Obesity    Airway/Jaw/Neck:  AIRWAY FINDINGS: Normal      Chest/Lungs:  Chest/Lungs Clear    Heart/Vascular:  Heart Findings: Normal       Mental Status:  Mental Status Findings: Normal        Anesthesia Plan  Type of Anesthesia, risks & benefits discussed:  Anesthesia Type:  general  Patient's Preference:   Intra-op Monitoring Plan: standard ASA monitors  Intra-op Monitoring Plan Comments:   Post Op Pain Control Plan:   Post Op Pain Control Plan Comments:   Induction:   IV  Beta Blocker:  Patient is on a Beta-Blocker and has received one dose within the past 24 hours (No further documentation required).       Informed Consent: Patient understands risks and agrees with Anesthesia plan.  Questions answered. Anesthesia consent signed with patient.  ASA Score: 3     Day of Surgery Review of History & Physical:  There are no significant changes.  H&P update referred to the provider.         Ready For Surgery From Anesthesia Perspective.

## 2019-07-01 ENCOUNTER — HOSPITAL ENCOUNTER (OUTPATIENT)
Dept: RADIOLOGY | Facility: HOSPITAL | Age: 74
Discharge: HOME | End: 2019-07-01
Attending: INTERNAL MEDICINE
Payer: MEDICARE

## 2019-07-01 ENCOUNTER — HOSPITAL ENCOUNTER (OUTPATIENT)
Dept: CARDIOLOGY | Facility: HOSPITAL | Age: 74
Discharge: HOME | End: 2019-07-01
Attending: INTERNAL MEDICINE
Payer: MEDICARE

## 2019-07-01 DIAGNOSIS — R06.02 SHORTNESS OF BREATH: ICD-10-CM

## 2019-07-01 LAB
CV STRESS BASE HR: 68 BPM
DIASTOLIC BLOOD PRESSURE: 49 MMHG
NUC STRESS EJECTION FRACTION: 68 %
OHS CV CPX 85 PERCENT MAX PREDICTED HEART RATE MALE: 123
OHS CV CPX MAX PREDICTED HEART RATE: 145
OHS CV CPX PATIENT IS FEMALE: 0
OHS CV CPX PATIENT IS MALE: 1
OHS CV CPX PEAK DIASTOLIC BLOOD PRESSURE: 46 MMHG
OHS CV CPX PEAK HEAR RATE: 79 BPM
OHS CV CPX PEAK RATE PRESSURE PRODUCT: NORMAL
OHS CV CPX PEAK SYSTOLIC BLOOD PRESSURE: 130 MMHG
OHS CV CPX PERCENT MAX PREDICTED HEART RATE ACHIEVED: 54
OHS CV CPX RATE PRESSURE PRODUCT PRESENTING: 8364
STRESS ECHO TARGET HR: 123.25 BPM
SYSTOLIC BLOOD PRESSURE: 123 MMHG

## 2019-07-01 PROCEDURE — 78452 HT MUSCLE IMAGE SPECT MULT: CPT | Mod: 26,,, | Performed by: INTERNAL MEDICINE

## 2019-07-01 PROCEDURE — 63600175 PHARM REV CODE 636 W HCPCS: Performed by: INTERNAL MEDICINE

## 2019-07-01 PROCEDURE — 93017 CV STRESS TEST TRACING ONLY: CPT

## 2019-07-01 PROCEDURE — 93016 STRESS TEST WITH MYOCARDIAL PERFUSION (CUPID ONLY): ICD-10-PCS | Mod: ,,, | Performed by: INTERNAL MEDICINE

## 2019-07-01 PROCEDURE — 93016 CV STRESS TEST SUPVJ ONLY: CPT | Mod: ,,, | Performed by: INTERNAL MEDICINE

## 2019-07-01 PROCEDURE — 78452 STRESS TEST WITH MYOCARDIAL PERFUSION (CUPID ONLY): ICD-10-PCS | Mod: 26,,, | Performed by: INTERNAL MEDICINE

## 2019-07-01 PROCEDURE — 93018 STRESS TEST WITH MYOCARDIAL PERFUSION (CUPID ONLY): ICD-10-PCS | Mod: ,,, | Performed by: INTERNAL MEDICINE

## 2019-07-01 PROCEDURE — 93018 CV STRESS TEST I&R ONLY: CPT | Mod: ,,, | Performed by: INTERNAL MEDICINE

## 2019-07-01 PROCEDURE — A9502 TC99M TETROFOSMIN: HCPCS

## 2019-07-01 RX ORDER — REGADENOSON 0.08 MG/ML
0.4 INJECTION, SOLUTION INTRAVENOUS ONCE
Status: COMPLETED | OUTPATIENT
Start: 2019-07-01 | End: 2019-07-01

## 2019-07-01 RX ADMIN — REGADENOSON 0.4 MG: 0.08 INJECTION, SOLUTION INTRAVENOUS at 08:07

## 2019-07-05 ENCOUNTER — LAB VISIT (OUTPATIENT)
Dept: LAB | Facility: HOSPITAL | Age: 74
End: 2019-07-05
Attending: INTERNAL MEDICINE
Payer: MEDICARE

## 2019-07-05 DIAGNOSIS — A49.9 BACTERIAL UTI: ICD-10-CM

## 2019-07-05 DIAGNOSIS — Z51.81 ENCOUNTER FOR THERAPEUTIC DRUG MONITORING: ICD-10-CM

## 2019-07-05 DIAGNOSIS — N39.0 BACTERIAL UTI: ICD-10-CM

## 2019-07-05 LAB — VANCOMYCIN TROUGH SERPL-MCNC: 7.7 UG/ML (ref 10–22)

## 2019-07-05 PROCEDURE — 80202 ASSAY OF VANCOMYCIN: CPT

## 2019-07-05 PROCEDURE — 36415 COLL VENOUS BLD VENIPUNCTURE: CPT

## 2019-07-09 ENCOUNTER — OFFICE VISIT (OUTPATIENT)
Dept: UROLOGY | Facility: CLINIC | Age: 74
End: 2019-07-09
Payer: MEDICARE

## 2019-07-09 VITALS
DIASTOLIC BLOOD PRESSURE: 80 MMHG | WEIGHT: 280 LBS | BODY MASS INDEX: 41.47 KG/M2 | SYSTOLIC BLOOD PRESSURE: 118 MMHG | HEIGHT: 69 IN

## 2019-07-09 DIAGNOSIS — S82.891D CLOSED FRACTURE OF ANKLE, RIGHT, WITH ROUTINE HEALING, SUBSEQUENT ENCOUNTER: Primary | ICD-10-CM

## 2019-07-09 DIAGNOSIS — N13.8 BPH WITH OBSTRUCTION/LOWER URINARY TRACT SYMPTOMS: ICD-10-CM

## 2019-07-09 DIAGNOSIS — N40.1 BPH WITH OBSTRUCTION/LOWER URINARY TRACT SYMPTOMS: ICD-10-CM

## 2019-07-09 DIAGNOSIS — R33.9 URINARY RETENTION: Primary | ICD-10-CM

## 2019-07-09 LAB
BILIRUB SERPL-MCNC: NORMAL MG/DL
BLOOD URINE, POC: 50
COLOR, POC UA: YELLOW
GLUCOSE UR QL STRIP: NORMAL
KETONES UR QL STRIP: NORMAL
LEUKOCYTE ESTERASE URINE, POC: NORMAL
NITRITE, POC UA: NORMAL
PH, POC UA: 7
PROTEIN, POC: NORMAL
SPECIFIC GRAVITY, POC UA: 1010
UROBILINOGEN, POC UA: NORMAL

## 2019-07-09 PROCEDURE — 99999 PR PBB SHADOW E&M-EST. PATIENT-LVL IV: ICD-10-PCS | Mod: PBBFAC,,, | Performed by: NURSE PRACTITIONER

## 2019-07-09 PROCEDURE — 99999 PR PBB SHADOW E&M-EST. PATIENT-LVL IV: CPT | Mod: PBBFAC,,, | Performed by: NURSE PRACTITIONER

## 2019-07-09 PROCEDURE — 81001 URINALYSIS AUTO W/SCOPE: CPT | Mod: PBBFAC | Performed by: NURSE PRACTITIONER

## 2019-07-09 PROCEDURE — 99024 POSTOP FOLLOW-UP VISIT: CPT | Mod: POP,,, | Performed by: NURSE PRACTITIONER

## 2019-07-09 PROCEDURE — 99214 OFFICE O/P EST MOD 30 MIN: CPT | Mod: PBBFAC | Performed by: NURSE PRACTITIONER

## 2019-07-09 PROCEDURE — 51798 US URINE CAPACITY MEASURE: CPT | Mod: PBBFAC | Performed by: NURSE PRACTITIONER

## 2019-07-09 PROCEDURE — 99024 PR POST-OP FOLLOW-UP VISIT: ICD-10-PCS | Mod: POP,,, | Performed by: NURSE PRACTITIONER

## 2019-07-09 NOTE — PROGRESS NOTES
Subjective:       Patient ID: John E Sandifer is a 74 y.o. male who was last seen in this office 6/11/2019    Chief Complaint:   Chief Complaint   Patient presents with    Follow-up     Patient states all has been well.. he's been measuring his out put and as long as he stays hydrated he see's normal out put.. states no new issues        Urinary Retention  Patient complains of urinary retention. Onset of retention was several days ago and was sudden in onset. Patient currently does have a urinary catheter in place.  2L of urine were drained when catheter was placed. Prior to this event voiding symptoms consisted of slow stream, intermittency, nocturia x 2. Prior treatments include none. Recent medications that may have affected his voiding include none.  Denies prior occurrences of urinary retention.     He presented to the ER on 3/21/19 with a 2 day history of urinary retention. Jaimes placed in the ER and patient started on Flomax x 10 days.      He had a voiding trial in this office on 3/29/19 that was unsuccessful. Jaimes was replaced. He was seen in this office 3 days ago with c/o decreased UOP via jaimes bag. Jaimes irrigated by myself without difficulty. No complications noted with jaimes catheter at that time.     Repeat voiding trial on 4/5/19 was cautiously successful. He reported bloody stools and noted to be hypotensive during his last office visit. He was later admitted for GI blood, hypotension and JOHNY on 4/5/19. He underwent EGD and colonoscopy during admission--no acute findings. Of note, JOHNY and hypotension improved with hydration     Patient unable to urinate during admission. Subsequently jaimes catheter replaced on 4/9/19.  2.4L of urine drained with jaimes insertion. He is currently taking Flomax BID     Most recent voiding trial on 4/18/19 was unsuccessful. Jaimes replaced. He is now s/p cystoscopy with urodynamics on 4/30/19 with Dr. Zapata     Cysto/UDS 4/30/19:  Findings of the Procedure: Jaimes  removed. Delayed sensation. No IBC/DO. Mildly decreased compliance. Normal coordination. Suspect no bladder contraction though detrusor pressure higher with attempt to void. Unable to void with/without P1 in place. Cystoscopy with large lateral lobes of prostate.     Recommendations: Recommend TURP though may have persistent UR due to atonic bladder.      Hematuria  Patient complains of gross hematuria. Onset of hematuria was several days ago and was sudden in onset. There is not a history of nephrolithiasis. There is not a history of urologic trauma. Other urologic symptoms include slow stream, intermittency, nocturia. Patient admits to history of tobacco use. Patient denies history of Agent Orange exposure, chronic Jaimes catheter,  surgeries, sexually transmitted diseases, trauma and urolithiasis. Prior workup has been none.     Patient presented back to the ER the following day with c/o gross hematuria after having jaimes placed. He denies any difficulty or trauma with jaimes placement.  Jaimes catheter noted to be obstructed with blood clots. Catheter was irrigated and replaced in the ER.  He has not had any further occurrences of gross hematuria since this time     CT urogram 4/2019--no obvious etiology for gross hematuria     Cysto/UDS 4/2019--no lesions or masses     Bladder scan--0 ml (previous visit)     7/9/19:  He is now s/p TURP on 6/7/19. He passed his post op voiding trial on 6/11/19. Unfortunately he returned to clinic later that same day due to difficulty voiding. Bladder scan--359 ml. He opted for CIC teaching.     He returns today with his cath diary. He is voiding ~200-700 cc. Cath volumes--0 cc He is no longer requiring CIC..   He remains on Flomax. Reports nocturia x 0. Denies dysuria, gross hematuria, urgency, frequency or flank pain. Reports strong urinary stream. No new complaints    PVR (bladder scan) today - 0 ml    ACTIVE MEDICAL ISSUES:  Patient Active Problem List   Diagnosis    A-fib     HTN (hypertension)    HLD (hyperlipidemia)    NELLY (obstructive sleep apnea)    Obesity    Essential hypertension, benign    Obstructive chronic bronchitis with exacerbation    Anticoagulated on Coumadin    COPD (chronic obstructive pulmonary disease)    Knee pain    OA (osteoarthritis) of knee    Chronic bronchitis    Body mass index 40.0-44.9, adult    Abnormal liver ultrasound    Chronic sinusitis    Epistaxis, recurrent    Encounter for current long-term use of anticoagulants    Allergic rhinitis    Conjunctivitis of left eye    SOB (shortness of breath)    Essential hypertension    Chronic a-fib    Pre-op evaluation    Deviated nasal septum    HTN, goal below 140/90    Left hip pain    Acute pain    Right leg pain    Gouty arthritis of right great toe    Acute gout of right ankle    Urinary retention    Gross hematuria    BPH with obstruction/lower urinary tract symptoms    Gastrointestinal hemorrhage    Acute blood loss anemia    JOHNY (acute kidney injury)    Hypotension    Paroxysmal atrial fibrillation       ALLERGIES AND MEDICATIONS: updated and reviewed.  Review of patient's allergies indicates:   Allergen Reactions    Bee sting [allergen ext-venom-honey bee] Swelling    Culver clement (solidago canadensis) Swelling    Kenalog [triamcinolone acetonide]     Lidocaine      Pt doesn't recall the reaction to lidocaine     Current Outpatient Medications   Medication Sig    ADVAIR DISKUS 250-50 mcg/dose diskus inhaler INHALE 1 PUFF BY MOUTH INTO THE LUNGS TWICE DAILY    albuterol (PROVENTIL) 2.5 mg /3 mL (0.083 %) nebulizer solution Take 2.5 mg by nebulization every 6 (six) hours as needed for Wheezing. Rescue    allopurinol (ZYLOPRIM) 100 MG tablet TAKE 1 TABLET BY MOUTH EVERY DAY    amLODIPine (NORVASC) 10 MG tablet TAKE 1 TABLET BY MOUTH EVERY DAY    catheter 14 Fr Misc 1 each by Misc.(Non-Drug; Combo Route) route 2 (two) times daily.    cephALEXin (KEFLEX) 500 MG  capsule Take 1 capsule (500 mg total) by mouth every 12 (twelve) hours.    docusate sodium (COLACE) 100 MG capsule Take 1 capsule (100 mg total) by mouth 2 (two) times daily.    GLUCOSAMINE/D3/BOSWELLIA TOO (OSTEO BI-FLEX, 5-LOXIN, ORAL) Take 2 tablets by mouth every evening.    HYDROcodone-acetaminophen (NORCO) 5-325 mg per tablet Take 1 tablet by mouth every 6 (six) hours as needed for Pain.    losartan (COZAAR) 100 MG tablet TAKE 1 TABLET BY MOUTH ONCE DAILY    metoprolol tartrate (LOPRESSOR) 100 MG tablet Take 0.5 tablets (50 mg total) by mouth 2 (two) times daily.    omeprazole (PRILOSEC) 20 MG capsule TAKE 1 CAPSULE(20 MG) BY MOUTH TWICE DAILY    phenazopyridine (PYRIDIUM) 100 MG tablet Take 2 tablets (200 mg total) by mouth 3 (three) times daily with meals.    pravastatin (PRAVACHOL) 40 MG tablet TAKE 1 TABLET(40 MG) BY MOUTH EVERY DAY    tamsulosin (FLOMAX) 0.4 mg Cap Take 2 capsules (0.8 mg total) by mouth once daily.     No current facility-administered medications for this visit.      Facility-Administered Medications Ordered in Other Visits   Medication    lactated ringers infusion       Review of Systems   Constitutional: Negative for activity change, chills, fatigue, fever and unexpected weight change.   Eyes: Negative for discharge, redness and visual disturbance.   Respiratory: Negative for cough, shortness of breath and wheezing.    Cardiovascular: Negative for chest pain and leg swelling.   Gastrointestinal: Negative for abdominal distention, abdominal pain, constipation, diarrhea, nausea and vomiting.   Genitourinary: Negative for decreased urine volume, difficulty urinating, dysuria, flank pain, frequency, hematuria, scrotal swelling and urgency.   Musculoskeletal: Negative for arthralgias, joint swelling and myalgias.   Skin: Negative for color change and rash.   Neurological: Negative for dizziness and light-headedness.   Psychiatric/Behavioral: Negative for behavioral problems and  "confusion. The patient is not nervous/anxious.        Objective:      Vitals:    07/09/19 0852   BP: 118/80   Weight: 127 kg (279 lb 15.8 oz)   Height: 5' 8.5" (1.74 m)     Physical Exam   Constitutional: He is oriented to person, place, and time. He appears well-developed.   HENT:   Head: Normocephalic and atraumatic.   Nose: Nose normal.   Eyes: Conjunctivae are normal. Right eye exhibits no discharge. Left eye exhibits no discharge.   Neck: Normal range of motion. Neck supple. No tracheal deviation present. No thyromegaly present.   Cardiovascular: Normal rate and regular rhythm.    Pulmonary/Chest: Effort normal. No respiratory distress. He has no wheezes.   Abdominal: Soft. He exhibits no distension. There is no hepatosplenomegaly. There is no tenderness. There is no CVA tenderness. No hernia.   Genitourinary:   Genitourinary Comments: Patient declined exam   Musculoskeletal: Normal range of motion. He exhibits no edema.   Neurological: He is alert and oriented to person, place, and time.   Skin: Skin is warm and dry. No rash noted. No erythema.     Psychiatric: He has a normal mood and affect. His behavior is normal. Judgment normal.       Urine dipstick shows +LE, ++protein, 50 RBCs.     Assessment:       1. Urinary retention    2. BPH with obstruction/lower urinary tract symptoms          Plan:       1. Urinary retention  -Multiple unsuccessful voiding trials in the past requiring jaimes replacement  -s/p cysto/UDS on 4/30/19--atonic bladder, large lateral lobes of prostate. Recommend TURP  -s/p TURP on 6/7/19 by Dr. Zapata  -Voiding Trial 6/11/19--successful. RTC same day of VOT d/t difficulty voiding--CIC teaching done  -Cath PVRs--0 ml. CIC no longer needed  -PVR today--0 ml  -Flomax for now--ok for once daily dosing  - POCT urinalysis, dipstick or tablet reag  - POCT Bladder Scan    2. BPH with obstruction/lower urinary tract symptoms  -s/p TURP on 6/7/19 by Dr. Zapata  - POCT urinalysis, dipstick or " tablet reag              Follow up in about 3 months (around 10/9/2019) for Follow up PVR.

## 2019-07-11 ENCOUNTER — OFFICE VISIT (OUTPATIENT)
Dept: CARDIOLOGY | Facility: CLINIC | Age: 74
End: 2019-07-11
Payer: MEDICARE

## 2019-07-11 VITALS
DIASTOLIC BLOOD PRESSURE: 65 MMHG | SYSTOLIC BLOOD PRESSURE: 137 MMHG | HEIGHT: 69 IN | HEART RATE: 61 BPM | OXYGEN SATURATION: 95 % | WEIGHT: 275.56 LBS | BODY MASS INDEX: 40.81 KG/M2

## 2019-07-11 DIAGNOSIS — J43.1 PANLOBULAR EMPHYSEMA: Primary | Chronic | ICD-10-CM

## 2019-07-11 DIAGNOSIS — I48.20 CHRONIC ATRIAL FIBRILLATION: ICD-10-CM

## 2019-07-11 DIAGNOSIS — I10 ESSENTIAL HYPERTENSION: ICD-10-CM

## 2019-07-11 DIAGNOSIS — E78.2 MIXED HYPERLIPIDEMIA: ICD-10-CM

## 2019-07-11 PROCEDURE — 99213 OFFICE O/P EST LOW 20 MIN: CPT | Mod: PBBFAC | Performed by: INTERNAL MEDICINE

## 2019-07-11 PROCEDURE — 99999 PR PBB SHADOW E&M-EST. PATIENT-LVL III: ICD-10-PCS | Mod: PBBFAC,,, | Performed by: INTERNAL MEDICINE

## 2019-07-11 PROCEDURE — 99999 PR PBB SHADOW E&M-EST. PATIENT-LVL III: CPT | Mod: PBBFAC,,, | Performed by: INTERNAL MEDICINE

## 2019-07-11 PROCEDURE — 99203 PR OFFICE/OUTPT VISIT, NEW, LEVL III, 30-44 MIN: ICD-10-PCS | Mod: S$PBB,,, | Performed by: INTERNAL MEDICINE

## 2019-07-11 PROCEDURE — 99203 OFFICE O/P NEW LOW 30 MIN: CPT | Mod: S$PBB,,, | Performed by: INTERNAL MEDICINE

## 2019-07-11 NOTE — PROGRESS NOTES
Subjective:    Patient ID:  John E Sandifer is a 74 y.o. male who presents for follow-up of Results      HPI     Chonic A-fib - not on OAC due to recurrent GIB, HTN, HLD, NELLY, obesity    Previously saw Dr Gallo 6/6/19  Patient is here for follow-up of chronic atrial fibrillation.  He was remotely seen by Kurt 2013 did not follow-up at that time.  He was recently seen by me in the hospital in April when he was admitted for GI bleed.  He had endoscopy at that time is not taking any oral anticoagulants.  Even off of blood thinners he had issues with bleeding and has been a prohibitive risk.  He was not even cleared for aspirin at that time.  He gets shortness of breath with heavy activity but is unable to do any exercise due to knee pain.  He is wearing a Fatima currently.  He is scheduled for prostate shaving tomorrow.  We discussed that this is a lower risk procedure.  Despite his low functional exercise tolerance he has not had any cardiopulmonary complaints otherwise.  He has continued his beta-blocker which he needs to do perioperatively.  He denies any PND, orthopnea or lower extremity edema.  He denies any dizziness, presyncope or syncope.  He wears CPAP at night and with naps.    Stress test 7/1/19    The perfusion scan is free of evidence from myocardial ischemia or injury.    There is a  moderate intensity fixed defect in the inferior wall of the left ventricle secondary to diaphragm attenuation.    Gated perfusion images showed an ejection fraction of 68 % post stress.    The EKG portion of this study is negative for ischemia.    A-fib was noted throughout the study    Echo 4/10/19  · TDS  · Normal left ventricular systolic function. The estimated ejection fraction is 55%  · Concentric left ventricular remodeling.  · Severe left atrial enlargement.    StableDOE  Denies CP    Review of Systems   Constitution: Negative for decreased appetite.   HENT: Negative for ear discharge.    Eyes: Negative for  blurred vision.   Endocrine: Negative for polyphagia.   Skin: Negative for nail changes.   Genitourinary: Negative for bladder incontinence.   Neurological: Negative for aphonia.   Psychiatric/Behavioral: Negative for hallucinations.   Allergic/Immunologic: Negative for hives.        Objective:    Physical Exam   Constitutional: He is oriented to person, place, and time. He appears well-developed and well-nourished. No distress.   HENT:   Head: Normocephalic and atraumatic.   Eyes: Pupils are equal, round, and reactive to light. Conjunctivae and EOM are normal.   Neck: Normal range of motion. Neck supple. No thyromegaly present.   Cardiovascular: Normal rate, regular rhythm and normal heart sounds.   No murmur heard.  Pulmonary/Chest: Effort normal and breath sounds normal. No respiratory distress. He has no wheezes. He has no rales. He exhibits no tenderness.   Abdominal: Soft. Bowel sounds are normal.   Musculoskeletal: He exhibits no edema.   Neurological: He is alert and oriented to person, place, and time.   Skin: Skin is warm and dry.   Psychiatric: He has a normal mood and affect. His behavior is normal.         Assessment:       1. Panlobular emphysema    2. Chronic atrial fibrillation    3. Essential hypertension    4. Mixed hyperlipidemia         Plan:       Cardiac stable  Not on OAC due to recurrent GIB  OV 6 months

## 2019-10-09 ENCOUNTER — OFFICE VISIT (OUTPATIENT)
Dept: UROLOGY | Facility: CLINIC | Age: 74
End: 2019-10-09
Payer: MEDICARE

## 2019-10-09 VITALS
HEIGHT: 69 IN | SYSTOLIC BLOOD PRESSURE: 121 MMHG | DIASTOLIC BLOOD PRESSURE: 70 MMHG | WEIGHT: 283.94 LBS | BODY MASS INDEX: 42.05 KG/M2

## 2019-10-09 DIAGNOSIS — N13.8 BPH WITH OBSTRUCTION/LOWER URINARY TRACT SYMPTOMS: ICD-10-CM

## 2019-10-09 DIAGNOSIS — R33.9 URINARY RETENTION: Primary | ICD-10-CM

## 2019-10-09 DIAGNOSIS — N40.1 BPH WITH OBSTRUCTION/LOWER URINARY TRACT SYMPTOMS: ICD-10-CM

## 2019-10-09 LAB
BILIRUB SERPL-MCNC: NORMAL MG/DL
BLOOD URINE, POC: NORMAL
COLOR, POC UA: YELLOW
GLUCOSE UR QL STRIP: NORMAL
KETONES UR QL STRIP: NORMAL
LEUKOCYTE ESTERASE URINE, POC: NORMAL
NITRITE, POC UA: NORMAL
PH, POC UA: 6
PROTEIN, POC: NORMAL
SPECIFIC GRAVITY, POC UA: 1010
UROBILINOGEN, POC UA: NORMAL

## 2019-10-09 PROCEDURE — 81001 URINALYSIS AUTO W/SCOPE: CPT | Mod: PBBFAC | Performed by: NURSE PRACTITIONER

## 2019-10-09 PROCEDURE — 99214 OFFICE O/P EST MOD 30 MIN: CPT | Mod: PBBFAC | Performed by: NURSE PRACTITIONER

## 2019-10-09 PROCEDURE — 99214 OFFICE O/P EST MOD 30 MIN: CPT | Mod: S$PBB,,, | Performed by: NURSE PRACTITIONER

## 2019-10-09 PROCEDURE — 99214 PR OFFICE/OUTPT VISIT, EST, LEVL IV, 30-39 MIN: ICD-10-PCS | Mod: S$PBB,,, | Performed by: NURSE PRACTITIONER

## 2019-10-09 PROCEDURE — 99999 PR PBB SHADOW E&M-EST. PATIENT-LVL IV: CPT | Mod: PBBFAC,,, | Performed by: NURSE PRACTITIONER

## 2019-10-09 PROCEDURE — 99999 PR PBB SHADOW E&M-EST. PATIENT-LVL IV: ICD-10-PCS | Mod: PBBFAC,,, | Performed by: NURSE PRACTITIONER

## 2019-10-09 PROCEDURE — 51798 US URINE CAPACITY MEASURE: CPT | Mod: PBBFAC | Performed by: NURSE PRACTITIONER

## 2019-10-09 RX ORDER — TRAMADOL HYDROCHLORIDE 50 MG/1
TABLET ORAL
Status: ON HOLD | COMMUNITY
Start: 2019-10-05 | End: 2021-08-02 | Stop reason: HOSPADM

## 2019-10-09 RX ORDER — TAMSULOSIN HYDROCHLORIDE 0.4 MG/1
0.4 CAPSULE ORAL DAILY
Qty: 90 CAPSULE | Refills: 3 | Status: SHIPPED | OUTPATIENT
Start: 2019-10-09 | End: 2020-11-30 | Stop reason: SDUPTHER

## 2019-10-09 NOTE — PROGRESS NOTES
Subjective:       Patient ID: John E Sandifer is a 74 y.o. male who was last seen in this office 7/9/2019    Chief Complaint:   Chief Complaint   Patient presents with    Follow-up     Pt. states everything has been pretty good .. no issues urinating .. states no other issues      Hematuria  Patient complains of gross hematuria. Onset of hematuria was several days ago and was sudden in onset. There is not a history of nephrolithiasis. There is not a history of urologic trauma. Other urologic symptoms include slow stream, intermittency, nocturia. Patient admits to history of tobacco use. Patient denies history of Agent Orange exposure, chronic Jaimes catheter,  surgeries, sexually transmitted diseases, trauma and urolithiasis. Prior workup has been none.     Patient presented back to the ER the following day with c/o gross hematuria after having jaimes placed. He denies any difficulty or trauma with jaimes placement.  Jaimes catheter noted to be obstructed with blood clots. Catheter was irrigated and replaced in the ER.  He has not had any further occurrences of gross hematuria since this time     CT urogram 4/2019--no obvious etiology for gross hematuria     Cysto/UDS 4/2019--no lesions or masses     Bladder scan--0 ml (previous visit)     7/9/19:  He is now s/p TURP on 6/7/19. He passed his post op voiding trial on 6/11/19. Unfortunately he returned to clinic later that same day due to difficulty voiding. Bladder scan--359 ml. He opted for CIC teaching.     He returns today with his cath diary. He is voiding ~200-700 cc. Cath volumes--0 cc He is no longer requiring CIC..   He remains on Flomax. Reports nocturia x 0. Denies dysuria, gross hematuria, urgency, frequency or flank pain. Reports strong urinary stream. No new complaints    PVR (bladder scan) today - 0 ml    10/9/19:  He is here today for a PVR check. He is voiding without difficulty. No new complaints    PVR (bladder scan) today - 0 ml    ACTIVE MEDICAL  ISSUES:  Patient Active Problem List   Diagnosis    A-fib    HTN (hypertension)    HLD (hyperlipidemia)    NELLY (obstructive sleep apnea)    Obesity    Essential hypertension, benign    Obstructive chronic bronchitis with exacerbation    Anticoagulated on Coumadin    COPD (chronic obstructive pulmonary disease)    Knee pain    OA (osteoarthritis) of knee    Chronic bronchitis    Body mass index 40.0-44.9, adult    Abnormal liver ultrasound    Chronic sinusitis    Epistaxis, recurrent    Encounter for current long-term use of anticoagulants    Allergic rhinitis    Conjunctivitis of left eye    SOB (shortness of breath)    Essential hypertension    Chronic a-fib    Pre-op evaluation    Deviated nasal septum    HTN, goal below 140/90    Left hip pain    Acute pain    Right leg pain    Gouty arthritis of right great toe    Acute gout of right ankle    Urinary retention    Gross hematuria    BPH with obstruction/lower urinary tract symptoms    Gastrointestinal hemorrhage    Acute blood loss anemia    JOHNY (acute kidney injury)    Hypotension    Paroxysmal atrial fibrillation       ALLERGIES AND MEDICATIONS: updated and reviewed.  Review of patient's allergies indicates:   Allergen Reactions    Bee sting [allergen ext-venom-honey bee] Swelling    Culver clement (solidago canadensis) Swelling    Kenalog [triamcinolone acetonide]     Lidocaine      Pt doesn't recall the reaction to lidocaine     Current Outpatient Medications   Medication Sig    ADVAIR DISKUS 250-50 mcg/dose diskus inhaler INHALE 1 PUFF BY MOUTH INTO THE LUNGS TWICE DAILY    albuterol (PROVENTIL) 2.5 mg /3 mL (0.083 %) nebulizer solution Take 2.5 mg by nebulization every 6 (six) hours as needed for Wheezing. Rescue    allopurinol (ZYLOPRIM) 100 MG tablet TAKE 1 TABLET BY MOUTH EVERY DAY    amLODIPine (NORVASC) 10 MG tablet TAKE 1 TABLET BY MOUTH EVERY DAY    catheter 14 Fr Misc 1 each by Misc.(Non-Drug; Combo Route)  route 2 (two) times daily.    cephALEXin (KEFLEX) 500 MG capsule Take 1 capsule (500 mg total) by mouth every 12 (twelve) hours.    docusate sodium (COLACE) 100 MG capsule Take 1 capsule (100 mg total) by mouth 2 (two) times daily.    GLUCOSAMINE/D3/BOSWELLIA TOO (OSTEO BI-FLEX, 5-LOXIN, ORAL) Take 2 tablets by mouth every evening.    HYDROcodone-acetaminophen (NORCO) 5-325 mg per tablet Take 1 tablet by mouth every 6 (six) hours as needed for Pain.    losartan (COZAAR) 100 MG tablet TAKE 1 TABLET BY MOUTH ONCE DAILY    metoprolol tartrate (LOPRESSOR) 100 MG tablet Take 0.5 tablets (50 mg total) by mouth 2 (two) times daily.    omeprazole (PRILOSEC) 20 MG capsule TAKE 1 CAPSULE(20 MG) BY MOUTH TWICE DAILY    phenazopyridine (PYRIDIUM) 100 MG tablet Take 2 tablets (200 mg total) by mouth 3 (three) times daily with meals.    pravastatin (PRAVACHOL) 40 MG tablet TAKE 1 TABLET(40 MG) BY MOUTH EVERY DAY    tamsulosin (FLOMAX) 0.4 mg Cap Take 1 capsule (0.4 mg total) by mouth once daily.    traMADol (ULTRAM) 50 mg tablet      No current facility-administered medications for this visit.      Facility-Administered Medications Ordered in Other Visits   Medication    lactated ringers infusion       Review of Systems   Constitutional: Negative for activity change, chills, fatigue, fever and unexpected weight change.   Eyes: Negative for discharge, redness and visual disturbance.   Respiratory: Negative for cough, shortness of breath and wheezing.    Cardiovascular: Negative for chest pain and leg swelling.   Gastrointestinal: Negative for abdominal distention, abdominal pain, constipation, diarrhea, nausea and vomiting.   Genitourinary: Negative for decreased urine volume, difficulty urinating, discharge, dysuria, flank pain, frequency, hematuria, scrotal swelling, testicular pain and urgency.   Musculoskeletal: Negative for arthralgias, joint swelling and myalgias.   Skin: Negative for color change and rash.  "  Neurological: Negative for dizziness and light-headedness.   Psychiatric/Behavioral: Negative for behavioral problems and confusion. The patient is not nervous/anxious.        Objective:      Vitals:    10/09/19 0848   BP: 121/70   Weight: 128.8 kg (283 lb 15.2 oz)   Height: 5' 8.5" (1.74 m)     Physical Exam   Constitutional: He is oriented to person, place, and time. He appears well-developed.   HENT:   Head: Normocephalic and atraumatic.   Nose: Nose normal.   Eyes: Conjunctivae are normal. Right eye exhibits no discharge. Left eye exhibits no discharge.   Neck: Normal range of motion. Neck supple. No tracheal deviation present. No thyromegaly present.   Cardiovascular: Normal rate and regular rhythm.    Pulmonary/Chest: Effort normal. No respiratory distress. He has no wheezes.   Abdominal: Soft. He exhibits no distension. There is no hepatosplenomegaly. There is no tenderness. There is no CVA tenderness. No hernia.   Genitourinary:   Genitourinary Comments: Patient declined exam   Musculoskeletal: Normal range of motion. He exhibits no edema.   Neurological: He is alert and oriented to person, place, and time.   Skin: Skin is warm and dry. No rash noted. No erythema.     Psychiatric: He has a normal mood and affect. His behavior is normal. Judgment normal.       Urine dipstick shows negative for all components.  Micro exam: negative for WBC's or RBC's.    Assessment:       1. Urinary retention    2. BPH with obstruction/lower urinary tract symptoms          Plan:       1. Urinary retention  -Multiple unsuccessful voiding trials in the past requiring jaimes replacement  -s/p cysto/UDS on 4/30/19--atonic bladder, large lateral lobes of prostate. Recommend TURP  -s/p TURP on 6/7/19 by Dr. Zapata  -Voiding Trial 6/11/19--successful. RTC same day of VOT d/t difficulty voiding--CIC teaching done  -Acceptable PVRs  -Flomax 0.4 mg daily  - POCT urinalysis, dipstick or tablet reag  - POCT Bladder Scan  - tamsulosin " (FLOMAX) 0.4 mg Cap; Take 1 capsule (0.4 mg total) by mouth once daily.  Dispense: 90 capsule; Refill: 3    2. BPH with obstruction/lower urinary tract symptoms  -s/p TURP on 6/7/19 by Dr. Zapata  -Doing well  - POCT urinalysis, dipstick or tablet reag  - tamsulosin (FLOMAX) 0.4 mg Cap; Take 1 capsule (0.4 mg total) by mouth once daily.  Dispense: 90 capsule; Refill: 3                Follow up in about 6 months (around 4/9/2020) for Follow up.

## 2020-01-17 ENCOUNTER — OFFICE VISIT (OUTPATIENT)
Dept: CARDIOLOGY | Facility: CLINIC | Age: 75
End: 2020-01-17
Payer: MEDICARE

## 2020-01-17 VITALS
HEART RATE: 66 BPM | DIASTOLIC BLOOD PRESSURE: 65 MMHG | WEIGHT: 289.88 LBS | HEIGHT: 68 IN | BODY MASS INDEX: 43.93 KG/M2 | OXYGEN SATURATION: 95 % | RESPIRATION RATE: 15 BRPM | SYSTOLIC BLOOD PRESSURE: 145 MMHG

## 2020-01-17 DIAGNOSIS — E78.00 PURE HYPERCHOLESTEROLEMIA: ICD-10-CM

## 2020-01-17 DIAGNOSIS — R06.02 SOB (SHORTNESS OF BREATH): ICD-10-CM

## 2020-01-17 DIAGNOSIS — J43.1 PANLOBULAR EMPHYSEMA: Chronic | ICD-10-CM

## 2020-01-17 DIAGNOSIS — I48.20 CHRONIC A-FIB: Primary | ICD-10-CM

## 2020-01-17 DIAGNOSIS — R06.09 DOE (DYSPNEA ON EXERTION): ICD-10-CM

## 2020-01-17 DIAGNOSIS — I10 HTN, GOAL BELOW 140/90: ICD-10-CM

## 2020-01-17 PROCEDURE — 99999 PR PBB SHADOW E&M-EST. PATIENT-LVL IV: CPT | Mod: PBBFAC,,, | Performed by: INTERNAL MEDICINE

## 2020-01-17 PROCEDURE — 93005 ELECTROCARDIOGRAM TRACING: CPT | Mod: PBBFAC | Performed by: INTERNAL MEDICINE

## 2020-01-17 PROCEDURE — 93010 ELECTROCARDIOGRAM REPORT: CPT | Mod: S$PBB,,, | Performed by: INTERNAL MEDICINE

## 2020-01-17 PROCEDURE — 1159F PR MEDICATION LIST DOCUMENTED IN MEDICAL RECORD: ICD-10-PCS | Mod: ,,, | Performed by: INTERNAL MEDICINE

## 2020-01-17 PROCEDURE — 93010 EKG 12-LEAD: ICD-10-PCS | Mod: S$PBB,,, | Performed by: INTERNAL MEDICINE

## 2020-01-17 PROCEDURE — 1126F PR PAIN SEVERITY QUANTIFIED, NO PAIN PRESENT: ICD-10-PCS | Mod: ,,, | Performed by: INTERNAL MEDICINE

## 2020-01-17 PROCEDURE — 99999 PR PBB SHADOW E&M-EST. PATIENT-LVL IV: ICD-10-PCS | Mod: PBBFAC,,, | Performed by: INTERNAL MEDICINE

## 2020-01-17 PROCEDURE — 1159F MED LIST DOCD IN RCRD: CPT | Mod: ,,, | Performed by: INTERNAL MEDICINE

## 2020-01-17 PROCEDURE — 99214 PR OFFICE/OUTPT VISIT, EST, LEVL IV, 30-39 MIN: ICD-10-PCS | Mod: S$PBB,,, | Performed by: INTERNAL MEDICINE

## 2020-01-17 PROCEDURE — 1126F AMNT PAIN NOTED NONE PRSNT: CPT | Mod: ,,, | Performed by: INTERNAL MEDICINE

## 2020-01-17 PROCEDURE — 99214 OFFICE O/P EST MOD 30 MIN: CPT | Mod: S$PBB,,, | Performed by: INTERNAL MEDICINE

## 2020-01-17 PROCEDURE — 99214 OFFICE O/P EST MOD 30 MIN: CPT | Mod: PBBFAC | Performed by: INTERNAL MEDICINE

## 2020-01-17 RX ORDER — FUROSEMIDE 20 MG/1
20 TABLET ORAL DAILY PRN
Qty: 30 TABLET | Refills: 11 | Status: SHIPPED | OUTPATIENT
Start: 2020-01-17 | End: 2021-01-12 | Stop reason: SDUPTHER

## 2020-01-17 NOTE — PROGRESS NOTES
Subjective:    Patient ID:  John E Sandifer is a 74 y.o. male who presents for follow-up of Atrial Fibrillation      HPI     Chonic A-fib - not on OAC due to recurrent GIB, HTN, HLD, NELLY, obesity     Previously saw Dr Gallo 6/6/19  Patient is here for follow-up of chronic atrial fibrillation.  He was remotely seen by Kurt 2013 did not follow-up at that time.  He was recently seen by me in the hospital in April when he was admitted for GI bleed.  He had endoscopy at that time is not taking any oral anticoagulants.  Even off of blood thinners he had issues with bleeding and has been a prohibitive risk.  He was not even cleared for aspirin at that time.  He gets shortness of breath with heavy activity but is unable to do any exercise due to knee pain.  He is wearing a Fatima currently.  He is scheduled for prostate shaving tomorrow.  We discussed that this is a lower risk procedure.  Despite his low functional exercise tolerance he has not had any cardiopulmonary complaints otherwise.  He has continued his beta-blocker which he needs to do perioperatively.  He denies any PND, orthopnea or lower extremity edema.  He denies any dizziness, presyncope or syncope.  He wears CPAP at night and with naps.     Stress test 7/1/19    The perfusion scan is free of evidence from myocardial ischemia or injury.    There is a  moderate intensity fixed defect in the inferior wall of the left ventricle secondary to diaphragm attenuation.    Gated perfusion images showed an ejection fraction of 68 % post stress.    The EKG portion of this study is negative for ischemia.    A-fib was noted throughout the study     Echo 4/10/19  · TDS  · Normal left ventricular systolic function. The estimated ejection fraction is 55%  · Concentric left ventricular remodeling.  · Severe left atrial enlargement.     7/11/19 StableDOE  Denies CP    1/17/20 Stable WEBSTER  Denies CP  Some LE edema  EKG A-fib 63 otherwise ok       Review of Systems    Constitution: Negative for decreased appetite.   HENT: Negative for ear discharge.    Eyes: Negative for blurred vision.   Endocrine: Negative for polyphagia.   Skin: Negative for nail changes.   Genitourinary: Negative for bladder incontinence.   Neurological: Negative for aphonia.   Psychiatric/Behavioral: Negative for hallucinations.   Allergic/Immunologic: Negative for hives.        Objective:    Physical Exam   Constitutional: He is oriented to person, place, and time. He appears well-developed and well-nourished. No distress.   HENT:   Head: Normocephalic and atraumatic.   Eyes: Pupils are equal, round, and reactive to light. Conjunctivae and EOM are normal.   Neck: Normal range of motion. Neck supple. No thyromegaly present.   Cardiovascular: Normal rate, regular rhythm and normal heart sounds.   No murmur heard.  Pulmonary/Chest: Effort normal and breath sounds normal. No respiratory distress. He has no wheezes. He has no rales. He exhibits no tenderness.   Abdominal: Soft. Bowel sounds are normal.   Musculoskeletal: He exhibits edema.   Neurological: He is alert and oriented to person, place, and time.   Skin: Skin is warm and dry.   Psychiatric: He has a normal mood and affect. His behavior is normal.         Assessment:       1. Chronic a-fib    2. HTN, goal below 140/90    3. Pure hypercholesterolemia    4. Panlobular emphysema    5. WEBSTER (dyspnea on exertion)         Plan:       A-fib rates controlled  LE edema likely from norvasc and venous insufficiency - add lasix 20 qd prn  OV 6 months with echo

## 2020-04-09 ENCOUNTER — TELEPHONE (OUTPATIENT)
Dept: UROLOGY | Facility: CLINIC | Age: 75
End: 2020-04-09

## 2020-04-09 ENCOUNTER — OFFICE VISIT (OUTPATIENT)
Dept: UROLOGY | Facility: CLINIC | Age: 75
End: 2020-04-09
Payer: MEDICARE

## 2020-04-09 DIAGNOSIS — R33.9 URINARY RETENTION: Primary | ICD-10-CM

## 2020-04-09 DIAGNOSIS — R31.0 GROSS HEMATURIA: ICD-10-CM

## 2020-04-09 DIAGNOSIS — N40.1 BPH WITH OBSTRUCTION/LOWER URINARY TRACT SYMPTOMS: ICD-10-CM

## 2020-04-09 DIAGNOSIS — N13.8 BPH WITH OBSTRUCTION/LOWER URINARY TRACT SYMPTOMS: ICD-10-CM

## 2020-04-09 PROCEDURE — 99442 PR PHYSICIAN TELEPHONE EVALUATION 11-20 MIN: ICD-10-PCS | Mod: 95,,, | Performed by: UROLOGY

## 2020-04-09 PROCEDURE — 99442 PR PHYSICIAN TELEPHONE EVALUATION 11-20 MIN: CPT | Mod: 95,,, | Performed by: UROLOGY

## 2020-04-09 NOTE — PROGRESS NOTES
Subjective:       John E Sandifer is a 74 y.o. male who is an established patient who was seen for evaluation of BPH.      He is s/p bipolar TURP 6/7/19. He initially presented with UR and gross hematuria. CT uro 4/19 was negative. Cysto/UDS 4/19 performed.     He required CIC for short time after TURP. PVRs then went to 0cc. He remains on Flomax. Denies significant LUTS. +urgency with UUI occasionally - minimally bothersome.     Last PVR 10/19 - 0cc    Virtual audio visit performed today.        The following portions of the patient's history were reviewed and updated as appropriate: allergies, current medications, past family history, past medical history, past social history, past surgical history and problem list.    Review of Systems  Constitutional: no fever or chills  ENT: no nasal congestion or sore throat  Respiratory: no cough or shortness of breath  Cardiovascular: no chest pain or palpitations  Gastrointestinal: no nausea or vomiting, tolerating diet  Genitourinary: as per HPI  Hematologic/Lymphatic: no easy bruising or lymphadenopathy  Musculoskeletal: no arthralgias or myalgias  Skin: no rashes or lesions  Neurological: no seizures or tremors  Behavioral/Psych: no auditory or visual hallucinations       Objective:    Vitals: There were no vitals taken for this visit.    Physical Exam - virtual audio visit    Lab Review   Urine analysis today in clinic shows - no urine     Lab Results   Component Value Date    WBC 9.22 05/31/2019    HGB 12.5 (L) 05/31/2019    HCT 40.1 05/31/2019    MCV 85 05/31/2019     05/31/2019     Lab Results   Component Value Date    CREATININE 1.1 05/31/2019    BUN 14 05/31/2019     No results found for: PSA  No results found for: PSADIAG    Imaging  CT reviewed  Reports and images were personally reviewed by me and discussed with the patient today       Assessment/Plan:      1. Urinary retention    - s/p TURP 6/19   - CIC briefly after TURP   - Voiding well now   -  Flomax     2. BPH with obstruction/lower urinary tract symptoms    - s/p TURP   - Flomax     3. Gross hematuria    - Workup negative       Follow up in 6 months with PVR        The patient location is: home  The chief complaint leading to consultation is: BPH/UR  Visit type: Virtual visit with audio  Total time spent with patient: 15 minutes  Each patient to whom he or she provides medical services by telemedicine is:  (1) informed of the relationship between the physician and patient and the respective role of any other health care provider with respect to management of the patient; and (2) notified that he or she may decline to receive medical services by telemedicine and may withdraw from such care at any time.    Notes: Doing well.

## 2020-04-09 NOTE — TELEPHONE ENCOUNTER
----- Message from Diane Ratliff sent at 4/8/2020  2:24 PM CDT -----  Contact: Self   Type: Patient Call Back    Who called: Self     What is the request in detail:patient is not ready for his virtual visit tomorrow he is not sure how to work the travis. I tried explaining but it didn't help. Please call     Can the clinic reply by [facility]? No     Would the patient rather a call back or a response via [facility]?  Call     Best call back number: [phone]

## 2020-08-18 ENCOUNTER — OFFICE VISIT (OUTPATIENT)
Dept: UROLOGY | Facility: CLINIC | Age: 75
End: 2020-08-18
Payer: MEDICARE

## 2020-08-18 VITALS — WEIGHT: 295.19 LBS | HEIGHT: 68 IN | BODY MASS INDEX: 44.74 KG/M2

## 2020-08-18 DIAGNOSIS — N13.8 BPH WITH OBSTRUCTION/LOWER URINARY TRACT SYMPTOMS: ICD-10-CM

## 2020-08-18 DIAGNOSIS — N40.1 BPH WITH OBSTRUCTION/LOWER URINARY TRACT SYMPTOMS: ICD-10-CM

## 2020-08-18 DIAGNOSIS — R31.0 GROSS HEMATURIA: ICD-10-CM

## 2020-08-18 DIAGNOSIS — Q55.0 ABSENCE OF TESTICLE IN SCROTUM: ICD-10-CM

## 2020-08-18 DIAGNOSIS — R33.9 URINARY RETENTION: Primary | ICD-10-CM

## 2020-08-18 PROCEDURE — 99214 PR OFFICE/OUTPT VISIT, EST, LEVL IV, 30-39 MIN: ICD-10-PCS | Mod: S$PBB,,, | Performed by: UROLOGY

## 2020-08-18 PROCEDURE — 51798 US URINE CAPACITY MEASURE: CPT | Mod: PBBFAC | Performed by: UROLOGY

## 2020-08-18 PROCEDURE — 99214 OFFICE O/P EST MOD 30 MIN: CPT | Mod: S$PBB,,, | Performed by: UROLOGY

## 2020-08-18 PROCEDURE — 81002 URINALYSIS NONAUTO W/O SCOPE: CPT | Mod: PBBFAC | Performed by: UROLOGY

## 2020-08-18 PROCEDURE — 99999 PR PBB SHADOW E&M-EST. PATIENT-LVL III: ICD-10-PCS | Mod: PBBFAC,,, | Performed by: UROLOGY

## 2020-08-18 PROCEDURE — 99999 PR PBB SHADOW E&M-EST. PATIENT-LVL III: CPT | Mod: PBBFAC,,, | Performed by: UROLOGY

## 2020-08-18 PROCEDURE — 99213 OFFICE O/P EST LOW 20 MIN: CPT | Mod: PBBFAC,25 | Performed by: UROLOGY

## 2020-08-18 NOTE — PROGRESS NOTES
"Subjective:       John E Sandifer is a 75 y.o. male who is an established patient who was seen for evaluation of BPH.      He is s/p bipolar TURP 6/7/19. He initially presented with UR and gross hematuria. CT uro 4/19 was negative. Cysto/UDS 4/19 performed.     He required CIC for short time after TURP. PVRs then went to 0cc. He remains on Flomax. Denies significant LUTS. +urgency with UUI occasionally - minimally bothersome.     Last PVR 10/19 - 0cc    8/18/2020  +urgency, otherwise denies LUTS. Medium stream. Reports L testicle is "gone" - had 3 episodes of sharp pain about a year ago and now cannot find L testicle. No prior known UDT.        The following portions of the patient's history were reviewed and updated as appropriate: allergies, current medications, past family history, past medical history, past social history, past surgical history and problem list.    Review of Systems  Constitutional: no fever or chills  ENT: no nasal congestion or sore throat  Respiratory: no cough or shortness of breath  Cardiovascular: no chest pain or palpitations  Gastrointestinal: no nausea or vomiting, tolerating diet  Genitourinary: as per HPI  Hematologic/Lymphatic: no easy bruising or lymphadenopathy  Musculoskeletal: no arthralgias or myalgias  Skin: no rashes or lesions  Neurological: no seizures or tremors  Behavioral/Psych: no auditory or visual hallucinations       Objective:    Vitals: Ht 5' 8" (1.727 m)   Wt 133.9 kg (295 lb 3.1 oz)   BMI 44.88 kg/m²     Physical Exam   General: well developed, well nourished in no acute distress  Head: normocephalic, atraumatic  Neck: supple, trachea midline, no obvious enlargement of thyroid  HEENT: EOMI, mucus membranes moist, sclera anicteric, no hearing impairment  Lungs:  non-labored breathing  Abd:  Obese abdomen  Neuro: alert and oriented x 3, no gross deficits  Psych: normal judgment and insight, normal mood/affect and non-anxious  Genitourinary: circumcised penis, R " testicle normal. L hemiscrotum is fluid filled, unable to palpate testicle. Nontender.       Lab Review   Urine analysis today in clinic shows - no urine     Lab Results   Component Value Date    WBC 9.22 05/31/2019    HGB 12.5 (L) 05/31/2019    HCT 40.1 05/31/2019    MCV 85 05/31/2019     05/31/2019     Lab Results   Component Value Date    CREATININE 1.1 05/31/2019    BUN 14 05/31/2019     No results found for: PSA  No results found for: PSADIAG    Imaging  CT reviewed  Reports and images were personally reviewed by me and discussed with the patient today       Assessment/Plan:      1. Urinary retention    - s/p TURP 6/19   - CIC briefly after TURP   - Voiding well now   - Flomax - okay to trial stopping this.      2. BPH with obstruction/lower urinary tract symptoms    - s/p TURP   - Flomax - trial to stop     3. Gross hematuria    - Workup negative     4. 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 Pt declines. Discussed malignant risk of possible UDT.       Follow up in 12 months with PVR

## 2020-08-19 LAB
BILIRUB SERPL-MCNC: NORMAL MG/DL
BLOOD URINE, POC: NORMAL
CLARITY, POC UA: CLEAR
COLOR, POC UA: YELLOW
GLUCOSE UR QL STRIP: NORMAL
KETONES UR QL STRIP: NORMAL
LEUKOCYTE ESTERASE URINE, POC: NORMAL
NITRITE, POC UA: NORMAL
PH, POC UA: 5
POC RESIDUAL URINE VOLUME: 0 ML (ref 0–100)
PROTEIN, POC: NORMAL
SPECIFIC GRAVITY, POC UA: 1015
UROBILINOGEN, POC UA: NORMAL

## 2020-09-15 NOTE — PROGRESS NOTES
[facility]  Cardiology  Progress Note    Patient Name: John E Sandifer  MRN: [id number]  Admission Date: 4/5/2019  Hospital Length of Stay: 6 days  Code Status: Full Code   Attending Physician: Umang Wharton MD   Primary Care Physician: Umang Wharton MD  Expected Discharge Date:   Principal Problem:Chronic a-fib    Subjective:     Hospital Course:   4-10:  Admitted with AFib with RVR and sepsis    Interval History:  No current complaints.  No acute events per nursing    Telemetry:  AFib rate controlled 60 stay 80s    Review of Systems   All other systems reviewed and are negative.    Objective:     Vital Signs (Most Recent):  Temp: 98.3 °F (36.8 °C) (04/11/19 0751)  Pulse: 81 (04/11/19 0751)  Resp: 17 (04/11/19 0751)  BP: 115/79 (04/11/19 0751)  SpO2: 100 % (04/11/19 0751) Vital Signs (24h Range):  Temp:  [97.9 °F (36.6 °C)-98.6 °F (37 °C)] 98.3 °F (36.8 °C)  Pulse:  [] 81  Resp:  [16-20] 17  SpO2:  [91 %-100 %] 100 %  BP: (109-132)/(58-79) 115/79     Weight: 125.8 kg (277 lb 5.4 oz)  Body mass index is 40.96 kg/m².     SpO2: 100 %  O2 Device (Oxygen Therapy): room air      Intake/Output Summary (Last 24 hours) at 4/11/2019 0859  Last data filed at 4/10/2019 2359  Gross per 24 hour   Intake 240 ml   Output 1000 ml   Net -760 ml       Lines/Drains/Airways     Airway                 Airway - Non-Surgical 04/08/19 1511 Nasal Cannula 2 days          Peripheral Intravenous Line                 Peripheral IV - Single Lumen 04/09/19 1130 Anterior;Right Forearm 1 day                Physical Exam   Constitutional: He appears well-developed and well-nourished. No distress.   HENT:   Head: Normocephalic and atraumatic.   Eyes: Pupils are equal, round, and reactive to light. Conjunctivae and EOM are normal.   Cardiovascular: Normal rate. An irregularly irregular rhythm present.   Pulmonary/Chest: Effort normal and breath sounds normal.   Musculoskeletal: He exhibits no edema.   Neurological: He is  alert.   Skin: Skin is warm and dry.   Psychiatric: He has a normal mood and affect. His behavior is normal.       Significant Labs: BMP: No results for input(s): GLU, NA, K, CL, CO2, BUN, CREATININE, CALCIUM, MG in the last 48 hours. and CBC No results for input(s): WBC, HGB, HCT, PLT in the last 48 hours.    Significant Imaging: Echocardiogram:   Transthoracic echo (TTE) complete (Cupid Only):   Results for orders placed or performed during the hospital encounter of 04/05/19   Transthoracic echo (TTE) 2D with Color Flow   Result Value Ref Range    BSA 2.48 m2    LA WIDTH 5.46 cm    AORTIC VALVE CUSP SEPERATION 1.77 cm    PV PEAK VELOCITY 0.89 cm/s    LVIDD 4.50 3.5 - 6.0 cm    IVS 1.48 (A) 0.6 - 1.1 cm    PW 1.38 (A) 0.6 - 1.1 cm    Ao root annulus 3.91 cm    LVIDS 2.81 2.1 - 4.0 cm    FS 38 28 - 44 %    LA volume 143.89 cm3    Sinus 3.71 cm    STJ 3.33 cm    LV mass 256.49 g    LA size 4.82 cm    RVDD 3.79 cm    TAPSE 1.77 cm    RV S' 8.49 m/s    Left Ventricle Relative Wall Thickness 0.61 cm    AV mean gradient 5.71 mmHg    AV valve area 2.99 cm2    AV Velocity Ratio 0.78     AV index (prosthetic) 0.80     IVRT 0.06 msec    LVOT diameter 2.19 cm    LVOT area 3.76 cm2    LVOT peak bashir 1.97199734 m/s    LVOT peak VTI 22.01 cm    Ao peak bashir 1.49 m/s    Ao VTI 27.68 cm    LVOT stroke volume 82.87 cm3    AV peak gradient 8.88 mmHg    MV Peak E Bashir 0.98 m/s    TR Max Bashir 2.59 m/s    LV Systolic Volume 29.70 mL    LV Systolic Volume Index 12.5 mL/m2    LV Diastolic Volume 92.30 mL    LV Diastolic Volume Index 38.85 mL/m2    LA Volume Index 60.6 mL/m2    LV Mass Index 108.0 g/m2    RA Major Axis 6.20 cm    Left Atrium Minor Axis 5.90 cm    Left Atrium Major Axis 7.07 cm    Triscuspid Valve Regurgitation Peak Gradient 26.83 mmHg    RA Width 4.28 cm    Right Atrial Pressure (from IVC) 3 mmHg    TV rest pulmonary artery pressure 30 mmHg     Assessment and Plan:     Brief HPI:  Heart rate improved after titration of  beta-blocker.  Chronic AFib in no indication/benefit for further intervention    * Chronic a-fib  Has significant increase in heart rate with underlying issues  Increase metoprolol as tolerated by blood pressure  No indication for AAD with chronicity unlikely will improve as he recovers from acute presentation  OAC held with recurrent GI bleed issues  Aspirin would be indicated if cleared by GI    Acute blood loss anemia  Better post transfusion and endoscopy    HTN (hypertension)  Stable    HLD (hyperlipidemia)  On statin    NELLY (obstructive sleep apnea)  Compliance with CPAP      No further recommendations from CV standpoint.  We will see as needed    VTE Risk Mitigation (From admission, onward)        Ordered     IP VTE HIGH RISK PATIENT  Once      04/05/19 1404     Place sequential compression device  Until discontinued      04/05/19 1404          Chico Gallo MD  Cardiology  [facility]   0 = understands/communicates without difficulty

## 2020-11-30 DIAGNOSIS — R33.9 URINARY RETENTION: ICD-10-CM

## 2020-11-30 DIAGNOSIS — N40.1 BPH WITH OBSTRUCTION/LOWER URINARY TRACT SYMPTOMS: ICD-10-CM

## 2020-11-30 DIAGNOSIS — N13.8 BPH WITH OBSTRUCTION/LOWER URINARY TRACT SYMPTOMS: ICD-10-CM

## 2020-11-30 RX ORDER — TAMSULOSIN HYDROCHLORIDE 0.4 MG/1
0.4 CAPSULE ORAL DAILY
Qty: 90 CAPSULE | Refills: 3 | Status: SHIPPED | OUTPATIENT
Start: 2020-11-30 | End: 2021-08-23 | Stop reason: ALTCHOICE

## 2021-01-12 RX ORDER — FUROSEMIDE 20 MG/1
20 TABLET ORAL DAILY PRN
Qty: 30 TABLET | Refills: 11 | Status: SHIPPED | OUTPATIENT
Start: 2021-01-12 | End: 2021-01-13 | Stop reason: SDUPTHER

## 2021-01-13 RX ORDER — FUROSEMIDE 20 MG/1
20 TABLET ORAL DAILY PRN
Qty: 30 TABLET | Refills: 11 | Status: SHIPPED | OUTPATIENT
Start: 2021-01-13 | End: 2021-11-22

## 2021-04-16 ENCOUNTER — PATIENT MESSAGE (OUTPATIENT)
Dept: RESEARCH | Facility: HOSPITAL | Age: 76
End: 2021-04-16

## 2021-07-30 ENCOUNTER — HOSPITAL ENCOUNTER (INPATIENT)
Facility: HOSPITAL | Age: 76
LOS: 3 days | Discharge: HOME | DRG: 190 | End: 2021-08-02
Attending: EMERGENCY MEDICINE | Admitting: EMERGENCY MEDICINE
Payer: MEDICARE

## 2021-07-30 DIAGNOSIS — R09.02 HYPOXIA: ICD-10-CM

## 2021-07-30 DIAGNOSIS — Z20.822 SUSPECTED COVID-19 VIRUS INFECTION: ICD-10-CM

## 2021-07-30 DIAGNOSIS — I48.91 ATRIAL FIBRILLATION WITH RVR: ICD-10-CM

## 2021-07-30 DIAGNOSIS — J44.1 COPD WITH ACUTE EXACERBATION: Primary | ICD-10-CM

## 2021-07-30 LAB
ALBUMIN SERPL BCP-MCNC: 3.8 G/DL (ref 3.5–5.2)
ALLENS TEST: ABNORMAL
ALP SERPL-CCNC: 75 U/L (ref 55–135)
ALT SERPL W/O P-5'-P-CCNC: 21 U/L (ref 10–44)
ANION GAP SERPL CALC-SCNC: 9 MMOL/L (ref 8–16)
AST SERPL-CCNC: 29 U/L (ref 10–40)
BACTERIA #/AREA URNS HPF: ABNORMAL /HPF
BASOPHILS # BLD AUTO: 0.05 K/UL (ref 0–0.2)
BASOPHILS NFR BLD: 0.6 % (ref 0–1.9)
BILIRUB SERPL-MCNC: 1.6 MG/DL (ref 0.1–1)
BILIRUB UR QL STRIP: NEGATIVE
BNP SERPL-MCNC: 175 PG/ML (ref 0–99)
BUN SERPL-MCNC: 11 MG/DL (ref 8–23)
CALCIUM SERPL-MCNC: 10.4 MG/DL (ref 8.7–10.5)
CHLORIDE SERPL-SCNC: 100 MMOL/L (ref 95–110)
CK SERPL-CCNC: 165 U/L (ref 20–200)
CLARITY UR: CLEAR
CO2 SERPL-SCNC: 26 MMOL/L (ref 23–29)
COLOR UR: YELLOW
CREAT SERPL-MCNC: 1.1 MG/DL (ref 0.5–1.4)
CRP SERPL-MCNC: 74.5 MG/L (ref 0–8.2)
CTP QC/QA: YES
CTP QC/QA: YES
DELSYS: ABNORMAL
DIFFERENTIAL METHOD: ABNORMAL
EOSINOPHIL # BLD AUTO: 0.1 K/UL (ref 0–0.5)
EOSINOPHIL NFR BLD: 1.3 % (ref 0–8)
ERYTHROCYTE [DISTWIDTH] IN BLOOD BY AUTOMATED COUNT: 15.7 % (ref 11.5–14.5)
EST. GFR  (AFRICAN AMERICAN): >60 ML/MIN/1.73 M^2
EST. GFR  (NON AFRICAN AMERICAN): >60 ML/MIN/1.73 M^2
FERRITIN SERPL-MCNC: 85 NG/ML (ref 20–300)
FLOW: 3
GLUCOSE SERPL-MCNC: 107 MG/DL (ref 70–110)
GLUCOSE UR QL STRIP: NEGATIVE
HCO3 UR-SCNC: 30.1 MMOL/L (ref 24–28)
HCT VFR BLD AUTO: 46 % (ref 40–54)
HGB BLD-MCNC: 15.4 G/DL (ref 14–18)
HGB UR QL STRIP: ABNORMAL
HYALINE CASTS #/AREA URNS LPF: 4 /LPF
IMM GRANULOCYTES # BLD AUTO: 0.07 K/UL (ref 0–0.04)
IMM GRANULOCYTES NFR BLD AUTO: 0.8 % (ref 0–0.5)
KETONES UR QL STRIP: NEGATIVE
LACTATE SERPL-SCNC: 1.3 MMOL/L (ref 0.5–2.2)
LDH SERPL L TO P-CCNC: 151 U/L (ref 110–260)
LEUKOCYTE ESTERASE UR QL STRIP: NEGATIVE
LYMPHOCYTES # BLD AUTO: 0.8 K/UL (ref 1–4.8)
LYMPHOCYTES NFR BLD: 9.8 % (ref 18–48)
MCH RBC QN AUTO: 27.5 PG (ref 27–31)
MCHC RBC AUTO-ENTMCNC: 33.5 G/DL (ref 32–36)
MCV RBC AUTO: 82 FL (ref 82–98)
MICROSCOPIC COMMENT: ABNORMAL
MODE: ABNORMAL
MONOCYTES # BLD AUTO: 1.1 K/UL (ref 0.3–1)
MONOCYTES NFR BLD: 13 % (ref 4–15)
NEUTROPHILS # BLD AUTO: 6.2 K/UL (ref 1.8–7.7)
NEUTROPHILS NFR BLD: 74.5 % (ref 38–73)
NITRITE UR QL STRIP: NEGATIVE
NRBC BLD-RTO: 0 /100 WBC
PCO2 BLDA: 46.6 MMHG (ref 35–45)
PH SMN: 7.42 [PH] (ref 7.35–7.45)
PH UR STRIP: 6 [PH] (ref 5–8)
PLATELET # BLD AUTO: 130 K/UL (ref 150–450)
PMV BLD AUTO: 11.9 FL (ref 9.2–12.9)
PO2 BLDA: 121 MMHG (ref 80–100)
POC BE: 5 MMOL/L
POC MOLECULAR INFLUENZA A AGN: NEGATIVE
POC MOLECULAR INFLUENZA B AGN: NEGATIVE
POC SATURATED O2: 99 % (ref 95–100)
POC TCO2: 32 MMOL/L (ref 23–27)
POTASSIUM SERPL-SCNC: 4.3 MMOL/L (ref 3.5–5.1)
PROCALCITONIN SERPL IA-MCNC: 0.08 NG/ML
PROT SERPL-MCNC: 8 G/DL (ref 6–8.4)
PROT UR QL STRIP: ABNORMAL
RBC # BLD AUTO: 5.59 M/UL (ref 4.6–6.2)
RBC #/AREA URNS HPF: 0 /HPF (ref 0–4)
SAMPLE: ABNORMAL
SARS-COV-2 RDRP RESP QL NAA+PROBE: NEGATIVE
SITE: ABNORMAL
SODIUM SERPL-SCNC: 135 MMOL/L (ref 136–145)
SP GR UR STRIP: 1.01 (ref 1–1.03)
SP02: 97
TROPONIN I SERPL DL<=0.01 NG/ML-MCNC: <0.006 NG/ML (ref 0–0.03)
URN SPEC COLLECT METH UR: ABNORMAL
UROBILINOGEN UR STRIP-ACNC: NEGATIVE EU/DL
WBC # BLD AUTO: 8.36 K/UL (ref 3.9–12.7)
WBC #/AREA URNS HPF: 0 /HPF (ref 0–5)

## 2021-07-30 PROCEDURE — 81000 URINALYSIS NONAUTO W/SCOPE: CPT | Performed by: STUDENT IN AN ORGANIZED HEALTH CARE EDUCATION/TRAINING PROGRAM

## 2021-07-30 PROCEDURE — 93010 EKG 12-LEAD: ICD-10-PCS | Mod: ,,, | Performed by: INTERNAL MEDICINE

## 2021-07-30 PROCEDURE — 93005 ELECTROCARDIOGRAM TRACING: CPT

## 2021-07-30 PROCEDURE — 80053 COMPREHEN METABOLIC PANEL: CPT | Performed by: EMERGENCY MEDICINE

## 2021-07-30 PROCEDURE — 83615 LACTATE (LD) (LDH) ENZYME: CPT | Performed by: EMERGENCY MEDICINE

## 2021-07-30 PROCEDURE — 85025 COMPLETE CBC W/AUTO DIFF WBC: CPT | Performed by: EMERGENCY MEDICINE

## 2021-07-30 PROCEDURE — 25000242 PHARM REV CODE 250 ALT 637 W/ HCPCS: Performed by: STUDENT IN AN ORGANIZED HEALTH CARE EDUCATION/TRAINING PROGRAM

## 2021-07-30 PROCEDURE — 36600 WITHDRAWAL OF ARTERIAL BLOOD: CPT

## 2021-07-30 PROCEDURE — 82728 ASSAY OF FERRITIN: CPT | Performed by: EMERGENCY MEDICINE

## 2021-07-30 PROCEDURE — 87502 INFLUENZA DNA AMP PROBE: CPT

## 2021-07-30 PROCEDURE — 25000003 PHARM REV CODE 250: Performed by: STUDENT IN AN ORGANIZED HEALTH CARE EDUCATION/TRAINING PROGRAM

## 2021-07-30 PROCEDURE — 94761 N-INVAS EAR/PLS OXIMETRY MLT: CPT

## 2021-07-30 PROCEDURE — U0002 COVID-19 LAB TEST NON-CDC: HCPCS | Performed by: EMERGENCY MEDICINE

## 2021-07-30 PROCEDURE — 86140 C-REACTIVE PROTEIN: CPT | Performed by: EMERGENCY MEDICINE

## 2021-07-30 PROCEDURE — 94640 AIRWAY INHALATION TREATMENT: CPT

## 2021-07-30 PROCEDURE — 84484 ASSAY OF TROPONIN QUANT: CPT | Performed by: EMERGENCY MEDICINE

## 2021-07-30 PROCEDURE — 94660 CPAP INITIATION&MGMT: CPT

## 2021-07-30 PROCEDURE — 93010 ELECTROCARDIOGRAM REPORT: CPT | Mod: ,,, | Performed by: INTERNAL MEDICINE

## 2021-07-30 PROCEDURE — 83605 ASSAY OF LACTIC ACID: CPT | Performed by: EMERGENCY MEDICINE

## 2021-07-30 PROCEDURE — 87040 BLOOD CULTURE FOR BACTERIA: CPT | Performed by: EMERGENCY MEDICINE

## 2021-07-30 PROCEDURE — 99285 EMERGENCY DEPT VISIT HI MDM: CPT | Mod: 25

## 2021-07-30 PROCEDURE — 12000002 HC ACUTE/MED SURGE SEMI-PRIVATE ROOM

## 2021-07-30 PROCEDURE — 99900035 HC TECH TIME PER 15 MIN (STAT)

## 2021-07-30 PROCEDURE — 82803 BLOOD GASES ANY COMBINATION: CPT

## 2021-07-30 PROCEDURE — 83880 ASSAY OF NATRIURETIC PEPTIDE: CPT | Performed by: EMERGENCY MEDICINE

## 2021-07-30 PROCEDURE — 82550 ASSAY OF CK (CPK): CPT | Performed by: EMERGENCY MEDICINE

## 2021-07-30 PROCEDURE — 27000221 HC OXYGEN, UP TO 24 HOURS

## 2021-07-30 PROCEDURE — 63600175 PHARM REV CODE 636 W HCPCS: Performed by: STUDENT IN AN ORGANIZED HEALTH CARE EDUCATION/TRAINING PROGRAM

## 2021-07-30 PROCEDURE — 27000190 HC CPAP FULL FACE MASK W/VALVE

## 2021-07-30 PROCEDURE — 84145 PROCALCITONIN (PCT): CPT | Performed by: EMERGENCY MEDICINE

## 2021-07-30 PROCEDURE — 96374 THER/PROPH/DIAG INJ IV PUSH: CPT

## 2021-07-30 PROCEDURE — 96375 TX/PRO/DX INJ NEW DRUG ADDON: CPT

## 2021-07-30 RX ORDER — GABAPENTIN 400 MG/1
400 CAPSULE ORAL NIGHTLY
Status: DISCONTINUED | OUTPATIENT
Start: 2021-07-30 | End: 2021-08-02 | Stop reason: HOSPADM

## 2021-07-30 RX ORDER — MUPIROCIN 20 MG/G
OINTMENT TOPICAL 2 TIMES DAILY
Status: DISCONTINUED | OUTPATIENT
Start: 2021-07-30 | End: 2021-08-02 | Stop reason: HOSPADM

## 2021-07-30 RX ORDER — ALBUTEROL SULFATE 90 UG/1
2 AEROSOL, METERED RESPIRATORY (INHALATION) EVERY 6 HOURS PRN
Status: DISCONTINUED | OUTPATIENT
Start: 2021-07-30 | End: 2021-07-30 | Stop reason: ALTCHOICE

## 2021-07-30 RX ORDER — PRAVASTATIN SODIUM 40 MG/1
40 TABLET ORAL DAILY
Status: DISCONTINUED | OUTPATIENT
Start: 2021-07-31 | End: 2021-08-02 | Stop reason: HOSPADM

## 2021-07-30 RX ORDER — AZITHROMYCIN 250 MG/1
500 TABLET, FILM COATED ORAL EVERY 24 HOURS
Status: DISCONTINUED | OUTPATIENT
Start: 2021-07-31 | End: 2021-08-02 | Stop reason: HOSPADM

## 2021-07-30 RX ORDER — FLUTICASONE FUROATE AND VILANTEROL 100; 25 UG/1; UG/1
1 POWDER RESPIRATORY (INHALATION) DAILY
Refills: 3 | Status: DISCONTINUED | OUTPATIENT
Start: 2021-07-31 | End: 2021-07-30

## 2021-07-30 RX ORDER — METOPROLOL TARTRATE 50 MG/1
50 TABLET ORAL 2 TIMES DAILY
Status: DISCONTINUED | OUTPATIENT
Start: 2021-07-30 | End: 2021-08-02 | Stop reason: HOSPADM

## 2021-07-30 RX ORDER — ALBUTEROL SULFATE 2.5 MG/.5ML
2.5 SOLUTION RESPIRATORY (INHALATION)
Status: COMPLETED | OUTPATIENT
Start: 2021-07-30 | End: 2021-07-30

## 2021-07-30 RX ORDER — FLUTICASONE FUROATE AND VILANTEROL 100; 25 UG/1; UG/1
1 POWDER RESPIRATORY (INHALATION) DAILY
Status: DISCONTINUED | OUTPATIENT
Start: 2021-07-31 | End: 2021-08-02 | Stop reason: HOSPADM

## 2021-07-30 RX ORDER — DILTIAZEM HYDROCHLORIDE 5 MG/ML
20 INJECTION INTRAVENOUS
Status: COMPLETED | OUTPATIENT
Start: 2021-07-30 | End: 2021-07-30

## 2021-07-30 RX ORDER — IPRATROPIUM BROMIDE 0.5 MG/2.5ML
0.5 SOLUTION RESPIRATORY (INHALATION) EVERY 6 HOURS
Status: DISCONTINUED | OUTPATIENT
Start: 2021-07-30 | End: 2021-07-31

## 2021-07-30 RX ORDER — ALBUTEROL SULFATE 90 UG/1
4 AEROSOL, METERED RESPIRATORY (INHALATION)
Status: COMPLETED | OUTPATIENT
Start: 2021-07-30 | End: 2021-07-30

## 2021-07-30 RX ORDER — TAMSULOSIN HYDROCHLORIDE 0.4 MG/1
0.4 CAPSULE ORAL DAILY
Status: DISCONTINUED | OUTPATIENT
Start: 2021-07-31 | End: 2021-08-02 | Stop reason: HOSPADM

## 2021-07-30 RX ORDER — DEXAMETHASONE SODIUM PHOSPHATE 4 MG/ML
6 INJECTION, SOLUTION INTRA-ARTICULAR; INTRALESIONAL; INTRAMUSCULAR; INTRAVENOUS; SOFT TISSUE
Status: COMPLETED | OUTPATIENT
Start: 2021-07-30 | End: 2021-07-30

## 2021-07-30 RX ORDER — FLUTICASONE FUROATE AND VILANTEROL 100; 25 UG/1; UG/1
1 POWDER RESPIRATORY (INHALATION) DAILY
Status: DISCONTINUED | OUTPATIENT
Start: 2021-07-30 | End: 2021-07-30 | Stop reason: CLARIF

## 2021-07-30 RX ORDER — ALBUTEROL SULFATE 2.5 MG/.5ML
2.5 SOLUTION RESPIRATORY (INHALATION) EVERY 6 HOURS PRN
Status: DISCONTINUED | OUTPATIENT
Start: 2021-07-30 | End: 2021-08-02 | Stop reason: HOSPADM

## 2021-07-30 RX ORDER — IPRATROPIUM BROMIDE AND ALBUTEROL SULFATE 2.5; .5 MG/3ML; MG/3ML
3 SOLUTION RESPIRATORY (INHALATION)
Status: COMPLETED | OUTPATIENT
Start: 2021-07-30 | End: 2021-07-30

## 2021-07-30 RX ORDER — SODIUM CHLORIDE 0.9 % (FLUSH) 0.9 %
3 SYRINGE (ML) INJECTION
Status: DISCONTINUED | OUTPATIENT
Start: 2021-07-30 | End: 2021-08-02 | Stop reason: HOSPADM

## 2021-07-30 RX ORDER — ENOXAPARIN SODIUM 100 MG/ML
40 INJECTION SUBCUTANEOUS EVERY 24 HOURS
Status: DISCONTINUED | OUTPATIENT
Start: 2021-07-30 | End: 2021-08-02 | Stop reason: HOSPADM

## 2021-07-30 RX ORDER — LOSARTAN POTASSIUM 25 MG/1
100 TABLET ORAL DAILY
Status: DISCONTINUED | OUTPATIENT
Start: 2021-07-31 | End: 2021-08-02 | Stop reason: HOSPADM

## 2021-07-30 RX ORDER — ALLOPURINOL 100 MG/1
100 TABLET ORAL DAILY
Status: DISCONTINUED | OUTPATIENT
Start: 2021-07-31 | End: 2021-08-02 | Stop reason: HOSPADM

## 2021-07-30 RX ORDER — AMLODIPINE BESYLATE 5 MG/1
10 TABLET ORAL DAILY
Status: DISCONTINUED | OUTPATIENT
Start: 2021-07-31 | End: 2021-08-02 | Stop reason: HOSPADM

## 2021-07-30 RX ORDER — PANTOPRAZOLE SODIUM 40 MG/1
40 TABLET, DELAYED RELEASE ORAL DAILY
Refills: 3 | Status: DISCONTINUED | OUTPATIENT
Start: 2021-07-31 | End: 2021-08-02 | Stop reason: HOSPADM

## 2021-07-30 RX ADMIN — ENOXAPARIN SODIUM 40 MG: 100 INJECTION SUBCUTANEOUS at 05:07

## 2021-07-30 RX ADMIN — IPRATROPIUM BROMIDE AND ALBUTEROL SULFATE 3 ML: .5; 3 SOLUTION RESPIRATORY (INHALATION) at 12:07

## 2021-07-30 RX ADMIN — ALBUTEROL SULFATE 2.5 MG: 2.5 SOLUTION RESPIRATORY (INHALATION) at 12:07

## 2021-07-30 RX ADMIN — MUPIROCIN: 20 OINTMENT TOPICAL at 10:07

## 2021-07-30 RX ADMIN — ALBUTEROL SULFATE 2.5 MG: 2.5 SOLUTION RESPIRATORY (INHALATION) at 10:07

## 2021-07-30 RX ADMIN — METOPROLOL TARTRATE 50 MG: 50 TABLET, FILM COATED ORAL at 10:07

## 2021-07-30 RX ADMIN — GABAPENTIN 400 MG: 400 CAPSULE ORAL at 10:07

## 2021-07-30 RX ADMIN — DEXAMETHASONE SODIUM PHOSPHATE 6 MG: 4 INJECTION INTRA-ARTICULAR; INTRALESIONAL; INTRAMUSCULAR; INTRAVENOUS; SOFT TISSUE at 02:07

## 2021-07-30 RX ADMIN — ALBUTEROL SULFATE 4 PUFF: 90 AEROSOL, METERED RESPIRATORY (INHALATION) at 04:07

## 2021-07-30 RX ADMIN — DILTIAZEM HYDROCHLORIDE 20 MG: 5 INJECTION INTRAVENOUS at 02:07

## 2021-07-31 LAB
ANION GAP SERPL CALC-SCNC: 10 MMOL/L (ref 8–16)
BASOPHILS # BLD AUTO: 0.01 K/UL (ref 0–0.2)
BASOPHILS NFR BLD: 0.2 % (ref 0–1.9)
BUN SERPL-MCNC: 12 MG/DL (ref 8–23)
CALCIUM SERPL-MCNC: 10.3 MG/DL (ref 8.7–10.5)
CHLORIDE SERPL-SCNC: 102 MMOL/L (ref 95–110)
CO2 SERPL-SCNC: 25 MMOL/L (ref 23–29)
CREAT SERPL-MCNC: 0.9 MG/DL (ref 0.5–1.4)
DIFFERENTIAL METHOD: ABNORMAL
EOSINOPHIL # BLD AUTO: 0 K/UL (ref 0–0.5)
EOSINOPHIL NFR BLD: 0 % (ref 0–8)
ERYTHROCYTE [DISTWIDTH] IN BLOOD BY AUTOMATED COUNT: 15.7 % (ref 11.5–14.5)
EST. GFR  (AFRICAN AMERICAN): >60 ML/MIN/1.73 M^2
EST. GFR  (NON AFRICAN AMERICAN): >60 ML/MIN/1.73 M^2
GLUCOSE SERPL-MCNC: 164 MG/DL (ref 70–110)
HCT VFR BLD AUTO: 46.9 % (ref 40–54)
HGB BLD-MCNC: 15 G/DL (ref 14–18)
IMM GRANULOCYTES # BLD AUTO: 0.04 K/UL (ref 0–0.04)
IMM GRANULOCYTES NFR BLD AUTO: 0.9 % (ref 0–0.5)
LYMPHOCYTES # BLD AUTO: 0.4 K/UL (ref 1–4.8)
LYMPHOCYTES NFR BLD: 9.3 % (ref 18–48)
MAGNESIUM SERPL-MCNC: 2.3 MG/DL (ref 1.6–2.6)
MCH RBC QN AUTO: 27.2 PG (ref 27–31)
MCHC RBC AUTO-ENTMCNC: 32 G/DL (ref 32–36)
MCV RBC AUTO: 85 FL (ref 82–98)
MONOCYTES # BLD AUTO: 0.4 K/UL (ref 0.3–1)
MONOCYTES NFR BLD: 9.5 % (ref 4–15)
NEUTROPHILS # BLD AUTO: 3.6 K/UL (ref 1.8–7.7)
NEUTROPHILS NFR BLD: 80.1 % (ref 38–73)
NRBC BLD-RTO: 0 /100 WBC
PHOSPHATE SERPL-MCNC: 2.9 MG/DL (ref 2.7–4.5)
PLATELET # BLD AUTO: 115 K/UL (ref 150–450)
PMV BLD AUTO: 11.8 FL (ref 9.2–12.9)
POTASSIUM SERPL-SCNC: 4.3 MMOL/L (ref 3.5–5.1)
RBC # BLD AUTO: 5.51 M/UL (ref 4.6–6.2)
SODIUM SERPL-SCNC: 137 MMOL/L (ref 136–145)
WBC # BLD AUTO: 4.54 K/UL (ref 3.9–12.7)

## 2021-07-31 PROCEDURE — U0003 INFECTIOUS AGENT DETECTION BY NUCLEIC ACID (DNA OR RNA); SEVERE ACUTE RESPIRATORY SYNDROME CORONAVIRUS 2 (SARS-COV-2) (CORONAVIRUS DISEASE [COVID-19]), AMPLIFIED PROBE TECHNIQUE, MAKING USE OF HIGH THROUGHPUT TECHNOLOGIES AS DESCRIBED BY CMS-2020-01-R: HCPCS | Performed by: STUDENT IN AN ORGANIZED HEALTH CARE EDUCATION/TRAINING PROGRAM

## 2021-07-31 PROCEDURE — 25000242 PHARM REV CODE 250 ALT 637 W/ HCPCS: Performed by: STUDENT IN AN ORGANIZED HEALTH CARE EDUCATION/TRAINING PROGRAM

## 2021-07-31 PROCEDURE — 85025 COMPLETE CBC W/AUTO DIFF WBC: CPT | Performed by: STUDENT IN AN ORGANIZED HEALTH CARE EDUCATION/TRAINING PROGRAM

## 2021-07-31 PROCEDURE — 11000001 HC ACUTE MED/SURG PRIVATE ROOM

## 2021-07-31 PROCEDURE — 63700000 PHARM REV CODE 250 ALT 637 W/O HCPCS: Performed by: STUDENT IN AN ORGANIZED HEALTH CARE EDUCATION/TRAINING PROGRAM

## 2021-07-31 PROCEDURE — 84100 ASSAY OF PHOSPHORUS: CPT | Performed by: STUDENT IN AN ORGANIZED HEALTH CARE EDUCATION/TRAINING PROGRAM

## 2021-07-31 PROCEDURE — 63600175 PHARM REV CODE 636 W HCPCS: Performed by: STUDENT IN AN ORGANIZED HEALTH CARE EDUCATION/TRAINING PROGRAM

## 2021-07-31 PROCEDURE — 25000003 PHARM REV CODE 250: Performed by: STUDENT IN AN ORGANIZED HEALTH CARE EDUCATION/TRAINING PROGRAM

## 2021-07-31 PROCEDURE — 94640 AIRWAY INHALATION TREATMENT: CPT

## 2021-07-31 PROCEDURE — 27000221 HC OXYGEN, UP TO 24 HOURS

## 2021-07-31 PROCEDURE — 80048 BASIC METABOLIC PNL TOTAL CA: CPT | Performed by: STUDENT IN AN ORGANIZED HEALTH CARE EDUCATION/TRAINING PROGRAM

## 2021-07-31 PROCEDURE — 94761 N-INVAS EAR/PLS OXIMETRY MLT: CPT

## 2021-07-31 PROCEDURE — 83735 ASSAY OF MAGNESIUM: CPT | Performed by: STUDENT IN AN ORGANIZED HEALTH CARE EDUCATION/TRAINING PROGRAM

## 2021-07-31 PROCEDURE — U0005 INFEC AGEN DETEC AMPLI PROBE: HCPCS | Performed by: STUDENT IN AN ORGANIZED HEALTH CARE EDUCATION/TRAINING PROGRAM

## 2021-07-31 RX ADMIN — METOPROLOL TARTRATE 50 MG: 50 TABLET, FILM COATED ORAL at 09:07

## 2021-07-31 RX ADMIN — PRAVASTATIN SODIUM 40 MG: 40 TABLET ORAL at 09:07

## 2021-07-31 RX ADMIN — AZITHROMYCIN MONOHYDRATE 500 MG: 250 TABLET ORAL at 09:07

## 2021-07-31 RX ADMIN — IPRATROPIUM BROMIDE 0.5 MG: 0.5 SOLUTION RESPIRATORY (INHALATION) at 12:07

## 2021-07-31 RX ADMIN — MUPIROCIN: 20 OINTMENT TOPICAL at 09:07

## 2021-07-31 RX ADMIN — FLUTICASONE FUROATE AND VILANTEROL TRIFENATATE 1 PUFF: 100; 25 POWDER RESPIRATORY (INHALATION) at 10:07

## 2021-07-31 RX ADMIN — CEFTRIAXONE 1 G: 1 INJECTION, SOLUTION INTRAVENOUS at 01:07

## 2021-07-31 RX ADMIN — AMLODIPINE BESYLATE 10 MG: 5 TABLET ORAL at 09:07

## 2021-07-31 RX ADMIN — GABAPENTIN 400 MG: 400 CAPSULE ORAL at 08:07

## 2021-07-31 RX ADMIN — METHYLPREDNISOLONE SODIUM SUCCINATE 40 MG: 40 INJECTION, POWDER, FOR SOLUTION INTRAMUSCULAR; INTRAVENOUS at 04:07

## 2021-07-31 RX ADMIN — ENOXAPARIN SODIUM 40 MG: 100 INJECTION SUBCUTANEOUS at 04:07

## 2021-07-31 RX ADMIN — METOPROLOL TARTRATE 50 MG: 50 TABLET, FILM COATED ORAL at 08:07

## 2021-07-31 RX ADMIN — TAMSULOSIN HYDROCHLORIDE 0.4 MG: 0.4 CAPSULE ORAL at 09:07

## 2021-07-31 RX ADMIN — ALLOPURINOL 100 MG: 100 TABLET ORAL at 09:07

## 2021-07-31 RX ADMIN — LOSARTAN POTASSIUM 100 MG: 25 TABLET, FILM COATED ORAL at 09:07

## 2021-07-31 RX ADMIN — PANTOPRAZOLE SODIUM 40 MG: 40 TABLET, DELAYED RELEASE ORAL at 09:07

## 2021-07-31 RX ADMIN — DEXAMETHASONE 6 MG: 4 TABLET ORAL at 09:07

## 2021-07-31 RX ADMIN — CEFTRIAXONE 1 G: 1 INJECTION, SOLUTION INTRAVENOUS at 11:07

## 2021-08-01 LAB
ANION GAP SERPL CALC-SCNC: 9 MMOL/L (ref 8–16)
BASOPHILS # BLD AUTO: 0.01 K/UL (ref 0–0.2)
BASOPHILS NFR BLD: 0.1 % (ref 0–1.9)
BUN SERPL-MCNC: 20 MG/DL (ref 8–23)
CALCIUM SERPL-MCNC: 10.3 MG/DL (ref 8.7–10.5)
CHLORIDE SERPL-SCNC: 101 MMOL/L (ref 95–110)
CO2 SERPL-SCNC: 27 MMOL/L (ref 23–29)
CREAT SERPL-MCNC: 0.9 MG/DL (ref 0.5–1.4)
DIFFERENTIAL METHOD: ABNORMAL
EOSINOPHIL # BLD AUTO: 0 K/UL (ref 0–0.5)
EOSINOPHIL NFR BLD: 0.1 % (ref 0–8)
ERYTHROCYTE [DISTWIDTH] IN BLOOD BY AUTOMATED COUNT: 15.5 % (ref 11.5–14.5)
EST. GFR  (AFRICAN AMERICAN): >60 ML/MIN/1.73 M^2
EST. GFR  (NON AFRICAN AMERICAN): >60 ML/MIN/1.73 M^2
GLUCOSE SERPL-MCNC: 132 MG/DL (ref 70–110)
HCT VFR BLD AUTO: 46.9 % (ref 40–54)
HGB BLD-MCNC: 15.1 G/DL (ref 14–18)
IMM GRANULOCYTES # BLD AUTO: 0.07 K/UL (ref 0–0.04)
IMM GRANULOCYTES NFR BLD AUTO: 0.9 % (ref 0–0.5)
LYMPHOCYTES # BLD AUTO: 0.6 K/UL (ref 1–4.8)
LYMPHOCYTES NFR BLD: 8 % (ref 18–48)
MAGNESIUM SERPL-MCNC: 2.5 MG/DL (ref 1.6–2.6)
MCH RBC QN AUTO: 27.6 PG (ref 27–31)
MCHC RBC AUTO-ENTMCNC: 32.2 G/DL (ref 32–36)
MCV RBC AUTO: 86 FL (ref 82–98)
MONOCYTES # BLD AUTO: 0.5 K/UL (ref 0.3–1)
MONOCYTES NFR BLD: 6.2 % (ref 4–15)
NEUTROPHILS # BLD AUTO: 6.7 K/UL (ref 1.8–7.7)
NEUTROPHILS NFR BLD: 84.7 % (ref 38–73)
NRBC BLD-RTO: 0 /100 WBC
PHOSPHATE SERPL-MCNC: 2.9 MG/DL (ref 2.7–4.5)
PLATELET # BLD AUTO: 141 K/UL (ref 150–450)
PMV BLD AUTO: 12.7 FL (ref 9.2–12.9)
POTASSIUM SERPL-SCNC: 4.4 MMOL/L (ref 3.5–5.1)
RBC # BLD AUTO: 5.48 M/UL (ref 4.6–6.2)
SODIUM SERPL-SCNC: 137 MMOL/L (ref 136–145)
WBC # BLD AUTO: 7.88 K/UL (ref 3.9–12.7)

## 2021-08-01 PROCEDURE — 94761 N-INVAS EAR/PLS OXIMETRY MLT: CPT

## 2021-08-01 PROCEDURE — 94640 AIRWAY INHALATION TREATMENT: CPT

## 2021-08-01 PROCEDURE — 94660 CPAP INITIATION&MGMT: CPT

## 2021-08-01 PROCEDURE — 63700000 PHARM REV CODE 250 ALT 637 W/O HCPCS: Performed by: STUDENT IN AN ORGANIZED HEALTH CARE EDUCATION/TRAINING PROGRAM

## 2021-08-01 PROCEDURE — 84100 ASSAY OF PHOSPHORUS: CPT | Performed by: STUDENT IN AN ORGANIZED HEALTH CARE EDUCATION/TRAINING PROGRAM

## 2021-08-01 PROCEDURE — 83735 ASSAY OF MAGNESIUM: CPT | Performed by: STUDENT IN AN ORGANIZED HEALTH CARE EDUCATION/TRAINING PROGRAM

## 2021-08-01 PROCEDURE — 25000003 PHARM REV CODE 250: Performed by: STUDENT IN AN ORGANIZED HEALTH CARE EDUCATION/TRAINING PROGRAM

## 2021-08-01 PROCEDURE — 85025 COMPLETE CBC W/AUTO DIFF WBC: CPT | Performed by: STUDENT IN AN ORGANIZED HEALTH CARE EDUCATION/TRAINING PROGRAM

## 2021-08-01 PROCEDURE — 36415 COLL VENOUS BLD VENIPUNCTURE: CPT | Performed by: STUDENT IN AN ORGANIZED HEALTH CARE EDUCATION/TRAINING PROGRAM

## 2021-08-01 PROCEDURE — 63600175 PHARM REV CODE 636 W HCPCS: Performed by: STUDENT IN AN ORGANIZED HEALTH CARE EDUCATION/TRAINING PROGRAM

## 2021-08-01 PROCEDURE — 80048 BASIC METABOLIC PNL TOTAL CA: CPT | Performed by: STUDENT IN AN ORGANIZED HEALTH CARE EDUCATION/TRAINING PROGRAM

## 2021-08-01 PROCEDURE — 27000221 HC OXYGEN, UP TO 24 HOURS

## 2021-08-01 PROCEDURE — 11000001 HC ACUTE MED/SURG PRIVATE ROOM

## 2021-08-01 PROCEDURE — 99900035 HC TECH TIME PER 15 MIN (STAT)

## 2021-08-01 PROCEDURE — 25000242 PHARM REV CODE 250 ALT 637 W/ HCPCS: Performed by: STUDENT IN AN ORGANIZED HEALTH CARE EDUCATION/TRAINING PROGRAM

## 2021-08-01 RX ORDER — PREDNISONE 20 MG/1
40 TABLET ORAL DAILY
Status: DISCONTINUED | OUTPATIENT
Start: 2021-08-01 | End: 2021-08-02 | Stop reason: HOSPADM

## 2021-08-01 RX ADMIN — MUPIROCIN: 20 OINTMENT TOPICAL at 08:08

## 2021-08-01 RX ADMIN — AZITHROMYCIN MONOHYDRATE 500 MG: 250 TABLET ORAL at 08:08

## 2021-08-01 RX ADMIN — PREDNISONE 40 MG: 20 TABLET ORAL at 05:08

## 2021-08-01 RX ADMIN — PRAVASTATIN SODIUM 40 MG: 40 TABLET ORAL at 08:08

## 2021-08-01 RX ADMIN — METOPROLOL TARTRATE 50 MG: 50 TABLET, FILM COATED ORAL at 08:08

## 2021-08-01 RX ADMIN — FLUTICASONE FUROATE AND VILANTEROL TRIFENATATE 1 PUFF: 100; 25 POWDER RESPIRATORY (INHALATION) at 02:08

## 2021-08-01 RX ADMIN — LOSARTAN POTASSIUM 100 MG: 25 TABLET, FILM COATED ORAL at 08:08

## 2021-08-01 RX ADMIN — MUPIROCIN: 20 OINTMENT TOPICAL at 09:08

## 2021-08-01 RX ADMIN — AMLODIPINE BESYLATE 10 MG: 5 TABLET ORAL at 08:08

## 2021-08-01 RX ADMIN — PANTOPRAZOLE SODIUM 40 MG: 40 TABLET, DELAYED RELEASE ORAL at 08:08

## 2021-08-01 RX ADMIN — GABAPENTIN 400 MG: 400 CAPSULE ORAL at 09:08

## 2021-08-01 RX ADMIN — METOPROLOL TARTRATE 50 MG: 50 TABLET, FILM COATED ORAL at 09:08

## 2021-08-01 RX ADMIN — ALLOPURINOL 100 MG: 100 TABLET ORAL at 08:08

## 2021-08-01 RX ADMIN — ENOXAPARIN SODIUM 40 MG: 100 INJECTION SUBCUTANEOUS at 05:08

## 2021-08-01 RX ADMIN — TAMSULOSIN HYDROCHLORIDE 0.4 MG: 0.4 CAPSULE ORAL at 08:08

## 2021-08-02 VITALS
SYSTOLIC BLOOD PRESSURE: 157 MMHG | OXYGEN SATURATION: 96 % | DIASTOLIC BLOOD PRESSURE: 78 MMHG | TEMPERATURE: 98 F | HEIGHT: 68 IN | WEIGHT: 292.31 LBS | BODY MASS INDEX: 44.3 KG/M2 | RESPIRATION RATE: 20 BRPM | HEART RATE: 94 BPM

## 2021-08-02 LAB
ANION GAP SERPL CALC-SCNC: 9 MMOL/L (ref 8–16)
BASOPHILS # BLD AUTO: 0.01 K/UL (ref 0–0.2)
BASOPHILS NFR BLD: 0.1 % (ref 0–1.9)
BUN SERPL-MCNC: 18 MG/DL (ref 8–23)
CALCIUM SERPL-MCNC: 9.7 MG/DL (ref 8.7–10.5)
CHLORIDE SERPL-SCNC: 106 MMOL/L (ref 95–110)
CO2 SERPL-SCNC: 22 MMOL/L (ref 23–29)
CREAT SERPL-MCNC: 0.8 MG/DL (ref 0.5–1.4)
DIFFERENTIAL METHOD: ABNORMAL
EOSINOPHIL # BLD AUTO: 0 K/UL (ref 0–0.5)
EOSINOPHIL NFR BLD: 0 % (ref 0–8)
ERYTHROCYTE [DISTWIDTH] IN BLOOD BY AUTOMATED COUNT: 15.6 % (ref 11.5–14.5)
EST. GFR  (AFRICAN AMERICAN): >60 ML/MIN/1.73 M^2
EST. GFR  (NON AFRICAN AMERICAN): >60 ML/MIN/1.73 M^2
GLUCOSE SERPL-MCNC: 124 MG/DL (ref 70–110)
HCT VFR BLD AUTO: 47.2 % (ref 40–54)
HGB BLD-MCNC: 14.9 G/DL (ref 14–18)
IMM GRANULOCYTES # BLD AUTO: 0.06 K/UL (ref 0–0.04)
IMM GRANULOCYTES NFR BLD AUTO: 0.8 % (ref 0–0.5)
LYMPHOCYTES # BLD AUTO: 0.7 K/UL (ref 1–4.8)
LYMPHOCYTES NFR BLD: 8.7 % (ref 18–48)
MAGNESIUM SERPL-MCNC: 2.3 MG/DL (ref 1.6–2.6)
MCH RBC QN AUTO: 27.3 PG (ref 27–31)
MCHC RBC AUTO-ENTMCNC: 31.6 G/DL (ref 32–36)
MCV RBC AUTO: 87 FL (ref 82–98)
MONOCYTES # BLD AUTO: 0.4 K/UL (ref 0.3–1)
MONOCYTES NFR BLD: 5.3 % (ref 4–15)
NEUTROPHILS # BLD AUTO: 6.7 K/UL (ref 1.8–7.7)
NEUTROPHILS NFR BLD: 85.1 % (ref 38–73)
NRBC BLD-RTO: 0 /100 WBC
PHOSPHATE SERPL-MCNC: 2.7 MG/DL (ref 2.7–4.5)
PLATELET # BLD AUTO: 133 K/UL (ref 150–450)
PMV BLD AUTO: 13 FL (ref 9.2–12.9)
POTASSIUM SERPL-SCNC: 4.9 MMOL/L (ref 3.5–5.1)
RBC # BLD AUTO: 5.45 M/UL (ref 4.6–6.2)
SARS-COV-2 RNA RESP QL NAA+PROBE: NOT DETECTED
SARS-COV-2- CYCLE NUMBER: -1
SODIUM SERPL-SCNC: 137 MMOL/L (ref 136–145)
WBC # BLD AUTO: 7.86 K/UL (ref 3.9–12.7)

## 2021-08-02 PROCEDURE — 85025 COMPLETE CBC W/AUTO DIFF WBC: CPT | Performed by: STUDENT IN AN ORGANIZED HEALTH CARE EDUCATION/TRAINING PROGRAM

## 2021-08-02 PROCEDURE — 63600175 PHARM REV CODE 636 W HCPCS: Performed by: STUDENT IN AN ORGANIZED HEALTH CARE EDUCATION/TRAINING PROGRAM

## 2021-08-02 PROCEDURE — 27000221 HC OXYGEN, UP TO 24 HOURS

## 2021-08-02 PROCEDURE — 25000003 PHARM REV CODE 250: Performed by: STUDENT IN AN ORGANIZED HEALTH CARE EDUCATION/TRAINING PROGRAM

## 2021-08-02 PROCEDURE — 84100 ASSAY OF PHOSPHORUS: CPT | Performed by: STUDENT IN AN ORGANIZED HEALTH CARE EDUCATION/TRAINING PROGRAM

## 2021-08-02 PROCEDURE — 36415 COLL VENOUS BLD VENIPUNCTURE: CPT | Performed by: STUDENT IN AN ORGANIZED HEALTH CARE EDUCATION/TRAINING PROGRAM

## 2021-08-02 PROCEDURE — 63700000 PHARM REV CODE 250 ALT 637 W/O HCPCS: Performed by: STUDENT IN AN ORGANIZED HEALTH CARE EDUCATION/TRAINING PROGRAM

## 2021-08-02 PROCEDURE — 83735 ASSAY OF MAGNESIUM: CPT | Performed by: STUDENT IN AN ORGANIZED HEALTH CARE EDUCATION/TRAINING PROGRAM

## 2021-08-02 PROCEDURE — 80048 BASIC METABOLIC PNL TOTAL CA: CPT | Performed by: STUDENT IN AN ORGANIZED HEALTH CARE EDUCATION/TRAINING PROGRAM

## 2021-08-02 PROCEDURE — 94640 AIRWAY INHALATION TREATMENT: CPT

## 2021-08-02 RX ORDER — GABAPENTIN 400 MG/1
400 CAPSULE ORAL NIGHTLY
Qty: 30 CAPSULE | Refills: 1 | Status: SHIPPED | OUTPATIENT
Start: 2021-08-02 | End: 2021-08-23 | Stop reason: SDUPTHER

## 2021-08-02 RX ORDER — PREDNISONE 20 MG/1
40 TABLET ORAL DAILY
Qty: 3 TABLET | Refills: 0 | Status: SHIPPED | OUTPATIENT
Start: 2021-08-03 | End: 2021-08-23 | Stop reason: ALTCHOICE

## 2021-08-02 RX ADMIN — METOPROLOL TARTRATE 50 MG: 50 TABLET, FILM COATED ORAL at 08:08

## 2021-08-02 RX ADMIN — TAMSULOSIN HYDROCHLORIDE 0.4 MG: 0.4 CAPSULE ORAL at 08:08

## 2021-08-02 RX ADMIN — CEFTRIAXONE 1 G: 1 INJECTION, SOLUTION INTRAVENOUS at 12:08

## 2021-08-02 RX ADMIN — ENOXAPARIN SODIUM 40 MG: 100 INJECTION SUBCUTANEOUS at 04:08

## 2021-08-02 RX ADMIN — ALLOPURINOL 100 MG: 100 TABLET ORAL at 08:08

## 2021-08-02 RX ADMIN — PREDNISONE 40 MG: 20 TABLET ORAL at 08:08

## 2021-08-02 RX ADMIN — PRAVASTATIN SODIUM 40 MG: 40 TABLET ORAL at 08:08

## 2021-08-02 RX ADMIN — PANTOPRAZOLE SODIUM 40 MG: 40 TABLET, DELAYED RELEASE ORAL at 08:08

## 2021-08-02 RX ADMIN — FLUTICASONE FUROATE AND VILANTEROL TRIFENATATE 1 PUFF: 100; 25 POWDER RESPIRATORY (INHALATION) at 09:08

## 2021-08-02 RX ADMIN — MUPIROCIN: 20 OINTMENT TOPICAL at 08:08

## 2021-08-02 RX ADMIN — AZITHROMYCIN MONOHYDRATE 500 MG: 250 TABLET ORAL at 08:08

## 2021-08-02 RX ADMIN — LOSARTAN POTASSIUM 100 MG: 25 TABLET, FILM COATED ORAL at 08:08

## 2021-08-02 RX ADMIN — AMLODIPINE BESYLATE 10 MG: 5 TABLET ORAL at 08:08

## 2021-08-03 LAB
BACTERIA BLD CULT: NORMAL
BACTERIA BLD CULT: NORMAL

## 2021-08-05 ENCOUNTER — PATIENT OUTREACH (OUTPATIENT)
Dept: ADMINISTRATIVE | Facility: CLINIC | Age: 76
End: 2021-08-05

## 2021-08-05 DIAGNOSIS — J44.1 COPD WITH ACUTE EXACERBATION: Primary | ICD-10-CM

## 2021-08-12 ENCOUNTER — OFFICE VISIT (OUTPATIENT)
Dept: HOME HEALTH SERVICES | Facility: CLINIC | Age: 76
End: 2021-08-12
Payer: MEDICARE

## 2021-08-12 DIAGNOSIS — J44.1 COPD WITH ACUTE EXACERBATION: ICD-10-CM

## 2021-08-12 PROCEDURE — 99442 PR PHYSICIAN TELEPHONE EVALUATION 11-20 MIN: ICD-10-PCS | Mod: 95,,, | Performed by: NURSE PRACTITIONER

## 2021-08-12 PROCEDURE — 99442 PR PHYSICIAN TELEPHONE EVALUATION 11-20 MIN: CPT | Mod: 95,,, | Performed by: NURSE PRACTITIONER

## 2021-08-23 ENCOUNTER — OFFICE VISIT (OUTPATIENT)
Dept: FAMILY MEDICINE | Facility: CLINIC | Age: 76
End: 2021-08-23
Payer: MEDICARE

## 2021-08-23 VITALS
OXYGEN SATURATION: 95 % | TEMPERATURE: 100 F | SYSTOLIC BLOOD PRESSURE: 132 MMHG | DIASTOLIC BLOOD PRESSURE: 61 MMHG | WEIGHT: 291 LBS | HEART RATE: 92 BPM | HEIGHT: 65 IN | BODY MASS INDEX: 48.48 KG/M2

## 2021-08-23 DIAGNOSIS — M79.2 NEURALGIA: Primary | ICD-10-CM

## 2021-08-23 PROCEDURE — 99213 OFFICE O/P EST LOW 20 MIN: CPT | Mod: PBBFAC,PO | Performed by: FAMILY MEDICINE

## 2021-08-23 PROCEDURE — 99214 PR OFFICE/OUTPT VISIT, EST, LEVL IV, 30-39 MIN: ICD-10-PCS | Mod: S$PBB,,, | Performed by: FAMILY MEDICINE

## 2021-08-23 PROCEDURE — 99999 PR PBB SHADOW E&M-EST. PATIENT-LVL III: ICD-10-PCS | Mod: PBBFAC,,, | Performed by: FAMILY MEDICINE

## 2021-08-23 PROCEDURE — 99999 PR PBB SHADOW E&M-EST. PATIENT-LVL III: CPT | Mod: PBBFAC,,, | Performed by: FAMILY MEDICINE

## 2021-08-23 PROCEDURE — 99214 OFFICE O/P EST MOD 30 MIN: CPT | Mod: S$PBB,,, | Performed by: FAMILY MEDICINE

## 2021-08-23 RX ORDER — GABAPENTIN 400 MG/1
400 CAPSULE ORAL 2 TIMES DAILY
Qty: 180 CAPSULE | Refills: 3 | Status: SHIPPED | OUTPATIENT
Start: 2021-08-23 | End: 2022-08-23

## 2021-09-29 ENCOUNTER — OFFICE VISIT (OUTPATIENT)
Dept: FAMILY MEDICINE | Facility: CLINIC | Age: 76
End: 2021-09-29
Payer: MEDICARE

## 2021-09-29 VITALS
OXYGEN SATURATION: 95 % | BODY MASS INDEX: 41.6 KG/M2 | TEMPERATURE: 98 F | HEIGHT: 69 IN | HEART RATE: 91 BPM | WEIGHT: 280.88 LBS | SYSTOLIC BLOOD PRESSURE: 128 MMHG | DIASTOLIC BLOOD PRESSURE: 64 MMHG

## 2021-09-29 DIAGNOSIS — J43.1 PANLOBULAR EMPHYSEMA: Primary | Chronic | ICD-10-CM

## 2021-09-29 DIAGNOSIS — M17.10 PRIMARY OSTEOARTHRITIS OF KNEE, UNSPECIFIED LATERALITY: Chronic | ICD-10-CM

## 2021-09-29 DIAGNOSIS — J44.89 COPD (CHRONIC OBSTRUCTIVE PULMONARY DISEASE) WITH CHRONIC BRONCHITIS: ICD-10-CM

## 2021-09-29 PROBLEM — M25.552 LEFT HIP PAIN: Status: RESOLVED | Noted: 2018-10-01 | Resolved: 2021-09-29

## 2021-09-29 PROBLEM — Z20.822 SUSPECTED COVID-19 VIRUS INFECTION: Status: RESOLVED | Noted: 2021-07-30 | Resolved: 2021-09-29

## 2021-09-29 PROBLEM — I95.9 HYPOTENSION: Status: RESOLVED | Noted: 2019-04-05 | Resolved: 2021-09-29

## 2021-09-29 PROBLEM — R31.0 GROSS HEMATURIA: Status: RESOLVED | Noted: 2019-03-29 | Resolved: 2021-09-29

## 2021-09-29 PROBLEM — R52 ACUTE PAIN: Status: RESOLVED | Noted: 2018-10-01 | Resolved: 2021-09-29

## 2021-09-29 PROBLEM — M72.0 CONTRACTURE OF PALMAR FASCIA: Status: ACTIVE | Noted: 2021-09-29

## 2021-09-29 PROBLEM — J44.1 COPD WITH ACUTE EXACERBATION: Status: RESOLVED | Noted: 2021-07-30 | Resolved: 2021-09-29

## 2021-09-29 PROBLEM — D62 ACUTE BLOOD LOSS ANEMIA: Status: RESOLVED | Noted: 2019-04-05 | Resolved: 2021-09-29

## 2021-09-29 PROBLEM — M79.604 RIGHT LEG PAIN: Status: RESOLVED | Noted: 2018-12-27 | Resolved: 2021-09-29

## 2021-09-29 PROBLEM — N17.9 AKI (ACUTE KIDNEY INJURY): Status: RESOLVED | Noted: 2019-04-05 | Resolved: 2021-09-29

## 2021-09-29 PROBLEM — I48.0 PAROXYSMAL ATRIAL FIBRILLATION: Status: RESOLVED | Noted: 2019-04-30 | Resolved: 2021-09-29

## 2021-09-29 PROBLEM — M10.9 GOUTY ARTHRITIS OF RIGHT GREAT TOE: Status: RESOLVED | Noted: 2018-12-27 | Resolved: 2021-09-29

## 2021-09-29 PROBLEM — K92.2 GASTROINTESTINAL HEMORRHAGE: Status: RESOLVED | Noted: 2019-04-05 | Resolved: 2021-09-29

## 2021-09-29 PROBLEM — I10 HTN, GOAL BELOW 140/90: Status: RESOLVED | Noted: 2018-07-24 | Resolved: 2021-09-29

## 2021-09-29 PROBLEM — M10.9 ACUTE GOUT OF RIGHT ANKLE: Status: RESOLVED | Noted: 2018-12-27 | Resolved: 2021-09-29

## 2021-09-29 PROCEDURE — 99999 PR PBB SHADOW E&M-EST. PATIENT-LVL III: CPT | Mod: PBBFAC,,, | Performed by: FAMILY MEDICINE

## 2021-09-29 PROCEDURE — 99999 PR PBB SHADOW E&M-EST. PATIENT-LVL III: ICD-10-PCS | Mod: PBBFAC,,, | Performed by: FAMILY MEDICINE

## 2021-09-29 PROCEDURE — 99214 PR OFFICE/OUTPT VISIT, EST, LEVL IV, 30-39 MIN: ICD-10-PCS | Mod: S$PBB,,, | Performed by: FAMILY MEDICINE

## 2021-09-29 PROCEDURE — 99213 OFFICE O/P EST LOW 20 MIN: CPT | Mod: PBBFAC,PO | Performed by: FAMILY MEDICINE

## 2021-09-29 PROCEDURE — 99214 OFFICE O/P EST MOD 30 MIN: CPT | Mod: S$PBB,,, | Performed by: FAMILY MEDICINE

## 2021-09-29 RX ORDER — METHYLPREDNISOLONE 4 MG/1
TABLET ORAL
Qty: 1 PACKAGE | Refills: 0 | Status: SHIPPED | OUTPATIENT
Start: 2021-09-29 | End: 2021-10-12 | Stop reason: ALTCHOICE

## 2021-09-29 RX ORDER — FLUTICASONE PROPIONATE AND SALMETEROL 250; 50 UG/1; UG/1
1 POWDER RESPIRATORY (INHALATION) 2 TIMES DAILY
Qty: 180 EACH | Refills: 3 | Status: SHIPPED | OUTPATIENT
Start: 2021-09-29 | End: 2022-12-20 | Stop reason: SDUPTHER

## 2021-09-29 RX ORDER — DICLOFENAC SODIUM 10 MG/G
2 GEL TOPICAL DAILY
Qty: 100 G | Refills: 1 | Status: SHIPPED | OUTPATIENT
Start: 2021-09-29 | End: 2022-03-08

## 2021-10-12 ENCOUNTER — OFFICE VISIT (OUTPATIENT)
Dept: FAMILY MEDICINE | Facility: CLINIC | Age: 76
End: 2021-10-12
Payer: MEDICARE

## 2021-10-12 VITALS
HEART RATE: 73 BPM | WEIGHT: 283.31 LBS | DIASTOLIC BLOOD PRESSURE: 68 MMHG | BODY MASS INDEX: 42.94 KG/M2 | HEIGHT: 68 IN | TEMPERATURE: 98 F | SYSTOLIC BLOOD PRESSURE: 124 MMHG | OXYGEN SATURATION: 96 %

## 2021-10-12 DIAGNOSIS — M17.10 PRIMARY OSTEOARTHRITIS OF KNEE, UNSPECIFIED LATERALITY: ICD-10-CM

## 2021-10-12 DIAGNOSIS — J43.1 PANLOBULAR EMPHYSEMA: Primary | ICD-10-CM

## 2021-10-12 PROCEDURE — 99213 OFFICE O/P EST LOW 20 MIN: CPT | Mod: S$PBB,,, | Performed by: FAMILY MEDICINE

## 2021-10-12 PROCEDURE — 99999 PR PBB SHADOW E&M-EST. PATIENT-LVL IV: CPT | Mod: PBBFAC,,, | Performed by: FAMILY MEDICINE

## 2021-10-12 PROCEDURE — 99213 PR OFFICE/OUTPT VISIT, EST, LEVL III, 20-29 MIN: ICD-10-PCS | Mod: S$PBB,,, | Performed by: FAMILY MEDICINE

## 2021-10-12 PROCEDURE — 99999 PR PBB SHADOW E&M-EST. PATIENT-LVL IV: ICD-10-PCS | Mod: PBBFAC,,, | Performed by: FAMILY MEDICINE

## 2021-10-12 PROCEDURE — 99214 OFFICE O/P EST MOD 30 MIN: CPT | Mod: PBBFAC,PO | Performed by: FAMILY MEDICINE

## 2021-11-22 ENCOUNTER — OFFICE VISIT (OUTPATIENT)
Dept: FAMILY MEDICINE | Facility: CLINIC | Age: 76
End: 2021-11-22
Payer: MEDICARE

## 2021-11-22 VITALS
HEART RATE: 86 BPM | DIASTOLIC BLOOD PRESSURE: 68 MMHG | SYSTOLIC BLOOD PRESSURE: 138 MMHG | WEIGHT: 290.13 LBS | OXYGEN SATURATION: 95 % | TEMPERATURE: 98 F | BODY MASS INDEX: 43.97 KG/M2 | HEIGHT: 68 IN

## 2021-11-22 DIAGNOSIS — M79.89 LEG SWELLING: Primary | ICD-10-CM

## 2021-11-22 PROCEDURE — 99999 PR PBB SHADOW E&M-EST. PATIENT-LVL IV: CPT | Mod: PBBFAC,,, | Performed by: FAMILY MEDICINE

## 2021-11-22 PROCEDURE — 99214 OFFICE O/P EST MOD 30 MIN: CPT | Mod: S$PBB,,, | Performed by: FAMILY MEDICINE

## 2021-11-22 PROCEDURE — 99214 PR OFFICE/OUTPT VISIT, EST, LEVL IV, 30-39 MIN: ICD-10-PCS | Mod: S$PBB,,, | Performed by: FAMILY MEDICINE

## 2021-11-22 PROCEDURE — 99999 PR PBB SHADOW E&M-EST. PATIENT-LVL IV: ICD-10-PCS | Mod: PBBFAC,,, | Performed by: FAMILY MEDICINE

## 2021-11-22 PROCEDURE — 99214 OFFICE O/P EST MOD 30 MIN: CPT | Mod: PBBFAC,PO | Performed by: FAMILY MEDICINE

## 2021-11-22 RX ORDER — CEPHALEXIN 500 MG/1
500 CAPSULE ORAL 4 TIMES DAILY
Qty: 28 CAPSULE | Refills: 0 | Status: SHIPPED | OUTPATIENT
Start: 2021-11-22 | End: 2021-11-29

## 2021-11-22 RX ORDER — TAMSULOSIN HYDROCHLORIDE 0.4 MG/1
1 CAPSULE ORAL DAILY
COMMUNITY
Start: 2021-11-12 | End: 2021-12-16

## 2021-11-22 RX ORDER — FUROSEMIDE 20 MG/1
20 TABLET ORAL 3 TIMES DAILY PRN
Qty: 90 TABLET | Refills: 11 | Status: SHIPPED | OUTPATIENT
Start: 2021-11-22 | End: 2022-09-16 | Stop reason: SDUPTHER

## 2021-12-04 ENCOUNTER — PES CALL (OUTPATIENT)
Dept: ADMINISTRATIVE | Facility: CLINIC | Age: 76
End: 2021-12-04
Payer: MEDICARE

## 2021-12-14 ENCOUNTER — TELEPHONE (OUTPATIENT)
Dept: ADMINISTRATIVE | Facility: CLINIC | Age: 76
End: 2021-12-14
Payer: MEDICARE

## 2021-12-16 ENCOUNTER — OFFICE VISIT (OUTPATIENT)
Dept: FAMILY MEDICINE | Facility: CLINIC | Age: 76
End: 2021-12-16
Payer: MEDICARE

## 2021-12-16 VITALS
RESPIRATION RATE: 16 BRPM | HEIGHT: 68 IN | HEART RATE: 69 BPM | WEIGHT: 277.56 LBS | DIASTOLIC BLOOD PRESSURE: 76 MMHG | OXYGEN SATURATION: 98 % | SYSTOLIC BLOOD PRESSURE: 136 MMHG | BODY MASS INDEX: 42.07 KG/M2 | TEMPERATURE: 98 F

## 2021-12-16 DIAGNOSIS — I48.11 LONGSTANDING PERSISTENT ATRIAL FIBRILLATION: ICD-10-CM

## 2021-12-16 DIAGNOSIS — Z00.00 ENCOUNTER FOR PREVENTIVE HEALTH EXAMINATION: Primary | ICD-10-CM

## 2021-12-16 DIAGNOSIS — I10 PRIMARY HYPERTENSION: ICD-10-CM

## 2021-12-16 DIAGNOSIS — E78.00 PURE HYPERCHOLESTEROLEMIA: ICD-10-CM

## 2021-12-16 DIAGNOSIS — M15.9 PRIMARY OSTEOARTHRITIS INVOLVING MULTIPLE JOINTS: ICD-10-CM

## 2021-12-16 DIAGNOSIS — H35.3131 EARLY DRY STAGE NONEXUDATIVE AGE-RELATED MACULAR DEGENERATION OF BOTH EYES: ICD-10-CM

## 2021-12-16 DIAGNOSIS — D69.6 THROMBOCYTOPENIA: ICD-10-CM

## 2021-12-16 DIAGNOSIS — I87.2 VENOUS STASIS DERMATITIS OF BOTH LOWER EXTREMITIES: ICD-10-CM

## 2021-12-16 DIAGNOSIS — I73.9 PVD (PERIPHERAL VASCULAR DISEASE): ICD-10-CM

## 2021-12-16 DIAGNOSIS — E66.01 MORBID OBESITY WITH BMI OF 40.0-44.9, ADULT: ICD-10-CM

## 2021-12-16 DIAGNOSIS — N13.8 BPH WITH OBSTRUCTION/LOWER URINARY TRACT SYMPTOMS: ICD-10-CM

## 2021-12-16 DIAGNOSIS — N40.1 BPH WITH OBSTRUCTION/LOWER URINARY TRACT SYMPTOMS: ICD-10-CM

## 2021-12-16 DIAGNOSIS — J43.1 PANLOBULAR EMPHYSEMA: Chronic | ICD-10-CM

## 2021-12-16 DIAGNOSIS — G47.33 OBSTRUCTIVE SLEEP APNEA SYNDROME: ICD-10-CM

## 2021-12-16 DIAGNOSIS — I70.0 ATHEROSCLEROSIS OF AORTA: ICD-10-CM

## 2021-12-16 DIAGNOSIS — D50.9 IRON DEFICIENCY ANEMIA, UNSPECIFIED IRON DEFICIENCY ANEMIA TYPE: ICD-10-CM

## 2021-12-16 PROBLEM — G62.9 POLYNEUROPATHY: Status: ACTIVE | Noted: 2021-12-16

## 2021-12-16 PROBLEM — Z96.1 PRESENCE OF INTRAOCULAR LENS: Status: ACTIVE | Noted: 2021-12-16

## 2021-12-16 PROBLEM — Z87.19 HISTORY OF GI BLEED: Status: ACTIVE | Noted: 2021-12-16

## 2021-12-16 PROBLEM — R73.01 IFG (IMPAIRED FASTING GLUCOSE): Status: ACTIVE | Noted: 2021-12-16

## 2021-12-16 PROCEDURE — 99214 OFFICE O/P EST MOD 30 MIN: CPT | Mod: PBBFAC,PO | Performed by: NURSE PRACTITIONER

## 2021-12-16 PROCEDURE — 99999 PR PBB SHADOW E&M-EST. PATIENT-LVL IV: ICD-10-PCS | Mod: PBBFAC,,, | Performed by: NURSE PRACTITIONER

## 2021-12-16 PROCEDURE — G0439 PPPS, SUBSEQ VISIT: HCPCS | Mod: ,,, | Performed by: NURSE PRACTITIONER

## 2021-12-16 PROCEDURE — G0439 PR MEDICARE ANNUAL WELLNESS SUBSEQUENT VISIT: ICD-10-PCS | Mod: ,,, | Performed by: NURSE PRACTITIONER

## 2021-12-16 PROCEDURE — 99999 PR PBB SHADOW E&M-EST. PATIENT-LVL IV: CPT | Mod: PBBFAC,,, | Performed by: NURSE PRACTITIONER

## 2022-03-17 DIAGNOSIS — K21.9 GASTROESOPHAGEAL REFLUX DISEASE WITHOUT ESOPHAGITIS: ICD-10-CM

## 2022-03-17 RX ORDER — OMEPRAZOLE 20 MG/1
20 CAPSULE, DELAYED RELEASE ORAL 2 TIMES DAILY
Qty: 180 CAPSULE | Refills: 3 | Status: SHIPPED | OUTPATIENT
Start: 2022-03-17 | End: 2022-06-17 | Stop reason: SDUPTHER

## 2022-03-17 NOTE — TELEPHONE ENCOUNTER
----- Message from Ivy Joann sent at 3/17/2022  2:08 PM CDT -----  Regarding: self [phone]  .Type: RX Refill Request    Who Called: self     Have you contacted your pharmacy: yes     Refill or New Rx: refill     RX Name and Strength: omeprazole (PRILOSEC) 20 MG     Is this a 30 day or 90 day RX: 90 day     Preferred Pharmacy with phone number: .  [id number] - [place] [address] [facility] [address] [place] [address] [facility] [address]  Phone: [phone] Fax: [phone]    Local or Mail Order: local     Would the patient rather a call back or a response via [facility]?  Call back     Best Call Back Number: .[phone]

## 2022-03-17 NOTE — TELEPHONE ENCOUNTER
No new care gaps identified.  Powered by [employer]. Reference number: [id number].   3/17/2022 2:17:27 PM CDT

## 2022-03-30 ENCOUNTER — OFFICE VISIT (OUTPATIENT)
Dept: FAMILY MEDICINE | Facility: CLINIC | Age: 77
End: 2022-03-30
Payer: MEDICARE

## 2022-03-30 VITALS
BODY MASS INDEX: 43.67 KG/M2 | RESPIRATION RATE: 16 BRPM | DIASTOLIC BLOOD PRESSURE: 80 MMHG | OXYGEN SATURATION: 96 % | TEMPERATURE: 98 F | WEIGHT: 288.13 LBS | HEART RATE: 63 BPM | HEIGHT: 68 IN | SYSTOLIC BLOOD PRESSURE: 150 MMHG

## 2022-03-30 DIAGNOSIS — R73.03 PREDIABETES: ICD-10-CM

## 2022-03-30 DIAGNOSIS — E66.01 MORBID OBESITY WITH BMI OF 40.0-44.9, ADULT: ICD-10-CM

## 2022-03-30 DIAGNOSIS — L97.811 VENOUS STASIS ULCER OF OTHER PART OF RIGHT LOWER LEG LIMITED TO BREAKDOWN OF SKIN WITHOUT VARICOSE VEINS: Primary | ICD-10-CM

## 2022-03-30 DIAGNOSIS — I70.0 ATHEROSCLEROSIS OF AORTA: ICD-10-CM

## 2022-03-30 DIAGNOSIS — I48.11 LONGSTANDING PERSISTENT ATRIAL FIBRILLATION: ICD-10-CM

## 2022-03-30 DIAGNOSIS — I87.2 VENOUS STASIS ULCER OF OTHER PART OF RIGHT LOWER LEG LIMITED TO BREAKDOWN OF SKIN WITHOUT VARICOSE VEINS: Primary | ICD-10-CM

## 2022-03-30 DIAGNOSIS — I73.9 PVD (PERIPHERAL VASCULAR DISEASE): ICD-10-CM

## 2022-03-30 DIAGNOSIS — J43.1 PANLOBULAR EMPHYSEMA: ICD-10-CM

## 2022-03-30 DIAGNOSIS — D69.6 THROMBOCYTOPENIA: ICD-10-CM

## 2022-03-30 DIAGNOSIS — Z12.5 PROSTATE CANCER SCREENING: ICD-10-CM

## 2022-03-30 DIAGNOSIS — I10 PRIMARY HYPERTENSION: ICD-10-CM

## 2022-03-30 DIAGNOSIS — G62.9 POLYNEUROPATHY: ICD-10-CM

## 2022-03-30 PROCEDURE — 99215 OFFICE O/P EST HI 40 MIN: CPT | Mod: PBBFAC,PO | Performed by: NURSE PRACTITIONER

## 2022-03-30 PROCEDURE — 99999 PR PBB SHADOW E&M-EST. PATIENT-LVL V: ICD-10-PCS | Mod: PBBFAC,,, | Performed by: NURSE PRACTITIONER

## 2022-03-30 PROCEDURE — 99999 PR PBB SHADOW E&M-EST. PATIENT-LVL V: CPT | Mod: PBBFAC,,, | Performed by: NURSE PRACTITIONER

## 2022-03-30 PROCEDURE — 99214 PR OFFICE/OUTPT VISIT, EST, LEVL IV, 30-39 MIN: ICD-10-PCS | Mod: S$PBB,,, | Performed by: NURSE PRACTITIONER

## 2022-03-30 PROCEDURE — 99214 OFFICE O/P EST MOD 30 MIN: CPT | Mod: S$PBB,,, | Performed by: NURSE PRACTITIONER

## 2022-03-30 RX ORDER — METOPROLOL TARTRATE 50 MG/1
50 TABLET ORAL 2 TIMES DAILY
COMMUNITY
Start: 2022-03-09 | End: 2022-03-30 | Stop reason: SDUPTHER

## 2022-03-30 NOTE — Clinical Note
Good morning, I placed a referral to wound care clinic, can you make sure appointment is scheduled, THANK YOU

## 2022-03-30 NOTE — PROGRESS NOTES
"Subjective:      Patient ID: John E Sandifer is a 76 y.o. male.  Pt presents to clinic with non healing wound to right lower leg that has been present for months.     Wound Check  He was originally treated more than 14 days ago. Prior ED Treatment: topical antibiotic. His temperature was unmeasured prior to arrival. There has been bloody and clear discharge from the wound. The redness has worsened. The swelling has worsened. There is no pain present. He has no difficulty moving the affected extremity or digit.     Review of Systems   Constitutional: Negative for activity change, appetite change, fever and unexpected weight change.   HENT: Negative for nasal congestion, ear pain, postnasal drip, rhinorrhea, sneezing, sore throat and voice change.    Eyes: Negative for pain and visual disturbance.   Respiratory: Negative for cough, chest tightness and shortness of breath.    Cardiovascular: Positive for leg swelling and claudication. Negative for chest pain and palpitations.   Gastrointestinal: Negative for abdominal pain, constipation, diarrhea, nausea and vomiting.   Endocrine: Negative.    Genitourinary: Negative for difficulty urinating.   Musculoskeletal: Positive for arthralgias, back pain, joint swelling, leg pain, myalgias and joint deformity. Negative for gait problem, neck pain and neck stiffness.   Integumentary:  Positive for color change and wound. Negative for rash.   Allergic/Immunologic: Negative.    Neurological: Negative for speech difficulty and headaches.   Hematological: Negative.    Psychiatric/Behavioral: Negative.    All other systems reviewed and are negative.        Objective:     Vitals:    03/30/22 1154   BP: (!) 150/80   Pulse: 63   Resp: 16   Temp: 98 °F (36.7 °C)   TempSrc: Oral   SpO2: 96%   Weight: 130.7 kg (288 lb 2.3 oz)   Height: 5' 8" (1.727 m)     Physical Exam  Vitals and nursing note reviewed.   Constitutional:       General: He is not in acute distress.     Appearance: Normal " appearance. He is well-developed and well-groomed. He is obese. He is not ill-appearing.   HENT:      Head: Normocephalic and atraumatic.      Right Ear: External ear normal.      Left Ear: External ear normal.      Nose: Nose normal.      Mouth/Throat:      Lips: Pink.      Mouth: Mucous membranes are moist.   Eyes:      General: Lids are normal.      Conjunctiva/sclera: Conjunctivae normal.      Pupils: Pupils are equal, round, and reactive to light.   Neck:      Trachea: Phonation normal.   Cardiovascular:      Rate and Rhythm: Normal rate. Rhythm irregular.      Heart sounds: Murmur heard.   Pulmonary:      Effort: Pulmonary effort is normal. No respiratory distress.      Breath sounds: Normal breath sounds and air entry. No wheezing or rales.   Abdominal:      General: Abdomen is protuberant. Bowel sounds are normal. There is no distension.      Palpations: Abdomen is soft.      Tenderness: There is no abdominal tenderness.   Musculoskeletal:      Cervical back: Neck supple.      Right lower leg: 3+ Pitting Edema present.      Left lower leg: 3+ Pitting Edema present.   Skin:     General: Skin is warm and dry.      Findings: Wound present. No rash.      Comments: See picture   Neurological:      General: No focal deficit present.      Mental Status: He is alert and oriented to person, place, and time. Mental status is at baseline.      Sensory: Sensory deficit present.      Motor: Motor function is intact.      Coordination: Coordination is intact.      Gait: Gait abnormal.   Psychiatric:         Attention and Perception: Attention and perception normal.         Mood and Affect: Mood and affect normal.         Speech: Speech normal.         Behavior: Behavior normal. Behavior is cooperative.         Thought Content: Thought content normal.         Cognition and Memory: Cognition and memory normal.         Judgment: Judgment normal.           Assessment and Plan:     1. Venous stasis ulcer of other part of right  lower leg limited to breakdown of skin without varicose veins  Education about claudication causes and treatment discussed.  follow up with vascular surgery for further evaluation   - CBC Auto Differential; Future  - Comprehensive Metabolic Panel; Future  - Ambulatory referral/consult to Wound Clinic; Future    2. PVD (peripheral vascular disease)  Same as above  - CBC Auto Differential; Future  - Comprehensive Metabolic Panel; Future    3. Longstanding persistent atrial fibrillation  Stable on current regimen, closely followed by cards  - CBC Auto Differential; Future  - Comprehensive Metabolic Panel; Future    4. Primary hypertension  BP elevated today, continue current meds,  RTC for BP check   - CBC Auto Differential; Future  - Comprehensive Metabolic Panel; Future    5. Atherosclerosis of aorta  Stable on statin, BP elevated today   - CBC Auto Differential; Future  - Comprehensive Metabolic Panel; Future  - Lipid Panel; Future    6. Panlobular emphysema  Stable daily ICS/LABA   - CBC Auto Differential; Future  - Comprehensive Metabolic Panel; Future    7. Polyneuropathy  Stable with gabapentin daily  - CBC Auto Differential; Future  - Comprehensive Metabolic Panel; Future    8. Prediabetes   The patient is asked to make an attempt to improve diet and exercise patterns to aid in medical management of this problem.  - CBC Auto Differential; Future  - Comprehensive Metabolic Panel; Future  - Hemoglobin A1C; Future  - TSH; Future    9. Thrombocytopenia  No acute hemorrhage  - CBC Auto Differential; Future  - Comprehensive Metabolic Panel; Future    10. Morbid obesity with BMI of 40.0-44.9, adult  The patient is asked to make an attempt to improve diet and exercise patterns to aid in medical management of this problem.  - CBC Auto Differential; Future  - Comprehensive Metabolic Panel; Future  - Hemoglobin A1C; Future  - TSH; Future    11. Prostate cancer screening  - PSA, Screening; Future           Jinny  NORIS Mcelroy, FNP-C  Family/Internal Medicine  [facility]

## 2022-04-01 ENCOUNTER — CLINICAL SUPPORT (OUTPATIENT)
Dept: FAMILY MEDICINE | Facility: CLINIC | Age: 77
End: 2022-04-01
Payer: MEDICARE

## 2022-04-01 ENCOUNTER — LAB VISIT (OUTPATIENT)
Dept: LAB | Facility: HOSPITAL | Age: 77
End: 2022-04-01
Attending: NURSE PRACTITIONER
Payer: MEDICARE

## 2022-04-01 VITALS — DIASTOLIC BLOOD PRESSURE: 80 MMHG | SYSTOLIC BLOOD PRESSURE: 134 MMHG

## 2022-04-01 DIAGNOSIS — G62.9 POLYNEUROPATHY: ICD-10-CM

## 2022-04-01 DIAGNOSIS — I10 PRIMARY HYPERTENSION: Primary | ICD-10-CM

## 2022-04-01 DIAGNOSIS — J43.1 PANLOBULAR EMPHYSEMA: ICD-10-CM

## 2022-04-01 DIAGNOSIS — L97.811 VENOUS STASIS ULCER OF OTHER PART OF RIGHT LOWER LEG LIMITED TO BREAKDOWN OF SKIN WITHOUT VARICOSE VEINS: ICD-10-CM

## 2022-04-01 DIAGNOSIS — I70.0 ATHEROSCLEROSIS OF AORTA: ICD-10-CM

## 2022-04-01 DIAGNOSIS — I48.11 LONGSTANDING PERSISTENT ATRIAL FIBRILLATION: ICD-10-CM

## 2022-04-01 DIAGNOSIS — Z12.5 PROSTATE CANCER SCREENING: ICD-10-CM

## 2022-04-01 DIAGNOSIS — E66.01 MORBID OBESITY WITH BMI OF 40.0-44.9, ADULT: ICD-10-CM

## 2022-04-01 DIAGNOSIS — D69.6 THROMBOCYTOPENIA: ICD-10-CM

## 2022-04-01 DIAGNOSIS — I10 PRIMARY HYPERTENSION: ICD-10-CM

## 2022-04-01 DIAGNOSIS — I87.2 VENOUS STASIS ULCER OF OTHER PART OF RIGHT LOWER LEG LIMITED TO BREAKDOWN OF SKIN WITHOUT VARICOSE VEINS: ICD-10-CM

## 2022-04-01 DIAGNOSIS — I73.9 PVD (PERIPHERAL VASCULAR DISEASE): ICD-10-CM

## 2022-04-01 DIAGNOSIS — R73.03 PREDIABETES: ICD-10-CM

## 2022-04-01 LAB
ALBUMIN SERPL BCP-MCNC: 3.9 G/DL (ref 3.5–5.2)
ALP SERPL-CCNC: 90 U/L (ref 55–135)
ALT SERPL W/O P-5'-P-CCNC: 16 U/L (ref 10–44)
ANION GAP SERPL CALC-SCNC: 7 MMOL/L (ref 8–16)
AST SERPL-CCNC: 19 U/L (ref 10–40)
BASOPHILS # BLD AUTO: 0.09 K/UL (ref 0–0.2)
BASOPHILS NFR BLD: 1.2 % (ref 0–1.9)
BILIRUB SERPL-MCNC: 0.9 MG/DL (ref 0.1–1)
BUN SERPL-MCNC: 12 MG/DL (ref 8–23)
CALCIUM SERPL-MCNC: 10.4 MG/DL (ref 8.7–10.5)
CHLORIDE SERPL-SCNC: 101 MMOL/L (ref 95–110)
CHOLEST SERPL-MCNC: 134 MG/DL (ref 120–199)
CHOLEST/HDLC SERPL: 3.2 {RATIO} (ref 2–5)
CO2 SERPL-SCNC: 28 MMOL/L (ref 23–29)
COMPLEXED PSA SERPL-MCNC: 6.4 NG/ML (ref 0–4)
CREAT SERPL-MCNC: 1 MG/DL (ref 0.5–1.4)
DIFFERENTIAL METHOD: ABNORMAL
EOSINOPHIL # BLD AUTO: 0.3 K/UL (ref 0–0.5)
EOSINOPHIL NFR BLD: 4.3 % (ref 0–8)
ERYTHROCYTE [DISTWIDTH] IN BLOOD BY AUTOMATED COUNT: 15.9 % (ref 11.5–14.5)
EST. GFR  (AFRICAN AMERICAN): >60 ML/MIN/1.73 M^2
EST. GFR  (NON AFRICAN AMERICAN): >60 ML/MIN/1.73 M^2
ESTIMATED AVG GLUCOSE: 114 MG/DL (ref 68–131)
GLUCOSE SERPL-MCNC: 96 MG/DL (ref 70–110)
HBA1C MFR BLD: 5.6 % (ref 4–5.6)
HCT VFR BLD AUTO: 46.9 % (ref 40–54)
HDLC SERPL-MCNC: 42 MG/DL (ref 40–75)
HDLC SERPL: 31.3 % (ref 20–50)
HGB BLD-MCNC: 14.3 G/DL (ref 14–18)
IMM GRANULOCYTES # BLD AUTO: 0.05 K/UL (ref 0–0.04)
IMM GRANULOCYTES NFR BLD AUTO: 0.6 % (ref 0–0.5)
LDLC SERPL CALC-MCNC: 60 MG/DL (ref 63–159)
LYMPHOCYTES # BLD AUTO: 1.1 K/UL (ref 1–4.8)
LYMPHOCYTES NFR BLD: 13.9 % (ref 18–48)
MCH RBC QN AUTO: 25.9 PG (ref 27–31)
MCHC RBC AUTO-ENTMCNC: 30.5 G/DL (ref 32–36)
MCV RBC AUTO: 85 FL (ref 82–98)
MONOCYTES # BLD AUTO: 0.9 K/UL (ref 0.3–1)
MONOCYTES NFR BLD: 11.5 % (ref 4–15)
NEUTROPHILS # BLD AUTO: 5.3 K/UL (ref 1.8–7.7)
NEUTROPHILS NFR BLD: 68.5 % (ref 38–73)
NONHDLC SERPL-MCNC: 92 MG/DL
NRBC BLD-RTO: 0 /100 WBC
PLATELET # BLD AUTO: 151 K/UL (ref 150–450)
PMV BLD AUTO: 12.6 FL (ref 9.2–12.9)
POTASSIUM SERPL-SCNC: 4.4 MMOL/L (ref 3.5–5.1)
PROT SERPL-MCNC: 7.9 G/DL (ref 6–8.4)
RBC # BLD AUTO: 5.53 M/UL (ref 4.6–6.2)
SODIUM SERPL-SCNC: 136 MMOL/L (ref 136–145)
TRIGL SERPL-MCNC: 160 MG/DL (ref 30–150)
TSH SERPL DL<=0.005 MIU/L-ACNC: 3.16 UIU/ML (ref 0.4–4)
WBC # BLD AUTO: 7.76 K/UL (ref 3.9–12.7)

## 2022-04-01 PROCEDURE — 84153 ASSAY OF PSA TOTAL: CPT | Performed by: NURSE PRACTITIONER

## 2022-04-01 PROCEDURE — 83036 HEMOGLOBIN GLYCOSYLATED A1C: CPT | Performed by: NURSE PRACTITIONER

## 2022-04-01 PROCEDURE — 80061 LIPID PANEL: CPT | Performed by: NURSE PRACTITIONER

## 2022-04-01 PROCEDURE — 84443 ASSAY THYROID STIM HORMONE: CPT | Performed by: NURSE PRACTITIONER

## 2022-04-01 PROCEDURE — 85025 COMPLETE CBC W/AUTO DIFF WBC: CPT | Performed by: NURSE PRACTITIONER

## 2022-04-01 PROCEDURE — 80053 COMPREHEN METABOLIC PANEL: CPT | Performed by: NURSE PRACTITIONER

## 2022-04-01 PROCEDURE — 99499 UNLISTED E&M SERVICE: CPT | Mod: S$PBB,,, | Performed by: NURSE PRACTITIONER

## 2022-04-01 PROCEDURE — 99499 NO LOS: ICD-10-PCS | Mod: S$PBB,,, | Performed by: NURSE PRACTITIONER

## 2022-04-01 PROCEDURE — 36415 COLL VENOUS BLD VENIPUNCTURE: CPT | Mod: PO | Performed by: NURSE PRACTITIONER

## 2022-04-01 NOTE — PROGRESS NOTES
John E Sandifer 76 y.o. male is here today for Blood Pressure check.   History of HTN yes.    Review of patient's allergies indicates:   Allergen Reactions    Bee sting [allergen ext-venom-honey bee] Swelling    Culver clement (solidago canadensis) Swelling    Kenalog [triamcinolone acetonide]     Lidocaine      Pt doesn't recall the reaction to lidocaine     Creatinine   Date Value Ref Range Status   08/02/2021 0.8 0.5 - 1.4 mg/dL Final     Sodium   Date Value Ref Range Status   08/02/2021 137 136 - 145 mmol/L Final     Potassium   Date Value Ref Range Status   08/02/2021 4.9 3.5 - 5.1 mmol/L Final   ]  Patient verifies taking blood pressure medications on a regular basis at the same time of the day.     Current Outpatient Medications:     albuterol (PROVENTIL) 2.5 mg /3 mL (0.083 %) nebulizer solution, Take 2.5 mg by nebulization every 6 (six) hours as needed for Wheezing. Rescue, Disp: , Rfl:     allopurinol (ZYLOPRIM) 100 MG tablet, TAKE 1 TABLET BY MOUTH EVERY DAY, Disp: 90 tablet, Rfl: 3    amLODIPine (NORVASC) 10 MG tablet, TAKE 1 TABLET BY MOUTH EVERY DAY, Disp: 90 tablet, Rfl: 3    diclofenac sodium (VOLTAREN) 1 % Gel, APPLY 2 GRAMS TOPICALLY TO THE AFFECTED AREA EVERY DAY, Disp: 100 g, Rfl: 1    fluticasone-salmeterol diskus inhaler 250-50 mcg, Inhale 1 puff into the lungs 2 (two) times daily. INHALE 1 PUFF BY MOUTH INTO THE LUNGS TWICE DAILY, Disp: 180 each, Rfl: 3    furosemide (LASIX) 20 MG tablet, Take 1 tablet (20 mg total) by mouth 3 (three) times daily as needed (leg swelling)., Disp: 90 tablet, Rfl: 11    gabapentin (NEURONTIN) 400 MG capsule, Take 1 capsule (400 mg total) by mouth 2 (two) times daily., Disp: 180 capsule, Rfl: 3    losartan (COZAAR) 100 MG tablet, TAKE 1 TABLET BY MOUTH EVERY DAY, Disp: 90 tablet, Rfl: 3    metoprolol tartrate (LOPRESSOR) 100 MG tablet, Take 0.5 tablets (50 mg total) by mouth 2 (two) times daily., Disp: 60 tablet, Rfl: 3    omeprazole (PRILOSEC) 20 MG  capsule, Take 1 capsule (20 mg total) by mouth 2 (two) times a day., Disp: 180 capsule, Rfl: 3    pravastatin (PRAVACHOL) 40 MG tablet, TAKE 1 TABLET(40 MG) BY MOUTH EVERY DAY, Disp: 90 tablet, Rfl: 3  No current facility-administered medications for this visit.    Facility-Administered Medications Ordered in Other Visits:     lactated ringers infusion, , Intravenous, Continuous, Logan Steele MD, Stopped at 06/07/19 9019  Does patient have record of home blood pressure readings no. Readings have been averaging  - no readings.   Last dose of blood pressure medication was taken at 4/1/2022@7:00 am.  Patient is asymptomatic.   Complains of  Shortness of breath due to COPD.    134/80 right arm, sitting, manual       NP Lilibeth  informed of nurse visit.

## 2022-04-04 ENCOUNTER — TELEPHONE (OUTPATIENT)
Dept: FAMILY MEDICINE | Facility: CLINIC | Age: 77
End: 2022-04-04
Payer: MEDICARE

## 2022-04-05 DIAGNOSIS — Z71.89 COMPLEX CARE COORDINATION: ICD-10-CM

## 2022-04-20 ENCOUNTER — TELEPHONE (OUTPATIENT)
Dept: FAMILY MEDICINE | Facility: CLINIC | Age: 77
End: 2022-04-20
Payer: MEDICARE

## 2022-05-04 ENCOUNTER — HOSPITAL ENCOUNTER (OUTPATIENT)
Dept: WOUND CARE | Facility: HOSPITAL | Age: 77
Discharge: HOME | End: 2022-05-04
Attending: FAMILY MEDICINE
Payer: MEDICARE

## 2022-05-04 VITALS — SYSTOLIC BLOOD PRESSURE: 174 MMHG | HEART RATE: 90 BPM | DIASTOLIC BLOOD PRESSURE: 72 MMHG | TEMPERATURE: 98 F

## 2022-05-04 DIAGNOSIS — I87.2 VENOUS STASIS ULCER OF OTHER PART OF RIGHT LOWER LEG WITH FAT LAYER EXPOSED WITHOUT VARICOSE VEINS: ICD-10-CM

## 2022-05-04 DIAGNOSIS — L97.812 VENOUS STASIS ULCER OF OTHER PART OF RIGHT LOWER LEG WITH FAT LAYER EXPOSED WITHOUT VARICOSE VEINS: ICD-10-CM

## 2022-05-04 PROCEDURE — 99214 OFFICE O/P EST MOD 30 MIN: CPT | Mod: 25,,, | Performed by: FAMILY MEDICINE

## 2022-05-04 PROCEDURE — 11042 DBRDMT SUBQ TIS 1ST 20SQCM/<: CPT | Mod: ,,, | Performed by: FAMILY MEDICINE

## 2022-05-04 PROCEDURE — 99214 PR OFFICE/OUTPT VISIT, EST, LEVL IV, 30-39 MIN: ICD-10-PCS | Mod: 25,,, | Performed by: FAMILY MEDICINE

## 2022-05-04 PROCEDURE — 11042 DEBRIDEMENT: ICD-10-PCS | Mod: ,,, | Performed by: FAMILY MEDICINE

## 2022-05-04 PROCEDURE — 27201912 HC WOUND CARE DEBRIDEMENT SUPPLIES: Performed by: FAMILY MEDICINE

## 2022-05-04 PROCEDURE — 11042 DBRDMT SUBQ TIS 1ST 20SQCM/<: CPT | Performed by: FAMILY MEDICINE

## 2022-05-04 NOTE — PROGRESS NOTES
"[facility]                Progress Note    Subjective:       Patient ID: John E Sandifer is a 76 y.o. male.    Chief Complaint: No chief complaint on file.    Walked to clinic with aid of cane.  [place] but understands if voice raised. States 3 months ago had waxing and waning edema which when resolved left scaly skin. He "picked at dry skin" and caused a wound on left leg. Wound was initially cleaned and abx cream applied then saw PCP and started on Amoxacillin of which he has 3 days left. Wound is covered with black fibrin like substance that when removed revealed red wound bed which is smaller and now 2 x 0.6 x 0.3. Will leave dsg on for one week. Discussed diet has adequate protein but poor vegetable intake. Discussed how diet may help with healing process.      This is an established patient in wound care.   Patient presents in the office today for evaluation of the chronic wound.  Updated HPI is noted below.    The periwound appears non-erythematous, no maceration noted, edematous    The wound appears red, fat layer exposed. Fibrin, slough and eschar on wound bed. No odor. Serous drainage.     Patient denies fever, chills    Patients reports no pain.     Lymphedema status is contributory to wound status    Pain at the site of the wound is aching    Contributing factors to current wound state include numerous comorbid conditions, Hypertension, off-loading limitations, Lymphedema      Review of Systems   Constitutional: Negative for activity change, appetite change and fever.   HENT: Negative for congestion.    Respiratory: Negative for shortness of breath and wheezing.    Cardiovascular: Negative for chest pain and leg swelling.   Gastrointestinal: Negative for abdominal pain, nausea and vomiting.   Skin: Positive for wound.   Neurological: Negative for dizziness and headaches.   Psychiatric/Behavioral: The patient is not nervous/anxious.          Objective:      "   Physical Exam  Vitals and nursing note reviewed.   Constitutional:       Appearance: He is well-developed.   HENT:      Head: Normocephalic and atraumatic.   Eyes:      Conjunctiva/sclera: Conjunctivae normal.   Pulmonary:      Effort: Pulmonary effort is normal.   Abdominal:      General: There is no distension.      Palpations: Abdomen is soft.   Musculoskeletal:         General: Normal range of motion.      Cervical back: Normal range of motion and neck supple.   Skin:     Comments: See wound description   Neurological:      Mental Status: He is alert and oriented to person, place, and time.   Psychiatric:         Behavior: Behavior normal.           Vitals:    05/04/22 0916   BP: (!) 174/72   Pulse: 90   Temp: 97.7 °F (36.5 °C)       Assessment:           ICD-10-CM ICD-9-CM   1. Venous stasis ulcer of other part of right lower leg with fat layer exposed without varicose veins  I87.2 459.81    L97.812 707.19            Wound 05/04/22 0921 Abrasion(s) Right anterior;lower Leg (Active)   05/04/22 0921    Pre-existing: Yes   Primary Wound Type: Abrasion(s)   Side: Right   Orientation: anterior;lower   Location: Leg   Wound Number:    Ankle-Brachial Index:    Pulses:    Removal Indication and Assessment:    Wound Outcome:    (Retired) Wound Type:    (Retired) Wound Length (cm):    (Retired) Wound Width (cm):    (Retired) Depth (cm):    Wound Description (Comments):    Removal Indications:    Wound Image    05/04/22 0900   Wound WDL ex 05/04/22 0900   Dressing Appearance Open to air 05/04/22 0900   Drainage Amount Scant 05/04/22 0900   Drainage Characteristics/Odor Serosanguineous 05/04/22 0900   Appearance Dry 05/04/22 0900   Tissue loss description Partial thickness 05/04/22 0900   Black (%), Wound Tissue Color 100 % 05/04/22 0900   Red (%), Wound Tissue Color 100 % 05/04/22 0900   Periwound Area Intact;Dry 05/04/22 0900   Wound Edges Irregular 05/04/22 0900   Wound Length (cm) 2 cm 05/04/22 0900   Wound Width  (cm) 0.6 cm 05/04/22 0900   Wound Depth (cm) 0.3 cm 05/04/22 0900   Wound Volume (cm^3) 0.36 cm^3 05/04/22 0900   Wound Surface Area (cm^2) 1.2 cm^2 05/04/22 0900   Care Cleansed with:;Antimicrobial agent;Sterile normal saline 05/04/22 0900   Dressing Applied 05/04/22 0900   Periwound Care Skin barrier film applied 05/04/22 0900   Dressing Change Due 05/11/22 05/04/22 0900           Debridement    Date/Time: 5/4/2022 8:43 AM  Performed by: Kristen Ho MD  Authorized by: Kristen Ho MD     Consent Done?:  Yes (Verbal)  Local anesthesia used?: Yes    Local anesthetic:  Topical anesthetic    Wound Details:    Location:  Right leg    Type of Debridement:  Excisional       Length (cm):  2       Area (sq cm):  1.2       Width (cm):  0.6       Percent Debrided (%):  100       Depth (cm):  0.3       Total Area Debrided (sq cm):  1.2    Depth of debridement:  Subcutaneous tissue    Tissue debrided:  Subcutaneous    Devitalized tissue debrided:  Slough, Fibrin and Necrotic/Eschar    Instruments:  Curette    Bleeding:  Minimal  Hemostasis Achieved: Yes    Method Used:  Pressure  Patient tolerance:  Patient tolerated the procedure well with no immediate complications      Plan:            1. Debridement needed and Done today.   2. Recommend increase protein   3. Recommend leg elevation   4. CAMMIE   5. Keep dressing clean and intact  6. Continue with wound care orders and plan as noted in orders.   7. Continue to follow current medication regimen as per pcp   8. Call for any questions / concerns.         Orders Placed This Encounter   Procedures    Ambulatory referral/consult to Wound Clinic     Standing Status:   Standing     Number of Occurrences:   1     Referral Priority:   Routine     Referral Type:   Consultation     Referral Reason:   Specialty Services Required     Requested Specialty:   Wound Care     Number of Visits Requested:   1    Change dressing     Wound Dressing Orders    Dressing change frequency  weekly  Remove old dressing   Protect periwound with:  Cavilon  Primary dressing - adjust to fit: Jacqueline  Secondary dressing: Bordered gauze  Off-load:   Compression: Tubigrip in E  Other:                care    Ankle Brachial Indices (CAMMIE)     Standing Status:   Future     Standing Expiration Date:   5/4/2023     Order Specific Question:   Exam Type:     Answer:   Resting     Order Specific Question:   Release to patient     Answer:   Immediate        Follow up in about 1 week (around 5/11/2022).

## 2022-05-11 ENCOUNTER — HOSPITAL ENCOUNTER (OUTPATIENT)
Dept: WOUND CARE | Facility: HOSPITAL | Age: 77
Discharge: HOME | End: 2022-05-11
Attending: FAMILY MEDICINE
Payer: MEDICARE

## 2022-05-11 VITALS
DIASTOLIC BLOOD PRESSURE: 73 MMHG | RESPIRATION RATE: 20 BRPM | HEART RATE: 74 BPM | SYSTOLIC BLOOD PRESSURE: 169 MMHG | TEMPERATURE: 97 F

## 2022-05-11 DIAGNOSIS — L97.812 VENOUS STASIS ULCER OF OTHER PART OF RIGHT LOWER LEG WITH FAT LAYER EXPOSED WITHOUT VARICOSE VEINS: Primary | ICD-10-CM

## 2022-05-11 DIAGNOSIS — I87.2 VENOUS STASIS ULCER OF OTHER PART OF RIGHT LOWER LEG WITH FAT LAYER EXPOSED WITHOUT VARICOSE VEINS: Primary | ICD-10-CM

## 2022-05-11 PROCEDURE — 27201912 HC WOUND CARE DEBRIDEMENT SUPPLIES: Performed by: FAMILY MEDICINE

## 2022-05-11 PROCEDURE — 11042 DEBRIDEMENT: ICD-10-PCS | Mod: ,,, | Performed by: FAMILY MEDICINE

## 2022-05-11 PROCEDURE — 11042 DBRDMT SUBQ TIS 1ST 20SQCM/<: CPT | Mod: ,,, | Performed by: FAMILY MEDICINE

## 2022-05-11 PROCEDURE — 11042 DBRDMT SUBQ TIS 1ST 20SQCM/<: CPT | Performed by: FAMILY MEDICINE

## 2022-05-11 NOTE — PROGRESS NOTES
[facility]                Progress Note    Subjective:       Patient ID: John E Sandifer is a 76 y.o. male.    Chief Complaint: Wound Check    F/u wound care visit. Patient ambulatory with cane to exam room with no difficulty noted. Patient denies pain or discomfort at present. Wound dressing to RLE intact with moderate amount of drainage noted. Wound to right anterior lateral lower leg measuring 0.3cm larger length, 0.1cm larger width and 0.1cm smaller depth. Wound care done per order. RTC in one week.    This is an established patient in wound care.   Patient presents in the office today for evaluation of the chronic wound.  Updated HPI is noted below.    The periwound appears non-erythematous, no maceration noted, non-edematous    The wound appears wound healing    Patient denies fever, chills    Patients reports wound pain    Lymphedema status is contributory to wound status    Pain at the site of the wound is aching    Contributing factors to current wound state include numerous comorbid conditions, off-loading limitations      Review of Systems   Constitutional: Negative for activity change, appetite change and fever.   HENT: Negative for congestion.    Respiratory: Negative for shortness of breath and wheezing.    Cardiovascular: Negative for chest pain and leg swelling.   Gastrointestinal: Negative for abdominal pain, nausea and vomiting.   Skin: Positive for wound.   Neurological: Negative for dizziness and headaches.   Psychiatric/Behavioral: The patient is not nervous/anxious.          Objective:        Physical Exam  Vitals and nursing note reviewed.   Constitutional:       Appearance: He is well-developed.   HENT:      Head: Normocephalic and atraumatic.   Eyes:      Conjunctiva/sclera: Conjunctivae normal.   Pulmonary:      Effort: Pulmonary effort is normal.   Abdominal:      General: There is no distension.      Palpations: Abdomen is soft.    Musculoskeletal:         General: Normal range of motion.      Cervical back: Normal range of motion and neck supple.   Skin:     Comments: See wound description   Neurological:      Mental Status: He is alert and oriented to person, place, and time.   Psychiatric:         Behavior: Behavior normal.           Vitals:    05/11/22 0925   BP: (!) 169/73   Pulse: 74   Resp: 20   Temp: 97.3 °F (36.3 °C)       Assessment:           ICD-10-CM ICD-9-CM   1. Venous stasis ulcer of other part of right lower leg with fat layer exposed without varicose veins  I87.2 459.81    L97.812 707.19            Wound 05/04/22 0921 Abrasion(s) Right anterior;lower Leg (Active)   05/04/22 0921    Pre-existing: Yes   Primary Wound Type: Abrasion(s)   Side: Right   Orientation: anterior;lower   Location: Leg   Wound Number:    Ankle-Brachial Index:    Pulses:    Removal Indication and Assessment:    Wound Outcome:    (Retired) Wound Type:    (Retired) Wound Length (cm):    (Retired) Wound Width (cm):    (Retired) Depth (cm):    Wound Description (Comments):    Removal Indications:    Wound Image   05/11/22 0900   Wound WDL ex 05/11/22 0900   Dressing Appearance Intact;Moist drainage 05/11/22 0900   Drainage Amount Moderate 05/11/22 0900   Drainage Characteristics/Odor Serosanguineous 05/11/22 0900   Appearance Red;Granulating 05/11/22 0900   Tissue loss description Partial thickness 05/11/22 0900   Black (%), Wound Tissue Color 0 % 05/11/22 0900   Red (%), Wound Tissue Color 100 % 05/11/22 0900   Yellow (%), Wound Tissue Color 0 % 05/11/22 0900   Periwound Area Macerated 05/11/22 0900   Wound Edges Defined 05/11/22 0900   Wound Length (cm) 2.3 cm 05/11/22 0900   Wound Width (cm) 0.7 cm 05/11/22 0900   Wound Depth (cm) 0.2 cm 05/11/22 0900   Wound Volume (cm^3) 0.322 cm^3 05/11/22 0900   Wound Surface Area (cm^2) 1.61 cm^2 05/11/22 0900   Care Cleansed with:;Soap and water;Sterile normal saline 05/11/22 0900   Dressing Changed 05/11/22 0900    Periwound Care Moisture barrier applied 05/11/22 0900   Compression Tubular elasticized bandage 05/11/22 0900   Dressing Change Due 05/18/22 05/11/22 0900           Debridement    Date/Time: 5/11/2022 8:55 AM  Performed by: Kristen Ho MD  Authorized by: Kristen Ho MD     Consent Done?:  Yes (Verbal)  Local anesthesia used?: Yes      Wound Details:    Location:  Right leg    Type of Debridement:  Excisional       Length (cm):  2.3       Area (sq cm):  1.61       Width (cm):  0.7       Percent Debrided (%):  100       Depth (cm):  0.2       Total Area Debrided (sq cm):  1.61    Depth of debridement:  Subcutaneous tissue    Devitalized tissue debrided:  Slough    Instruments:  Curette    Bleeding:  Minimal  Hemostasis Achieved: Yes    Method Used:  Pressure  Patient tolerance:  Patient tolerated the procedure well with no immediate complications      Plan:            1. Debridement needed and Done today.   2. Recommend leg elevation   3. Recommend off-loading   4. Decrease salt intake  5. Recommend improvement in bp   6. Keep dressing clean and intact  7. Continue with wound care orders and plan as noted in orders.   8. Continue to follow current medication regimen as per pcp   9. Call for any questions / concerns.         Orders Placed This Encounter   Procedures    Change dressing     Wound Dressing Orders    Dressing change frequency once a week  Remove old dressing  Cleanse or irrigate with: Normal Saline  Protect periwound with:  periwound: Gentian Violet   Primary dressing - adjust to fit: WOUND BASE: Endoform  Secondary dressing: Mextra short x1, secure with medipore tape  Off-load: N/A  Compression: Tubi- double layer size E        Follow up in about 1 week (around 5/18/2022) for wound care visit.

## 2022-05-19 ENCOUNTER — HOSPITAL ENCOUNTER (OUTPATIENT)
Dept: WOUND CARE | Facility: HOSPITAL | Age: 77
Discharge: HOME | End: 2022-05-19
Attending: FAMILY MEDICINE
Payer: MEDICARE

## 2022-05-19 DIAGNOSIS — I87.2 VENOUS STASIS ULCER OF OTHER PART OF RIGHT LOWER LEG LIMITED TO BREAKDOWN OF SKIN WITHOUT VARICOSE VEINS: Primary | ICD-10-CM

## 2022-05-19 DIAGNOSIS — I87.2 VENOUS STASIS DERMATITIS OF BOTH LOWER EXTREMITIES: ICD-10-CM

## 2022-05-19 DIAGNOSIS — L97.811 VENOUS STASIS ULCER OF OTHER PART OF RIGHT LOWER LEG LIMITED TO BREAKDOWN OF SKIN WITHOUT VARICOSE VEINS: Primary | ICD-10-CM

## 2022-05-19 PROCEDURE — 99214 OFFICE O/P EST MOD 30 MIN: CPT | Mod: ,,, | Performed by: FAMILY MEDICINE

## 2022-05-19 PROCEDURE — 99215 OFFICE O/P EST HI 40 MIN: CPT | Performed by: FAMILY MEDICINE

## 2022-05-19 PROCEDURE — 99214 PR OFFICE/OUTPT VISIT, EST, LEVL IV, 30-39 MIN: ICD-10-PCS | Mod: ,,, | Performed by: FAMILY MEDICINE

## 2022-05-19 NOTE — PROGRESS NOTES
Subjective:       Patient ID: John E Sandifer is a 76 y.o. male.    Chief Complaint: No chief complaint on file.    f/u wound care visit. Patient ambulatory with cane to exam room with no difficulty noted. Patient denies pain or discomfort at present. Wound dressing to RLE intact with moderate amount of drainage noted. Wound to right anterior lateral lower leg measuring 0.3cm larger length, 0.1cm larger width and 0.1cm smaller depth. Wound care done per order. RTC in one week.    Review of Systems   Constitutional: Negative.    HENT: Negative.    Eyes: Negative.    Respiratory: Negative.    Cardiovascular: Negative.    Musculoskeletal: Negative.    Skin: Positive for wound.   Neurological: Negative.    Hematological: Negative.    All other systems reviewed and are negative.      Objective:      Physical Exam  Vitals reviewed.   Constitutional:       Appearance: He is well-developed.   HENT:      Head: Normocephalic and atraumatic.   Eyes:      Conjunctiva/sclera: Conjunctivae normal.      Pupils: Pupils are equal, round, and reactive to light.   Musculoskeletal:      Cervical back: Normal range of motion.   Skin:     General: Skin is warm and dry.      Comments: Please see wound description   Neurological:      Mental Status: He is alert and oriented to person, place, and time.   Psychiatric:         Behavior: Behavior normal.         Assessment:       1. Venous stasis ulcer, unspecified site, unspecified ulcer stage, unspecified whether varicose veins present    2. Venous stasis dermatitis of both lower extremities           Wound 05/04/22 0921 Abrasion(s) Right anterior;lower Leg (Active)   05/04/22 0921    Pre-existing: Yes   Primary Wound Type: Abrasion(s)   Side: Right   Orientation: anterior;lower   Location: Leg   Wound Number:    Ankle-Brachial Index:    Pulses:    Removal Indication and Assessment:    Wound Outcome:    (Retired) Wound Type:    (Retired) Wound Length (cm):    (Retired) Wound Width (cm):     (Retired) Depth (cm):    Wound Description (Comments):    Removal Indications:    Wound Image   05/19/22 1500   Wound WDL ex 05/19/22 1500   Dressing Appearance Dry;Intact 05/19/22 1500   Drainage Amount Scant 05/19/22 1500   Drainage Characteristics/Odor Serosanguineous 05/19/22 1500   Appearance Red;Hypergranulation 05/19/22 1500   Tissue loss description Partial thickness 05/19/22 1500   Red (%), Wound Tissue Color 100 % 05/19/22 1500   Periwound Area Intact;Dry 05/19/22 1500   Wound Edges Defined 05/19/22 1500   Wound Length (cm) 1.5 cm 05/19/22 1500   Wound Width (cm) 0.4 cm 05/19/22 1500   Wound Depth (cm) 0.2 cm 05/19/22 1500   Wound Volume (cm^3) 0.12 cm^3 05/19/22 1500   Wound Surface Area (cm^2) 0.6 cm^2 05/19/22 1500   Care Cleansed with:;Antimicrobial agent;Sterile normal saline 05/19/22 1500   Dressing Changed 05/19/22 1500   Compression Tubular elasticized bandage 05/19/22 1500   Dressing Change Due 05/26/22 05/19/22 1500           Plan:            1. Debridement not needed and Not Done today.   2. Continue off-loading / leg elevation   3. Continue eating protein   4. Keep dressing clean and intact  5. Continue with wound care orders and plan as noted in orders.   6. Continue to follow current medication regimen as per pcp   7. Call for any questions / concerns.

## 2022-05-25 ENCOUNTER — HOSPITAL ENCOUNTER (OUTPATIENT)
Dept: WOUND CARE | Facility: HOSPITAL | Age: 77
Discharge: HOME | End: 2022-05-25
Attending: FAMILY MEDICINE
Payer: MEDICARE

## 2022-05-25 VITALS
RESPIRATION RATE: 20 BRPM | SYSTOLIC BLOOD PRESSURE: 177 MMHG | DIASTOLIC BLOOD PRESSURE: 83 MMHG | HEART RATE: 58 BPM | TEMPERATURE: 98 F

## 2022-05-25 DIAGNOSIS — I87.2 VENOUS STASIS ULCER OF OTHER PART OF RIGHT LOWER LEG LIMITED TO BREAKDOWN OF SKIN WITHOUT VARICOSE VEINS: Primary | ICD-10-CM

## 2022-05-25 DIAGNOSIS — L97.811 VENOUS STASIS ULCER OF OTHER PART OF RIGHT LOWER LEG LIMITED TO BREAKDOWN OF SKIN WITHOUT VARICOSE VEINS: Primary | ICD-10-CM

## 2022-05-25 PROCEDURE — 17250 CHEM CAUT OF GRANLTJ TISSUE: CPT | Performed by: FAMILY MEDICINE

## 2022-05-25 PROCEDURE — 99214 PR OFFICE/OUTPT VISIT, EST, LEVL IV, 30-39 MIN: ICD-10-PCS | Mod: 25,,, | Performed by: FAMILY MEDICINE

## 2022-05-25 PROCEDURE — 99214 OFFICE O/P EST MOD 30 MIN: CPT | Mod: 25,,, | Performed by: FAMILY MEDICINE

## 2022-05-25 NOTE — PROGRESS NOTES
[facility]                Progress Note    Subjective:       Patient ID: John E Sandifer is a 76 y.o. male.    Chief Complaint: Wound Check    F/u wound care visit. Patient ambulatory with cane to exam room with no difficulty noted. Patient denies pain or discomfort at present. Wound dressing to RLE intact with small amount of drainage noted. Top of tubigrip noted to slip to upper calf area causing 3+ pitting edema just above calf to knee. Patient instructed to make sure tubigrip remains in place to prevent edema, patient verbalized understanding. Wound to right lateral lower leg measuring 0.1cm small width and depth. Wound care done per order. RTC in one week.    This is an established patient in wound care.   Patient presents in the office today for evaluation of the chronic wound.  Updated HPI is noted below.    The periwound appears non-erythematous, no maceration noted, edematous    The wound appears wound healing; good granulation tissue. Some areas of hypergranulation. No odor. No drainage. Minimal slough.     Patient denies fever, chills    Patients reports wound pain    Lymphedema status is contributory to wound status    Pain at the site of the wound is aching    Contributing factors to current wound state include numerous comorbid conditions, Lymphedema      Review of Systems   Constitutional: Negative for activity change, appetite change and fever.   HENT: Negative for congestion.    Respiratory: Negative for shortness of breath and wheezing.    Cardiovascular: Negative for chest pain and leg swelling.   Gastrointestinal: Negative for abdominal pain, nausea and vomiting.   Skin: Positive for wound.   Neurological: Negative for dizziness and headaches.   Psychiatric/Behavioral: The patient is not nervous/anxious.          Objective:        Physical Exam  Vitals and nursing note reviewed.   Constitutional:       Appearance: He is well-developed.   HENT:       Head: Normocephalic and atraumatic.   Eyes:      Conjunctiva/sclera: Conjunctivae normal.   Pulmonary:      Effort: Pulmonary effort is normal.   Abdominal:      General: There is no distension.      Palpations: Abdomen is soft.   Musculoskeletal:         General: Normal range of motion.      Cervical back: Normal range of motion and neck supple.   Skin:     Comments: See wound description   Neurological:      Mental Status: He is alert and oriented to person, place, and time.   Psychiatric:         Behavior: Behavior normal.         Vitals:    05/25/22 0807   BP: (!) 177/83   Pulse: (!) 58   Resp: 20   Temp: 97.7 °F (36.5 °C)       Assessment:           ICD-10-CM ICD-9-CM   1. Venous stasis ulcer of other part of right lower leg limited to breakdown of skin without varicose veins  I87.2 459.81    L97.811 707.15            Wound 05/04/22 0921 Abrasion(s) Right anterior;lower Leg (Active)   05/04/22 0921    Pre-existing: Yes   Primary Wound Type: Abrasion(s)   Side: Right   Orientation: anterior;lower   Location: Leg   Wound Number:    Ankle-Brachial Index:    Pulses:    Removal Indication and Assessment:    Wound Outcome:    (Retired) Wound Type:    (Retired) Wound Length (cm):    (Retired) Wound Width (cm):    (Retired) Depth (cm):    Wound Description (Comments):    Removal Indications:    Wound Image   05/25/22 0800   Wound WDL ex 05/25/22 0800   Dressing Appearance Intact;Moist drainage 05/25/22 0800   Drainage Amount Small 05/25/22 0800   Drainage Characteristics/Odor Serosanguineous 05/25/22 0800   Appearance Red;Granulating 05/25/22 0800   Tissue loss description Partial thickness 05/25/22 0800   Black (%), Wound Tissue Color 0 % 05/25/22 0800   Red (%), Wound Tissue Color 100 % 05/25/22 0800   Yellow (%), Wound Tissue Color 0 % 05/25/22 0800   Periwound Area Intact;Dry 05/25/22 0800   Wound Edges Defined 05/25/22 0800   Wound Length (cm) 1.5 cm 05/25/22 0800   Wound Width (cm) 0.3 cm 05/25/22 0800   Wound  Depth (cm) 0.1 cm 05/25/22 0800   Wound Volume (cm^3) 0.045 cm^3 05/25/22 0800   Wound Surface Area (cm^2) 0.45 cm^2 05/25/22 0800   Care Cleansed with:;Soap and water;Sterile normal saline 05/25/22 0800   Dressing Changed 05/25/22 0800   Compression Tubular elasticized bandage 05/25/22 0800   Dressing Change Due 06/01/22 05/25/22 0800           Plan:            1. Chemical cauterization with silver nitrate stick x 1 of wound bed done in clinic for treatment of hypergranulation or to decrease biofilm burden. Patient tolerated procedure well.   2. Recommend off-loading and leg elevation   3. Recommend increasing protein   4. Decrease salt intake.   5. Keep dressing clean and intact  6. Continue with wound care orders and plan as noted in orders.   7. Continue to follow current medication regimen as per pcp   8. Call for any questions / concerns.         Orders Placed This Encounter   Procedures    Change dressing     Wound Dressing Orders     Dressing change frequency once a week   Remove old dressing   Cleanse or irrigate with: Normal Saline   Protect periwound with:  periwound: Cavilon  Primary dressing - adjust to fit: WOUND BASE: Promogran   Secondary dressing:Mepilex border  Off-load: N/A   Compression: Tubi- double layer size E (left calf measurses 36cm)        Follow up in about 1 week (around 6/1/2022) for wound care visit.

## 2022-06-01 ENCOUNTER — HOSPITAL ENCOUNTER (OUTPATIENT)
Dept: WOUND CARE | Facility: HOSPITAL | Age: 77
Discharge: HOME | End: 2022-06-01
Attending: FAMILY MEDICINE
Payer: MEDICARE

## 2022-06-01 DIAGNOSIS — L97.909 STASIS ULCER: Primary | ICD-10-CM

## 2022-06-01 DIAGNOSIS — I83.009 STASIS ULCER: Primary | ICD-10-CM

## 2022-06-01 PROCEDURE — 11042 DBRDMT SUBQ TIS 1ST 20SQCM/<: CPT | Performed by: FAMILY MEDICINE

## 2022-06-01 PROCEDURE — 11042 DBRDMT SUBQ TIS 1ST 20SQCM/<: CPT | Mod: ,,, | Performed by: FAMILY MEDICINE

## 2022-06-01 PROCEDURE — 99214 PR OFFICE/OUTPT VISIT, EST, LEVL IV, 30-39 MIN: ICD-10-PCS | Mod: 25,,, | Performed by: FAMILY MEDICINE

## 2022-06-01 PROCEDURE — 11042 DEBRIDEMENT: ICD-10-PCS | Mod: ,,, | Performed by: FAMILY MEDICINE

## 2022-06-01 PROCEDURE — 99214 OFFICE O/P EST MOD 30 MIN: CPT | Mod: 25,,, | Performed by: FAMILY MEDICINE

## 2022-06-08 ENCOUNTER — HOSPITAL ENCOUNTER (OUTPATIENT)
Dept: WOUND CARE | Facility: HOSPITAL | Age: 77
Discharge: HOME | End: 2022-06-08
Attending: FAMILY MEDICINE
Payer: MEDICARE

## 2022-06-08 VITALS — HEART RATE: 72 BPM | DIASTOLIC BLOOD PRESSURE: 80 MMHG | TEMPERATURE: 98 F | SYSTOLIC BLOOD PRESSURE: 161 MMHG

## 2022-06-08 DIAGNOSIS — I87.2 VENOUS STASIS ULCER OF OTHER PART OF RIGHT LOWER LEG LIMITED TO BREAKDOWN OF SKIN WITHOUT VARICOSE VEINS: Primary | ICD-10-CM

## 2022-06-08 DIAGNOSIS — L97.811 VENOUS STASIS ULCER OF OTHER PART OF RIGHT LOWER LEG LIMITED TO BREAKDOWN OF SKIN WITHOUT VARICOSE VEINS: Primary | ICD-10-CM

## 2022-06-08 PROCEDURE — 11042 DBRDMT SUBQ TIS 1ST 20SQCM/<: CPT | Mod: ,,, | Performed by: FAMILY MEDICINE

## 2022-06-08 PROCEDURE — 99499 UNLISTED E&M SERVICE: CPT | Mod: ,,, | Performed by: FAMILY MEDICINE

## 2022-06-08 PROCEDURE — 99499 NO LOS: ICD-10-PCS | Mod: ,,, | Performed by: FAMILY MEDICINE

## 2022-06-08 PROCEDURE — 11042 DEBRIDEMENT: ICD-10-PCS | Mod: ,,, | Performed by: FAMILY MEDICINE

## 2022-06-08 PROCEDURE — 11042 DBRDMT SUBQ TIS 1ST 20SQCM/<: CPT | Performed by: FAMILY MEDICINE

## 2022-06-08 NOTE — PROGRESS NOTES
[facility]                Progress Note    Subjective:       Patient ID: John E Sandifer is a 76 y.o. male.    Chief Complaint: Wound Check    Follow up wound care visit. Patient walked  to clinic using walking cane. No c/o pain to wound at present. Dressing intact to Right Lateral Lower Leg with a scant amount of serous drainage noted. Wound measures the same as previous, but has a beefy red wound bed. Right Fourth Toe appears healed, open to air.     Wound care done as per order, RTC in 1 week.    This is an established patient in wound care.   Patient presents in the office today for evaluation of the chronic wound.  Updated HPI is noted below.    The periwound appears non-erythematous, no maceration noted, edematous    The wound appears wound healing    Patient denies wound pain    Patients reports wound pain    Lymphedema status is contributory to wound status    Pain at the site of the wound is aching    Contributing factors to current wound state include numerous comorbid conditions    Review of Systems   Constitutional: Negative for activity change, appetite change and fever.   HENT: Negative for congestion.    Respiratory: Negative for shortness of breath and wheezing.    Cardiovascular: Negative for chest pain and leg swelling.   Gastrointestinal: Negative for abdominal pain, nausea and vomiting.   Skin: Positive for wound.   Neurological: Negative for dizziness and headaches.   Psychiatric/Behavioral: The patient is not nervous/anxious.          Objective:        Physical Exam      Vitals:    06/08/22 0850   BP: (!) 161/80   Pulse: 72   Temp: 97.5 °F (36.4 °C)       Assessment:           ICD-10-CM ICD-9-CM   1. Venous stasis ulcer of other part of right lower leg limited to breakdown of skin without varicose veins  I87.2 459.81    L97.811 707.15            Wound 05/04/22 0921 Abrasion(s) Right anterior;lower Leg (Active)   05/04/22 0921    Pre-existing: Yes    Primary Wound Type: Abrasion(s)   Side: Right   Orientation: anterior;lower   Location: Leg   Wound Number:    Ankle-Brachial Index:    Pulses:    Removal Indication and Assessment:    Wound Outcome:    (Retired) Wound Type:    (Retired) Wound Length (cm):    (Retired) Wound Width (cm):    (Retired) Depth (cm):    Wound Description (Comments):    Removal Indications:    Wound Image    06/08/22 0800   Wound WDL ex 06/08/22 0800   Dressing Appearance Intact;Dried drainage 06/08/22 0800   Drainage Amount Scant 06/08/22 0800   Drainage Characteristics/Odor Serous;No odor 06/08/22 0800   Appearance Red;Moist 06/08/22 0800   Tissue loss description Partial thickness 06/08/22 0800   Red (%), Wound Tissue Color 100 % 06/08/22 0800   Periwound Area Dry;Intact 06/08/22 0800   Wound Edges Open 06/08/22 0800   Wound Length (cm) 1 cm 06/08/22 0800   Wound Width (cm) 0.3 cm 06/08/22 0800   Wound Depth (cm) 0.1 cm 06/08/22 0800   Wound Volume (cm^3) 0.03 cm^3 06/08/22 0800   Wound Surface Area (cm^2) 0.3 cm^2 06/08/22 0800   Care Cleansed with:;Antimicrobial agent;Sterile normal saline 06/08/22 0800   Dressing Changed 06/08/22 0800   Periwound Care Skin barrier film applied 06/08/22 0800   Compression Tubular elasticized bandage 06/08/22 0800   Dressing Change Due 06/15/22 06/08/22 0800       [REMOVED]      Wound 06/01/22 0938 Traumatic Right distal Toe, fourth (Removed)   06/01/22 0938    Pre-existing: Yes   Primary Wound Type: Traumatic   Side: Right   Orientation: distal   Location: Toe, fourth   Wound Number:    Ankle-Brachial Index:    Pulses:    Removal Indication and Assessment:    Wound Outcome: Healed   (Retired) Wound Type:    (Retired) Wound Length (cm):    (Retired) Wound Width (cm):    (Retired) Depth (cm):    Wound Description (Comments):    Removal Indications:    Removed 06/08/22    Wound Image   06/08/22 0800   Wound WDL ex 06/08/22 0800   Dressing Appearance Open to air 06/08/22 0800   Drainage Amount None  06/08/22 0800   Appearance Closed/resurfaced 06/08/22 0800   Tissue loss description Not applicable 06/08/22 0800   Wound Length (cm) 0 cm 06/08/22 0800   Wound Width (cm) 0 cm 06/08/22 0800   Wound Depth (cm) 0 cm 06/08/22 0800   Wound Volume (cm^3) 0 cm^3 06/08/22 0800   Wound Surface Area (cm^2) 0 cm^2 06/08/22 0800   Tunneling (depth (cm)/location) 0 06/08/22 0800   Undermining (depth (cm)/location) 0 06/08/22 0800   Care Cleansed with:;Antimicrobial agent;Sterile normal saline 06/08/22 0800           Debridement    Date/Time: 6/8/2022 8:34 AM  Performed by: Kristen Ho MD  Authorized by: Kristen Ho MD     Consent Done?:  Yes (Verbal)  Local anesthesia used?: Yes    Local anesthetic:  Topical anesthetic    Type of Debridement:  Excisional       Length (cm):  1       Area (sq cm):  0.3       Width (cm):  0.3       Percent Debrided (%):  100       Depth (cm):  0.1       Total Area Debrided (sq cm):  0.3    Depth of debridement:  Subcutaneous tissue    Tissue debrided:  Subcutaneous    Devitalized tissue debrided:  Slough    Instruments:  Curette    Bleeding:  Minimal  Hemostasis Achieved: Yes    Method Used:  Pressure  Patient tolerance:  Patient tolerated the procedure well with no immediate complications      Plan:            1. Debridement needed and Done today.   2. Keep dressing clean and intact  3. Recommend leg elevation   4. Recommend off-loading   5. Recommend decrease salt intake   6. Monitor blood pressure   7. Increase protein   8. Continue with wound care orders and plan as noted in orders.   9. Continue to follow current medication regimen as per pcp   10. Call for any questions / concerns.         Orders Placed This Encounter   Procedures    Change dressing     Wound Dressing Orders     Dressing change frequency weekly   Remove old dressing   Cleanse or irrigate with: saline   Primary wound dressing with:  Promogram to wound bed   Secondary dressing:Cavilon and bordered gauze to leg    Compression: Double Tubigrip in G, medium gradient (52 cm calf)        Follow up in about 1 week (around 6/15/2022) for wound care.

## 2022-06-15 ENCOUNTER — HOSPITAL ENCOUNTER (OUTPATIENT)
Dept: WOUND CARE | Facility: HOSPITAL | Age: 77
Discharge: HOME | End: 2022-06-15
Attending: FAMILY MEDICINE
Payer: MEDICARE

## 2022-06-15 VITALS
TEMPERATURE: 98 F | DIASTOLIC BLOOD PRESSURE: 66 MMHG | HEART RATE: 66 BPM | SYSTOLIC BLOOD PRESSURE: 156 MMHG | RESPIRATION RATE: 20 BRPM

## 2022-06-15 DIAGNOSIS — L97.811 VENOUS STASIS ULCER OF OTHER PART OF RIGHT LOWER LEG LIMITED TO BREAKDOWN OF SKIN WITHOUT VARICOSE VEINS: Primary | ICD-10-CM

## 2022-06-15 DIAGNOSIS — I87.2 VENOUS STASIS ULCER OF OTHER PART OF RIGHT LOWER LEG LIMITED TO BREAKDOWN OF SKIN WITHOUT VARICOSE VEINS: Primary | ICD-10-CM

## 2022-06-15 PROCEDURE — 17250 CHEM CAUT OF GRANLTJ TISSUE: CPT | Performed by: FAMILY MEDICINE

## 2022-06-15 PROCEDURE — 17250 PR CHEM CAUTERY GRANULATN TISSUE: ICD-10-PCS | Mod: ,,, | Performed by: FAMILY MEDICINE

## 2022-06-15 PROCEDURE — 17250 CHEM CAUT OF GRANLTJ TISSUE: CPT | Mod: ,,, | Performed by: FAMILY MEDICINE

## 2022-06-15 PROCEDURE — 99214 PR OFFICE/OUTPT VISIT, EST, LEVL IV, 30-39 MIN: ICD-10-PCS | Mod: 25,,, | Performed by: FAMILY MEDICINE

## 2022-06-15 PROCEDURE — 99214 OFFICE O/P EST MOD 30 MIN: CPT | Mod: 25,,, | Performed by: FAMILY MEDICINE

## 2022-06-15 NOTE — PROGRESS NOTES
"[facility]                Progress Note    Subjective:       Patient ID: John E Sandifer is a 76 y.o. male.    Chief Complaint: Wound Care    Follow up wound care visit. Patient walked to clinic using walking cane to assist. No c/o pain to wound at present. Dressing intact with a small amount of brown, non-malodor drainage noted. He states that his Tubigrip, "slipped up my leg, and I can't really reach it to pull it back up". Notable +2 non-pitting edema to RLE near ankle. Right Lateral Lower Leg wound measuring smaller in (0.4 cm) length and (0.1 cm) width.       Wound care done as per order, RTC in 1 week.      This is an established patient in wound care.   Patient presents in the office today for evaluation of the chronic wound.  Updated HPI is noted below.    The periwound appears non-erythematous, macerated, edematous    The wound appears wound healing    Patient denies fever, chills    Patients reports wound pain    Lymphedema status is contributory to wound status    Pain at the site of the wound is aching    Contributing factors to current wound state include numerous comorbid conditions, Diabetes Type 2, Hypertension    Review of Systems   Constitutional: Negative for activity change, appetite change and fever.   HENT: Negative for congestion.    Respiratory: Negative for shortness of breath and wheezing.    Cardiovascular: Negative for chest pain and leg swelling.   Gastrointestinal: Negative for abdominal pain, nausea and vomiting.   Skin: Positive for wound.   Neurological: Negative for dizziness and headaches.   Psychiatric/Behavioral: The patient is not nervous/anxious.          Objective:        Physical Exam  Vitals and nursing note reviewed.   Constitutional:       Appearance: He is well-developed.   HENT:      Head: Normocephalic and atraumatic.   Eyes:      Conjunctiva/sclera: Conjunctivae normal.   Pulmonary:      Effort: Pulmonary effort is normal. "   Abdominal:      General: There is no distension.      Palpations: Abdomen is soft.   Musculoskeletal:         General: Normal range of motion.      Cervical back: Normal range of motion and neck supple.   Skin:     Comments: See wound description   Neurological:      Mental Status: He is alert and oriented to person, place, and time.   Psychiatric:         Behavior: Behavior normal.         Vitals:    06/15/22 0911   BP: (!) 156/66   Pulse: 66   Resp: 20   Temp: 97.5 °F (36.4 °C)       Assessment:           ICD-10-CM ICD-9-CM   1. Venous stasis ulcer of other part of right lower leg limited to breakdown of skin without varicose veins  I87.2 459.81    L97.811 707.15            Wound 05/04/22 0921 Abrasion(s) Right anterior;lower Leg (Active)   05/04/22 0921    Pre-existing: Yes   Primary Wound Type: Abrasion(s)   Side: Right   Orientation: anterior;lower   Location: Leg   Wound Number:    Ankle-Brachial Index:    Pulses:    Removal Indication and Assessment:    Wound Outcome:    (Retired) Wound Type:    (Retired) Wound Length (cm):    (Retired) Wound Width (cm):    (Retired) Depth (cm):    Wound Description (Comments):    Removal Indications:    Wound Image    06/15/22 0900   Wound WDL ex 06/15/22 0900   Dressing Appearance Intact;Dried drainage 06/15/22 0900   Drainage Amount Small 06/15/22 0900   Drainage Characteristics/Odor Brown;No odor 06/15/22 0900   Appearance Red;Moist 06/15/22 0900   Tissue loss description Partial thickness 06/15/22 0900   Red (%), Wound Tissue Color 100 % 06/15/22 0900   Periwound Area Dry;Intact 06/15/22 0900   Wound Edges Rolled/closed 06/15/22 0900   Wound Length (cm) 0.6 cm 06/15/22 0900   Wound Width (cm) 0.2 cm 06/15/22 0900   Wound Depth (cm) 0.1 cm 06/15/22 0900   Wound Volume (cm^3) 0.012 cm^3 06/15/22 0900   Wound Surface Area (cm^2) 0.12 cm^2 06/15/22 0900   Care Cleansed with:;Antimicrobial agent;Sterile normal saline 06/15/22 0900   Dressing Changed 06/15/22 0900    Periwound Care Skin barrier film applied 06/15/22 0900   Compression Tubular elasticized bandage 06/15/22 0900   Dressing Change Due 06/22/22 06/15/22 0900           Plan:            1. Chemical cauterization with silver nitrate stick x 1 of wound bed done in clinic for treatment of hypergranulation or to decrease biofilm burden. Patient tolerated procedure well.   2. Recommend off-loading  3. Increase protein   4. Monitor blood pressure   5. Keep dressing clean and intact  6. Continue with wound care orders and plan as noted in orders.   7. Continue to follow current medication regimen as per pcp   8. Call for any questions / concerns.           Orders Placed This Encounter   Procedures    Change dressing     Wound Dressing Orders     Dressing change frequency weekly   Remove old dressing   Cleanse or irrigate with: saline   Primary wound dressing with:  Promogran to wound bed   Secondary dressing:Cavilon and Mepilex border gauze; Mepilex foam to shin (for protection)   Compression: Double Tubigrip in E, medium gradient (39.5 cm calf)        Follow up in about 1 week (around 6/22/2022) for wound care.

## 2022-06-17 DIAGNOSIS — I10 ESSENTIAL HYPERTENSION: ICD-10-CM

## 2022-06-17 DIAGNOSIS — E78.5 HYPERLIPIDEMIA LDL GOAL <100: ICD-10-CM

## 2022-06-17 DIAGNOSIS — K21.9 GASTROESOPHAGEAL REFLUX DISEASE WITHOUT ESOPHAGITIS: ICD-10-CM

## 2022-06-17 RX ORDER — METOPROLOL TARTRATE 100 MG/1
50 TABLET ORAL 2 TIMES DAILY
Qty: 60 TABLET | Refills: 3 | Status: SHIPPED | OUTPATIENT
Start: 2022-06-17 | End: 2023-02-07 | Stop reason: SDUPTHER

## 2022-06-17 RX ORDER — PRAVASTATIN SODIUM 40 MG/1
40 TABLET ORAL DAILY
Qty: 90 TABLET | Refills: 3 | Status: SHIPPED | OUTPATIENT
Start: 2022-06-17 | End: 2023-06-09 | Stop reason: SDUPTHER

## 2022-06-17 RX ORDER — OMEPRAZOLE 20 MG/1
20 CAPSULE, DELAYED RELEASE ORAL 2 TIMES DAILY
Qty: 180 CAPSULE | Refills: 3 | Status: SHIPPED | OUTPATIENT
Start: 2022-06-17 | End: 2023-06-09 | Stop reason: SDUPTHER

## 2022-06-17 NOTE — TELEPHONE ENCOUNTER
No new care gaps identified.  [facility] Embedded Care Gaps. Reference number: [id number]. 6/17/2022   12:58:01 PM CDT

## 2022-06-17 NOTE — TELEPHONE ENCOUNTER
----- Message from Valeria Ratliff sent at 6/17/2022 11:58 AM CDT -----  Regarding: Refill  Contact: Patient  Type: Patient Call Back    Who called: Patient    What is the request in detail: Patient is requesting a refill:     metoprolol tartrate (LOPRESSOR) 100 MG tablet    omeprazole (PRILOSEC) 20 MG capsule    pravastatin (PRAVACHOL) 40 MG tablet    Can the clinic reply by [facility]? No    Would the patient rather a call back or a response via [facility]? Call    Best call back number: [phone]    Additional Information:   [id number] - [place] [address] AT [facility] [address] [place] [address]  [place] [address]  Phone: [phone] Fax: [phone]      Thanks

## 2022-06-22 ENCOUNTER — HOSPITAL ENCOUNTER (OUTPATIENT)
Dept: WOUND CARE | Facility: HOSPITAL | Age: 77
Discharge: HOME | End: 2022-06-22
Attending: FAMILY MEDICINE
Payer: MEDICARE

## 2022-06-22 VITALS — DIASTOLIC BLOOD PRESSURE: 87 MMHG | TEMPERATURE: 97 F | SYSTOLIC BLOOD PRESSURE: 160 MMHG | HEART RATE: 80 BPM

## 2022-06-22 DIAGNOSIS — I83.009 STASIS ULCER: ICD-10-CM

## 2022-06-22 DIAGNOSIS — I87.2 VENOUS STASIS DERMATITIS OF BOTH LOWER EXTREMITIES: Primary | ICD-10-CM

## 2022-06-22 DIAGNOSIS — I73.9 PVD (PERIPHERAL VASCULAR DISEASE): ICD-10-CM

## 2022-06-22 DIAGNOSIS — L97.909 STASIS ULCER: ICD-10-CM

## 2022-06-22 PROCEDURE — 99214 OFFICE O/P EST MOD 30 MIN: CPT | Mod: 25,,, | Performed by: FAMILY MEDICINE

## 2022-06-22 PROCEDURE — 99214 PR OFFICE/OUTPT VISIT, EST, LEVL IV, 30-39 MIN: ICD-10-PCS | Mod: 25,,, | Performed by: FAMILY MEDICINE

## 2022-06-22 NOTE — PROGRESS NOTES
"[facility]                Progress Note    Subjective:       Patient ID: John E Sandifer is a 77 y.o. male.    Chief Complaint: Wound Check    Follow up wound care. Patient ambulated to exam room using walking cane to assist. No c/o pain at present.  He reports having neuropathy pain last night, "it just kept me up all night long". He reports taking gabapentin as prescribed with "some relief". Dressing intact with a scant amount of dried serous, non-malodor drainage. Right Anterior Lower Leg wound measuring the same as previous, with small amounts of granulating tissue to wound bed, but rolling edge to medial side of wound bed.     He is scheduled for [facility] on July 20, soonest available. Wound care done as per order, RTC in 1 week.    This is an established patient in wound care.   Patient presents in the office today for evaluation of the chronic wound.  Updated HPI is noted below.    The periwound appears non-erythematous, no maceration noted, edematous    The wound appears stable; fat layer exposed. Epibole noted. No odor. No drainage     Patient denies fever, chills    Patients reports wound pain    Lymphedema status is contributory to wound status    Pain at the site of the wound is aching    Contributing factors to current wound state include numerous comorbid conditions, Hypertension    Review of Systems   Constitutional: Negative for activity change, appetite change and fever.   HENT: Negative for congestion.    Respiratory: Negative for shortness of breath and wheezing.    Cardiovascular: Negative for chest pain and leg swelling.   Gastrointestinal: Negative for abdominal pain, nausea and vomiting.   Skin: Positive for wound.   Neurological: Negative for dizziness and headaches.   Psychiatric/Behavioral: The patient is not nervous/anxious.          Objective:        Physical Exam  Vitals and nursing note reviewed.   Constitutional:       Appearance: He is " well-developed.   HENT:      Head: Normocephalic and atraumatic.   Eyes:      Conjunctiva/sclera: Conjunctivae normal.   Pulmonary:      Effort: Pulmonary effort is normal.   Abdominal:      General: There is no distension.      Palpations: Abdomen is soft.   Musculoskeletal:         General: Normal range of motion.      Cervical back: Normal range of motion and neck supple.   Skin:     Comments: See wound description   Neurological:      Mental Status: He is alert and oriented to person, place, and time.   Psychiatric:         Behavior: Behavior normal.           Vitals:    06/22/22 0847   BP: (!) 160/87   Pulse: 80   Temp: 97.4 °F (36.3 °C)       Assessment:           ICD-10-CM ICD-9-CM   1. Venous stasis dermatitis of both lower extremities  I87.2 454.1   2. PVD (peripheral vascular disease)  I73.9 443.9   3. Stasis ulcer  I83.009 454.0    L97.909             Wound 05/04/22 0921 Abrasion(s) Right anterior;lower Leg (Active)   05/04/22 0921    Pre-existing: Yes   Primary Wound Type: Abrasion(s)   Side: Right   Orientation: anterior;lower   Location: Leg   Wound Number:    Ankle-Brachial Index:    Pulses:    Removal Indication and Assessment:    Wound Outcome:    (Retired) Wound Type:    (Retired) Wound Length (cm):    (Retired) Wound Width (cm):    (Retired) Depth (cm):    Wound Description (Comments):    Removal Indications:    Wound Image    06/22/22 0800   Wound WDL ex 06/22/22 0800   Dressing Appearance Intact;Dried drainage 06/22/22 0800   Drainage Amount Scant 06/22/22 0800   Drainage Characteristics/Odor Serous;No odor 06/22/22 0800   Appearance Red;Moist;Granulating 06/22/22 0800   Tissue loss description Partial thickness 06/22/22 0800   Red (%), Wound Tissue Color 100 % 06/22/22 0800   Periwound Area Dry;Intact 06/22/22 0800   Wound Edges Rolled/closed 06/22/22 0800   Wound Length (cm) 0.6 cm 06/22/22 0800   Wound Width (cm) 0.2 cm 06/22/22 0800   Wound Depth (cm) 0.1 cm 06/22/22 0800   Wound Volume  (cm^3) 0.012 cm^3 06/22/22 0800   Wound Surface Area (cm^2) 0.12 cm^2 06/22/22 0800   Care Cleansed with:;Antimicrobial agent;Sterile normal saline;Debrided 06/22/22 0800   Dressing Changed 06/22/22 0800   Periwound Care Skin barrier film applied 06/22/22 0800   Compression Tubular elasticized bandage 06/22/22 0800   Dressing Change Due 06/29/22 06/22/22 0800           Debridement    Date/Time: 6/22/2022 8:16 AM  Performed by: Kristen Ho MD  Authorized by: Kristen Ho MD     Consent Done?:  Yes (Verbal)  Local anesthesia used?: No      Wound Details:    Location:  Right leg    Type of Debridement:  Excisional       Length (cm):  0.6       Area (sq cm):  0.12       Width (cm):  0.2       Percent Debrided (%):  0       Depth (cm):  0.2       Total Area Debrided (sq cm):  0    Depth of debridement:  Subcutaneous tissue    Tissue debrided:  Subcutaneous    Devitalized tissue debrided:  Slough and Fibrin    Instruments:  Curette    Bleeding:  Minimal  Hemostasis Achieved: Yes    Method Used:  Pressure  Patient tolerance:  Patient tolerated the procedure well with no immediate complications      Plan:            1. Debridement needed and Done today.   2. Recommend off-loading  3. Increase protein   4. Recommend monitor blood pressure and decrease salt intake   5. Keep dressing clean and intact  6. Continue with wound care orders and plan as noted in orders.   7. Continue to follow current medication regimen as per pcp   8. Call for any questions / concerns.         Orders Placed This Encounter   Procedures    Debridement     This order was created via procedure documentation     Standing Status:   Standing     Number of Occurrences:   1    Change dressing     Wound Dressing Orders     Dressing change frequency weekly   Remove old dressing   Cleanse or irrigate with: saline   Primary wound dressing with: Endoform then Iodosorb to wound bed   Secondary dressing:Cavilon and Mepilex border gauze; Mepilex foam to shin  (for protection)   Compression: Single Layer Tubigrip in E, medium gradient (39.5 cm calf)        Follow up in about 1 week (around 6/29/2022) for wound care.

## 2022-06-29 ENCOUNTER — HOSPITAL ENCOUNTER (OUTPATIENT)
Dept: WOUND CARE | Facility: HOSPITAL | Age: 77
Discharge: HOME | End: 2022-06-29
Attending: FAMILY MEDICINE
Payer: MEDICARE

## 2022-06-29 VITALS
RESPIRATION RATE: 20 BRPM | DIASTOLIC BLOOD PRESSURE: 77 MMHG | TEMPERATURE: 97 F | SYSTOLIC BLOOD PRESSURE: 157 MMHG | HEART RATE: 70 BPM

## 2022-06-29 DIAGNOSIS — I87.2 VENOUS STASIS ULCER OF OTHER PART OF RIGHT LOWER LEG LIMITED TO BREAKDOWN OF SKIN WITHOUT VARICOSE VEINS: Primary | ICD-10-CM

## 2022-06-29 DIAGNOSIS — I73.9 PVD (PERIPHERAL VASCULAR DISEASE): ICD-10-CM

## 2022-06-29 DIAGNOSIS — L97.811 VENOUS STASIS ULCER OF OTHER PART OF RIGHT LOWER LEG LIMITED TO BREAKDOWN OF SKIN WITHOUT VARICOSE VEINS: Primary | ICD-10-CM

## 2022-06-29 PROCEDURE — 11042 DBRDMT SUBQ TIS 1ST 20SQCM/<: CPT | Performed by: FAMILY MEDICINE

## 2022-06-29 PROCEDURE — 11042 DEBRIDEMENT: ICD-10-PCS | Mod: ,,, | Performed by: FAMILY MEDICINE

## 2022-06-29 PROCEDURE — 11042 DBRDMT SUBQ TIS 1ST 20SQCM/<: CPT | Mod: ,,, | Performed by: FAMILY MEDICINE

## 2022-06-29 PROCEDURE — 99214 OFFICE O/P EST MOD 30 MIN: CPT | Mod: 25,,, | Performed by: FAMILY MEDICINE

## 2022-06-29 PROCEDURE — 99214 PR OFFICE/OUTPT VISIT, EST, LEVL IV, 30-39 MIN: ICD-10-PCS | Mod: 25,,, | Performed by: FAMILY MEDICINE

## 2022-06-29 NOTE — PROGRESS NOTES
[facility]                Progress Note    Subjective:       Patient ID: John E Sandifer is a 77 y.o. male.    Chief Complaint: Wound Check    F/u wound care visit. Patient ambulatory to exam room with wooden cane, no difficulty noted. Patient denies pain or discomfort at present. Wound dressing to right lower leg intact with small amount of drainage noted. Wound to right anterior lower leg measuring 0.2cm length and 0.1cm larger width and depth. Wound care done per order. RTC in one week.    This is an established patient in wound care.   Patient presents in the office today for evaluation of the chronic wound.  Updated HPI is noted below.    The periwound appears non-erythematous, no maceration noted, edematous    The wound appears wound healing; epibole noted. Fibrin and slough in wound bed. No odor. Serous drainage     Patient denies fever, chills    Patients reports wound pain    Lymphedema status is contributory to wound status    Pain at the site of the wound is aching and throbbing    Contributing factors to current wound state include Hypertension, Lymphedema    Review of Systems   Constitutional: Negative for activity change, appetite change and fever.   HENT: Negative for congestion.    Respiratory: Negative for shortness of breath and wheezing.    Cardiovascular: Negative for chest pain and leg swelling.   Gastrointestinal: Negative for abdominal pain, nausea and vomiting.   Skin: Positive for wound.   Neurological: Negative for dizziness and headaches.   Psychiatric/Behavioral: The patient is not nervous/anxious.          Objective:        Physical Exam  Vitals and nursing note reviewed.   Constitutional:       Appearance: He is well-developed.   HENT:      Head: Normocephalic and atraumatic.   Eyes:      Conjunctiva/sclera: Conjunctivae normal.   Pulmonary:      Effort: Pulmonary effort is normal.   Abdominal:      General: There is no distension.       Palpations: Abdomen is soft.   Musculoskeletal:         General: Normal range of motion.      Cervical back: Normal range of motion and neck supple.   Skin:     Comments: See wound description   Neurological:      Mental Status: He is alert and oriented to person, place, and time.   Psychiatric:         Behavior: Behavior normal.           Vitals:    06/29/22 0845   BP: (!) 157/77   Pulse: 70   Resp: 20   Temp: 97.2 °F (36.2 °C)       Assessment:           ICD-10-CM ICD-9-CM   1. Venous stasis ulcer of other part of right lower leg limited to breakdown of skin without varicose veins  I87.2 459.81    L97.811 707.15   2. PVD (peripheral vascular disease)  I73.9 443.9            Wound 05/04/22 0921 Abrasion(s) Right anterior;lower Leg (Active)   05/04/22 0921    Pre-existing: Yes   Primary Wound Type: Abrasion(s)   Side: Right   Orientation: anterior;lower   Location: Leg   Wound Number:    Ankle-Brachial Index:    Pulses:    Removal Indication and Assessment:    Wound Outcome:    (Retired) Wound Type:    (Retired) Wound Length (cm):    (Retired) Wound Width (cm):    (Retired) Depth (cm):    Wound Description (Comments):    Removal Indications:    Wound Image    06/29/22 0800   Wound WDL ex 06/29/22 0800   Dressing Appearance Intact;Moist drainage 06/29/22 0800   Drainage Amount Small 06/29/22 0800   Drainage Characteristics/Odor Brown 06/29/22 0800   Appearance Red 06/29/22 0800   Tissue loss description Partial thickness 06/29/22 0800   Black (%), Wound Tissue Color 0 % 06/29/22 0800   Red (%), Wound Tissue Color 100 % 06/29/22 0800   Yellow (%), Wound Tissue Color 0 % 06/29/22 0800   Periwound Area Dry;Intact 06/29/22 0800   Wound Edges Rolled/closed 06/29/22 0800   Wound Length (cm) 0.8 cm 06/29/22 0800   Wound Width (cm) 0.3 cm 06/29/22 0800   Wound Depth (cm) 0.2 cm 06/29/22 0800   Wound Volume (cm^3) 0.048 cm^3 06/29/22 0800   Wound Surface Area (cm^2) 0.24 cm^2 06/29/22 0800   Care Cleansed with:;Soap and  water;Sterile normal saline 06/29/22 0800   Dressing Changed 06/29/22 0800   Periwound Care Skin barrier film applied 06/29/22 0800   Compression Tubular elasticized bandage 06/29/22 0800   Dressing Change Due 07/06/22 06/29/22 0800           Debridement    Date/Time: 6/29/2022 8:39 AM  Performed by: Kristen Ho MD  Authorized by: Kristen Ho MD     Consent Done?:  Yes (Verbal)  Local anesthesia used?: No      Wound Details:    Location:  Right leg    Type of Debridement:  Excisional       Length (cm):  0.8       Area (sq cm):  0.24       Width (cm):  0.3       Percent Debrided (%):  100       Depth (cm):  0.2       Total Area Debrided (sq cm):  0.24    Depth of debridement:  Subcutaneous tissue    Tissue debrided:  Subcutaneous    Devitalized tissue debrided:  Slough    Instruments:  Curette    Bleeding:  Minimal  Hemostasis Achieved: Yes    Method Used:  Pressure      Plan:            1. Debridement needed and Done today.   2. Keep dressing clean and intact  3. Recommend leg elevation   4. Recommend off-loading   5. Continue with wound care orders and plan as noted in orders.   6. Continue to follow current medication regimen as per pcp   7. Call for any questions / concerns.         Orders Placed This Encounter   Procedures    Debridement     This order was created via procedure documentation     Standing Status:   Standing     Number of Occurrences:   1    Change dressing     Wound Dressing Orders     Dressing change frequency weekly   Remove old dressing   Cleanse or irrigate with: saline   Primary wound dressing with: Endoform then Iodosorb to wound bed   Secondary dressing:Cavilon and Mepilex border gauze; Mepilex foam to shin (for protection)   Compression: Single Layer Tubigrip in E, medium gradient (39cm calf)        Follow up in about 1 week (around 7/6/2022) for wound care visit.

## 2022-07-06 ENCOUNTER — HOSPITAL ENCOUNTER (OUTPATIENT)
Dept: WOUND CARE | Facility: HOSPITAL | Age: 77
Discharge: HOME | End: 2022-07-06
Attending: FAMILY MEDICINE
Payer: MEDICARE

## 2022-07-06 DIAGNOSIS — L97.909 STASIS ULCER: Primary | ICD-10-CM

## 2022-07-06 DIAGNOSIS — I83.009 STASIS ULCER: Primary | ICD-10-CM

## 2022-07-06 PROCEDURE — 99214 PR OFFICE/OUTPT VISIT, EST, LEVL IV, 30-39 MIN: ICD-10-PCS | Mod: 25,,, | Performed by: FAMILY MEDICINE

## 2022-07-06 PROCEDURE — 11042 DBRDMT SUBQ TIS 1ST 20SQCM/<: CPT | Performed by: FAMILY MEDICINE

## 2022-07-06 PROCEDURE — 11042 DBRDMT SUBQ TIS 1ST 20SQCM/<: CPT | Mod: ,,, | Performed by: FAMILY MEDICINE

## 2022-07-06 PROCEDURE — 11042 DEBRIDEMENT: ICD-10-PCS | Mod: ,,, | Performed by: FAMILY MEDICINE

## 2022-07-06 PROCEDURE — 99214 OFFICE O/P EST MOD 30 MIN: CPT | Mod: 25,,, | Performed by: FAMILY MEDICINE

## 2022-07-06 NOTE — PROGRESS NOTES
[facility]                Progress Note    Subjective:       Patient ID: John E Sandifer is a 77 y.o. male.    Chief Complaint: Wound Check    Walked to clinic with aid of cane some edema to lower legs resolved with elevation. Wound a mm smaller and light pink in color. No complaints pain    This is an established patient in wound care.   Patient presents in the office today for evaluation of the chronic wound.  Updated HPI is noted below.    The periwound appears non-erythematous, no maceration noted, edematous    The wound appears wound healing; epibole no longer present. No odor. Serous drainage.     Patient denies fever, chills    Patients reports neuropathy     Lymphedema status is contributory to wound status    Pain at the site of the wound is n/a     Contributing factors to current wound state include numerous comorbid conditions, Hypertension, Lymphedema      Review of Systems   Constitutional: Negative for activity change, appetite change and fever.   HENT: Negative for congestion.    Respiratory: Negative for shortness of breath and wheezing.    Cardiovascular: Negative for chest pain and leg swelling.   Gastrointestinal: Negative for abdominal pain, nausea and vomiting.   Skin: Positive for wound.   Neurological: Negative for dizziness and headaches.   Psychiatric/Behavioral: The patient is not nervous/anxious.          Objective:        Physical Exam  Vitals and nursing note reviewed.   Constitutional:       Appearance: He is well-developed.   HENT:      Head: Normocephalic and atraumatic.   Eyes:      Conjunctiva/sclera: Conjunctivae normal.   Pulmonary:      Effort: Pulmonary effort is normal.   Abdominal:      General: There is no distension.      Palpations: Abdomen is soft.   Musculoskeletal:         General: Normal range of motion.      Cervical back: Normal range of motion and neck supple.   Skin:     Comments: See wound description   Neurological:       Mental Status: He is alert and oriented to person, place, and time.   Psychiatric:         Behavior: Behavior normal.           There were no vitals filed for this visit.    Assessment:           ICD-10-CM ICD-9-CM   1. Stasis ulcer  I83.009 454.0    L97.909             Wound 05/04/22 0921 Abrasion(s) Right anterior;lower Leg (Active)   05/04/22 0921    Pre-existing: Yes   Primary Wound Type: Abrasion(s)   Side: Right   Orientation: anterior;lower   Location: Leg   Wound Number:    Ankle-Brachial Index:    Pulses:    Removal Indication and Assessment:    Wound Outcome:    (Retired) Wound Type:    (Retired) Wound Length (cm):    (Retired) Wound Width (cm):    (Retired) Depth (cm):    Wound Description (Comments):    Removal Indications:    Wound Image   07/06/22 1000   Wound Length (cm) 0.7 cm 07/06/22 1000   Wound Width (cm) 0.3 cm 07/06/22 1000   Wound Depth (cm) 0.3 cm 07/06/22 1000   Wound Volume (cm^3) 0.063 cm^3 07/06/22 1000   Wound Surface Area (cm^2) 0.21 cm^2 07/06/22 1000           Debridement    Date/Time: 7/6/2022 9:10 AM  Performed by: Kristen Ho MD  Authorized by: Kristen Ho MD     Consent Done?:  Yes (Verbal)  Local anesthesia used?: No      Type of Debridement:  Excisional       Length (cm):  0.7       Area (sq cm):  0.21       Width (cm):  0.3       Percent Debrided (%):  100       Depth (cm):  0.3       Total Area Debrided (sq cm):  0.21    Depth of debridement:  Subcutaneous tissue    Tissue debrided:  Subcutaneous    Devitalized tissue debrided:  Slough and Fibrin    Instruments:  Curette    Bleeding:  Minimal  Hemostasis Achieved: Yes    Method Used:  Pressure  Patient tolerance:  Patient tolerated the procedure well with no immediate complications      Plan:            1. Debridement needed and Done today.   2. Recommend leg elevation   3. Recommend off-loading  4. Increase protein   5. Keep dressing clean and intact  6. Continue with wound care orders and plan as noted in orders.    7. Continue to follow current medication regimen as per pcp   8. Call for any questions / concerns.           Orders Placed This Encounter   Procedures    Debridement     This order was created via procedure documentation     Standing Status:   Standing     Number of Occurrences:   1    Change dressing     Dressing change frequency weekly   Remove old dressing   Cleanse or irrigate with: saline   Primary wound dressing with: Endoform then Iodosorb to wound bed   Secondary dressing:Cavilon and Mepilex border gauze; Mepilex foam to shin (for protection)   Compression: Single Layer Tubigrip in E, medium gradient (39cm calf)        Follow up in about 1 week (around 7/13/2022).

## 2022-07-13 ENCOUNTER — HOSPITAL ENCOUNTER (OUTPATIENT)
Dept: WOUND CARE | Facility: HOSPITAL | Age: 77
Discharge: HOME | End: 2022-07-13
Attending: FAMILY MEDICINE
Payer: MEDICARE

## 2022-07-13 VITALS — DIASTOLIC BLOOD PRESSURE: 80 MMHG | HEART RATE: 67 BPM | TEMPERATURE: 97 F | SYSTOLIC BLOOD PRESSURE: 144 MMHG

## 2022-07-13 DIAGNOSIS — I87.2 VENOUS STASIS ULCER OF OTHER PART OF RIGHT LOWER LEG LIMITED TO BREAKDOWN OF SKIN WITHOUT VARICOSE VEINS: ICD-10-CM

## 2022-07-13 DIAGNOSIS — L97.909 STASIS ULCER: Primary | ICD-10-CM

## 2022-07-13 DIAGNOSIS — I83.009 STASIS ULCER: Primary | ICD-10-CM

## 2022-07-13 DIAGNOSIS — I73.9 PVD (PERIPHERAL VASCULAR DISEASE): ICD-10-CM

## 2022-07-13 DIAGNOSIS — L97.811 VENOUS STASIS ULCER OF OTHER PART OF RIGHT LOWER LEG LIMITED TO BREAKDOWN OF SKIN WITHOUT VARICOSE VEINS: ICD-10-CM

## 2022-07-13 PROCEDURE — 99214 OFFICE O/P EST MOD 30 MIN: CPT | Mod: 25,,, | Performed by: FAMILY MEDICINE

## 2022-07-13 PROCEDURE — 17250 CHEM CAUT OF GRANLTJ TISSUE: CPT | Performed by: FAMILY MEDICINE

## 2022-07-13 PROCEDURE — 17250 PR CHEM CAUTERY GRANULATN TISSUE: ICD-10-PCS | Mod: ,,, | Performed by: FAMILY MEDICINE

## 2022-07-13 PROCEDURE — 17250 CHEM CAUT OF GRANLTJ TISSUE: CPT | Mod: ,,, | Performed by: FAMILY MEDICINE

## 2022-07-13 PROCEDURE — 99214 PR OFFICE/OUTPT VISIT, EST, LEVL IV, 30-39 MIN: ICD-10-PCS | Mod: 25,,, | Performed by: FAMILY MEDICINE

## 2022-07-13 NOTE — PROGRESS NOTES
[facility]                Progress Note    Subjective:       Patient ID: John E Sandifer is a 77 y.o. male.    Chief Complaint: Wound Check    Follow up wound care. Patient ambulated to exam room using walking cane to assist. No c/o pain at present.  Dressing intact with a scant amount of dried yellow, non-malodor drainage. RLE has +2 non-pitting edema, patient reports elevating leg and wearing Tubigrip daily as ordered. He is scheduled for CAMMIE study next Wednesday. Right Anterior Lower Leg wound measuring smaller in (0.4 cm) length, (0.1 cm) width and (0.1 cm) depth, with new epithelization surrounding wound bed.     Wound care done as per order, RTC in 1 week.    This is an established patient in wound care.   Patient presents in the office today for evaluation of the chronic wound.  Updated HPI is noted below.    The periwound appears non-erythematous, no maceration noted, edematous    The wound appears wound healing    Patient denies fever, chills     Patients reports wound pain     Lymphedema status is contributory to wound status    Pain at the site of the wound is dull    Contributing factors to current wound state include numerous comorbid conditions, Hypertension, Lymphedema      Review of Systems   Constitutional: Negative for activity change, appetite change and fever.   HENT: Negative for congestion.    Respiratory: Negative for shortness of breath and wheezing.    Cardiovascular: Negative for chest pain and leg swelling.   Gastrointestinal: Negative for abdominal pain, nausea and vomiting.   Skin: Positive for wound.   Neurological: Negative for dizziness and headaches.   Psychiatric/Behavioral: The patient is not nervous/anxious.          Objective:        Physical Exam  Vitals and nursing note reviewed.   Constitutional:       Appearance: He is well-developed.   HENT:      Head: Normocephalic and atraumatic.   Eyes:      Conjunctiva/sclera: Conjunctivae  normal.   Pulmonary:      Effort: Pulmonary effort is normal.   Abdominal:      General: There is no distension.      Palpations: Abdomen is soft.   Musculoskeletal:         General: Normal range of motion.      Cervical back: Normal range of motion and neck supple.   Skin:     Comments: See wound description   Neurological:      Mental Status: He is alert and oriented to person, place, and time.   Psychiatric:         Behavior: Behavior normal.         Vitals:    07/13/22 0903   BP: (!) 144/80   Pulse: 67   Temp: 97.2 °F (36.2 °C)       Assessment:           ICD-10-CM ICD-9-CM   1. Stasis ulcer  I83.009 454.0    L97.909    2. Venous stasis ulcer of other part of right lower leg limited to breakdown of skin without varicose veins  I87.2 459.81    L97.811 707.15   3. PVD (peripheral vascular disease)  I73.9 443.9            Wound 05/04/22 0921 Abrasion(s) Right anterior;lower Leg (Active)   05/04/22 0921    Pre-existing: Yes   Primary Wound Type: Abrasion(s)   Side: Right   Orientation: anterior;lower   Location: Leg   Wound Number:    Ankle-Brachial Index:    Pulses:    Removal Indication and Assessment:    Wound Outcome:    (Retired) Wound Type:    (Retired) Wound Length (cm):    (Retired) Wound Width (cm):    (Retired) Depth (cm):    Wound Description (Comments):    Removal Indications:    Wound Image   07/13/22 0900   Wound WDL ex 07/13/22 0900   Dressing Appearance Intact;Dried drainage 07/13/22 0900   Drainage Amount Small 07/13/22 0900   Drainage Characteristics/Odor Yellow;No odor 07/13/22 0900   Appearance Pink;Moist;Epithelialization 07/13/22 0900   Tissue loss description Partial thickness 07/13/22 0900   Black (%), Wound Tissue Color 0 % 07/13/22 0900   Red (%), Wound Tissue Color 100 % 07/13/22 0900   Yellow (%), Wound Tissue Color 0 % 07/13/22 0900   Periwound Area Dry;Dawn 07/13/22 0900   Wound Edges Defined 07/13/22 0900   Wound Length (cm) 0.3 cm 07/13/22 0900   Wound Width (cm) 0.2 cm 07/13/22 0900    Wound Depth (cm) 0.2 cm 07/13/22 0900   Wound Volume (cm^3) 0.012 cm^3 07/13/22 0900   Wound Surface Area (cm^2) 0.06 cm^2 07/13/22 0900   Care Cleansed with:;Antimicrobial agent;Sterile normal saline 07/13/22 0900   Dressing Changed 07/13/22 0900   Periwound Care Skin barrier film applied 07/13/22 0900   Compression Tubular elasticized bandage 07/13/22 0900   Dressing Change Due 07/20/22 07/13/22 0900           Plan:            1. Chemical cauterization with silver nitrate stick x 1 of wound bed done in clinic for treatment of hypergranulation or to decrease biofilm burden. Patient tolerated procedure well.   2. Elevate legs  3. Off-load   4. Increase protein   5. Keep dressing clean and intact  6. Continue with wound care orders and plan as noted in orders.   7. Continue to follow current medication regimen as per pcp   8. Call for any questions / concerns.         Orders Placed This Encounter   Procedures    Change dressing     Dressing change frequency weekly   Remove old dressing   Cleanse or irrigate with: saline   Primary wound dressing with: Endoform then Iodosorb to wound bed   Secondary dressing:Cavilon and Mepilex border gauze; Mepilex foam to shin (for protection)   Compression: Single Layer Tubigrip in E, medium gradient (39cm calf)        Follow up in about 1 week (around 7/20/2022) for wound care.

## 2022-07-20 ENCOUNTER — HOSPITAL ENCOUNTER (OUTPATIENT)
Dept: WOUND CARE | Facility: HOSPITAL | Age: 77
Discharge: HOME | End: 2022-07-20
Attending: FAMILY MEDICINE
Payer: MEDICARE

## 2022-07-20 ENCOUNTER — HOSPITAL ENCOUNTER (OUTPATIENT)
Dept: CARDIOLOGY | Facility: HOSPITAL | Age: 77
Discharge: HOME | End: 2022-07-20
Attending: FAMILY MEDICINE
Payer: MEDICARE

## 2022-07-20 VITALS — DIASTOLIC BLOOD PRESSURE: 77 MMHG | SYSTOLIC BLOOD PRESSURE: 170 MMHG | HEART RATE: 64 BPM | TEMPERATURE: 98 F

## 2022-07-20 DIAGNOSIS — I83.009 STASIS ULCER: Primary | ICD-10-CM

## 2022-07-20 DIAGNOSIS — I87.2 VENOUS STASIS ULCER OF OTHER PART OF RIGHT LOWER LEG WITH FAT LAYER EXPOSED WITHOUT VARICOSE VEINS: ICD-10-CM

## 2022-07-20 DIAGNOSIS — I87.2 VENOUS STASIS ULCER OF OTHER PART OF RIGHT LOWER LEG LIMITED TO BREAKDOWN OF SKIN WITHOUT VARICOSE VEINS: ICD-10-CM

## 2022-07-20 DIAGNOSIS — I73.9 PVD (PERIPHERAL VASCULAR DISEASE): ICD-10-CM

## 2022-07-20 DIAGNOSIS — L97.909 STASIS ULCER: Primary | ICD-10-CM

## 2022-07-20 DIAGNOSIS — L97.812 VENOUS STASIS ULCER OF OTHER PART OF RIGHT LOWER LEG WITH FAT LAYER EXPOSED WITHOUT VARICOSE VEINS: ICD-10-CM

## 2022-07-20 DIAGNOSIS — L97.811 VENOUS STASIS ULCER OF OTHER PART OF RIGHT LOWER LEG LIMITED TO BREAKDOWN OF SKIN WITHOUT VARICOSE VEINS: ICD-10-CM

## 2022-07-20 LAB
LEFT ABI: 1.62
LEFT ARM BP: 157 MMHG
LEFT DORSALIS PEDIS: 255 MMHG
LEFT POSTERIOR TIBIAL: 217 MMHG
LEFT TBI: 0.73
LEFT TOE PRESSURE: 114 MMHG
RIGHT ABI: 1.15
RIGHT ARM BP: 145 MMHG
RIGHT DORSALIS PEDIS: 170 MMHG
RIGHT POSTERIOR TIBIAL: 181 MMHG
RIGHT TBI: 0.89
RIGHT TOE PRESSURE: 140 MMHG

## 2022-07-20 PROCEDURE — 93922 UPR/L XTREMITY ART 2 LEVELS: CPT

## 2022-07-20 PROCEDURE — 93922 ANKLE BRACHIAL INDICES (ABI): ICD-10-PCS | Mod: 26,,, | Performed by: INTERNAL MEDICINE

## 2022-07-20 PROCEDURE — 93922 UPR/L XTREMITY ART 2 LEVELS: CPT | Mod: 26,,, | Performed by: INTERNAL MEDICINE

## 2022-07-20 PROCEDURE — 99214 OFFICE O/P EST MOD 30 MIN: CPT | Mod: ,,, | Performed by: FAMILY MEDICINE

## 2022-07-20 PROCEDURE — 99213 OFFICE O/P EST LOW 20 MIN: CPT | Performed by: FAMILY MEDICINE

## 2022-07-20 PROCEDURE — 99214 PR OFFICE/OUTPT VISIT, EST, LEVL IV, 30-39 MIN: ICD-10-PCS | Mod: ,,, | Performed by: FAMILY MEDICINE

## 2022-07-27 ENCOUNTER — HOSPITAL ENCOUNTER (OUTPATIENT)
Dept: WOUND CARE | Facility: HOSPITAL | Age: 77
Discharge: HOME | End: 2022-07-27
Attending: FAMILY MEDICINE
Payer: MEDICARE

## 2022-07-27 DIAGNOSIS — L97.909 STASIS ULCER: Primary | ICD-10-CM

## 2022-07-27 DIAGNOSIS — I83.009 STASIS ULCER: Primary | ICD-10-CM

## 2022-07-27 DIAGNOSIS — L97.811 VENOUS STASIS ULCER OF OTHER PART OF RIGHT LOWER LEG LIMITED TO BREAKDOWN OF SKIN WITHOUT VARICOSE VEINS: ICD-10-CM

## 2022-07-27 DIAGNOSIS — I87.2 VENOUS STASIS ULCER OF OTHER PART OF RIGHT LOWER LEG LIMITED TO BREAKDOWN OF SKIN WITHOUT VARICOSE VEINS: ICD-10-CM

## 2022-07-27 PROCEDURE — 99214 PR OFFICE/OUTPT VISIT, EST, LEVL IV, 30-39 MIN: ICD-10-PCS | Mod: ,,, | Performed by: FAMILY MEDICINE

## 2022-07-27 PROCEDURE — 99214 OFFICE O/P EST MOD 30 MIN: CPT | Performed by: FAMILY MEDICINE

## 2022-07-27 PROCEDURE — 99214 OFFICE O/P EST MOD 30 MIN: CPT | Mod: ,,, | Performed by: FAMILY MEDICINE

## 2022-07-27 NOTE — PROGRESS NOTES
[facility]                Progress Note    Subjective:       Patient ID: John E Sandifer is a 77 y.o. male.    Chief Complaint: Wound Check    Walked to clinic with aid of cane with no complaints pain to wound. Drainage contained within bordered gauze and is serous. Original wound healed and proximal wound pink with no slough some excoriation to periwound and bordered gauze to cover both, wound smaller in measurements. Will return in 1 week.    This is an established patient in wound care.   Patient presents in the office today for evaluation of the chronic wound.  Updated HPI is noted below.    The periwound appears non-erythematous, no maceration noted, edematous    The wound appears wound healing; limited to skin breakdown. No fibrin / slough. No odor. No drainage.     Patient denies fever, chills    Patients reports wound pain    Lymphedema status is contributory to wound status    Pain at the site of the wound is aching    Contributing factors to current wound state include numerous comorbid conditions, Hypertension      Review of Systems   Constitutional: Negative for activity change, appetite change and fever.   HENT: Negative for congestion.    Respiratory: Negative for shortness of breath and wheezing.    Cardiovascular: Negative for chest pain and leg swelling.   Gastrointestinal: Negative for abdominal pain, nausea and vomiting.   Skin: Positive for wound.   Neurological: Negative for dizziness and headaches.   Psychiatric/Behavioral: The patient is not nervous/anxious.          Objective:        Physical Exam  Vitals and nursing note reviewed.   Constitutional:       Appearance: He is well-developed.   HENT:      Head: Normocephalic and atraumatic.   Eyes:      Conjunctiva/sclera: Conjunctivae normal.   Pulmonary:      Effort: Pulmonary effort is normal.   Abdominal:      General: There is no distension.      Palpations: Abdomen is soft.   Musculoskeletal:          General: Normal range of motion.      Cervical back: Normal range of motion and neck supple.   Skin:     Comments: See wound description   Neurological:      Mental Status: He is alert and oriented to person, place, and time.   Psychiatric:         Behavior: Behavior normal.         There were no vitals filed for this visit.    Assessment:           ICD-10-CM ICD-9-CM   1. Stasis ulcer  I83.009 454.0    L97.909             Altered Skin Integrity 07/20/22 0926 Right anterior;lower;proximal Leg Abrasion(s) Partial thickness tissue loss. Shallow open ulcer with a red or pink wound bed, without slough. Intact or Open/Ruptured Serum-filled blister. (Active)   07/20/22 0926   Altered Skin Integrity Present on Admission: yes   Side: Right   Orientation: anterior;lower;proximal   Location: Leg   Wound Number:    Is this injury device related?:    Primary Wound Type: Abrasion(s)   Description of Altered Skin Integrity: Partial thickness tissue loss. Shallow open ulcer with a red or pink wound bed, without slough. Intact or Open/Ruptured Serum-filled blister.   Removal Indication and Assessment:    Wound Outcome:    (Retired) Wound Length (cm):    (Retired) Wound Width (cm):    (Retired) Depth (cm):    Wound Description (Comments):    Removal Indications:    Wound Image   07/27/22 0800   Description of Altered Skin Integrity Partial thickness tissue loss. Shallow open ulcer with a red or pink wound bed, without slough. Intact or Open/Ruptured Serum-filled blister. 07/27/22 0800   Dressing Appearance Dried drainage 07/27/22 0800   Drainage Amount Scant 07/27/22 0800   Drainage Characteristics/Odor Serous 07/27/22 0800   Appearance Pink;Moist 07/27/22 0800   Tissue loss description Partial thickness 07/27/22 0800   Red (%), Wound Tissue Color 100 % 07/27/22 0800   Periwound Area Intact;Dry 07/27/22 0800   Wound Length (cm) 1 cm 07/27/22 0800   Wound Width (cm) 1.3 cm 07/27/22 0800   Wound Depth (cm) 0.1 cm 07/27/22 0800    Wound Volume (cm^3) 0.13 cm^3 07/27/22 0800   Wound Surface Area (cm^2) 1.3 cm^2 07/27/22 0800            Wound 05/04/22 0921 Abrasion(s) Right lower;lateral;anterior Leg (Active)   05/04/22 0921    Pre-existing: Yes   Primary Wound Type: Abrasion(s)   Side: Right   Orientation: lower;lateral;anterior   Location: Leg   Wound Number:    Ankle-Brachial Index:    Pulses:    Removal Indication and Assessment:    Wound Outcome:    (Retired) Wound Type:    (Retired) Wound Length (cm):    (Retired) Wound Width (cm):    (Retired) Depth (cm):    Wound Description (Comments):    Removal Indications:    Wound Image   07/27/22 0800   Wound WDL WDL 07/27/22 0800   Wound Length (cm) 0 cm 07/27/22 0800   Wound Width (cm) 0 cm 07/27/22 0800   Wound Depth (cm) 0 cm 07/27/22 0800   Wound Volume (cm^3) 0 cm^3 07/27/22 0800   Wound Surface Area (cm^2) 0 cm^2 07/27/22 0800           Plan:            1. Debridement not needed and not Done today.   2. Keep dressing clean and intact  3. Recommend off-loading, leg elevation  4. Decrease salt intake   5. Continue with wound care orders and plan as noted in orders.   6. Continue to follow current medication regimen as per pcp   7. Call for any questions / concerns.         Orders Placed This Encounter   Procedures    Change dressing     Dressing change frequency weekly   Remove old dressing   Cleanse or irrigate with: saline   Primary wound dressing with: Endoform to  wound bed  Secondary dressing:Cavilon and Mepilex border gauze x 2; Mepilex foam to shin (for protection)   Compression: Single Layer Tubigrip in E, medium gradient (39cm calf)        Follow up in about 1 week (around 8/3/2022).

## 2022-08-03 ENCOUNTER — HOSPITAL ENCOUNTER (OUTPATIENT)
Dept: WOUND CARE | Facility: HOSPITAL | Age: 77
Discharge: HOME | End: 2022-08-03
Attending: FAMILY MEDICINE
Payer: MEDICARE

## 2022-08-03 VITALS — SYSTOLIC BLOOD PRESSURE: 168 MMHG | TEMPERATURE: 97 F | DIASTOLIC BLOOD PRESSURE: 68 MMHG | HEART RATE: 68 BPM

## 2022-08-03 DIAGNOSIS — L97.811 VENOUS STASIS ULCER OF OTHER PART OF RIGHT LOWER LEG LIMITED TO BREAKDOWN OF SKIN WITHOUT VARICOSE VEINS: ICD-10-CM

## 2022-08-03 DIAGNOSIS — I83.009 STASIS ULCER: Primary | ICD-10-CM

## 2022-08-03 DIAGNOSIS — I87.2 VENOUS STASIS ULCER OF OTHER PART OF RIGHT LOWER LEG LIMITED TO BREAKDOWN OF SKIN WITHOUT VARICOSE VEINS: ICD-10-CM

## 2022-08-03 DIAGNOSIS — L97.909 STASIS ULCER: Primary | ICD-10-CM

## 2022-08-03 PROCEDURE — 99214 OFFICE O/P EST MOD 30 MIN: CPT | Performed by: FAMILY MEDICINE

## 2022-08-03 PROCEDURE — 99214 OFFICE O/P EST MOD 30 MIN: CPT | Mod: ,,, | Performed by: FAMILY MEDICINE

## 2022-08-03 PROCEDURE — 99214 OFFICE O/P EST MOD 30 MIN: CPT

## 2022-08-03 PROCEDURE — 99214 PR OFFICE/OUTPT VISIT, EST, LEVL IV, 30-39 MIN: ICD-10-PCS | Mod: ,,, | Performed by: FAMILY MEDICINE

## 2022-08-03 NOTE — PROGRESS NOTES
[facility]                Progress Note    Subjective:       Patient ID: John E Sandifer is a 77 y.o. male.    Chief Complaint: Wound Check    Walked to clinic with aid of cane. No complaints pain in wound. Dsg intact, not elevating as much as should and is aware could help avoid skin breakdown in future. Very thin skin covering wound site and bordered gauze and Tubigrip in medium grade applied for one more week. May remove dsg next week D/C from clinic.    The periwound appears non-erythematous, no maceration noted, edematous    The wound appears wound healing    Patient denies fever, chills    Lymphedema status is contributory to wound status    Contributing factors to current wound state include numerous comorbid conditions, Diabetes Type 2, Hypertension, off-loading limitations    Review of Systems   Constitutional: Negative for activity change, appetite change and fever.   HENT: Negative for congestion.    Respiratory: Negative for shortness of breath and wheezing.    Cardiovascular: Negative for chest pain and leg swelling.   Gastrointestinal: Negative for abdominal pain, nausea and vomiting.   Skin: Positive for wound.   Neurological: Negative for dizziness and headaches.   Psychiatric/Behavioral: The patient is not nervous/anxious.          Objective:        Physical Exam  Vitals and nursing note reviewed.   Constitutional:       Appearance: He is well-developed.   HENT:      Head: Normocephalic and atraumatic.   Eyes:      Conjunctiva/sclera: Conjunctivae normal.   Pulmonary:      Effort: Pulmonary effort is normal.   Abdominal:      General: There is no distension.      Palpations: Abdomen is soft.   Musculoskeletal:         General: Normal range of motion.      Cervical back: Normal range of motion and neck supple.   Skin:     Comments: See wound description   Neurological:      Mental Status: He is alert and oriented to person, place, and time.    Psychiatric:         Behavior: Behavior normal.         Vitals:    08/03/22 0826   BP: (!) 168/68   Pulse: 68   Temp: 97.3 °F (36.3 °C)       Assessment:           ICD-10-CM ICD-9-CM   1. Stasis ulcer  I83.009 454.0    L97.909    2. Venous stasis ulcer of other part of right lower leg limited to breakdown of skin without varicose veins  I87.2 459.81    L97.811 707.15       [REMOVED]      Altered Skin Integrity 07/20/22 0926 Right anterior;lower;proximal Leg Abrasion(s) Partial thickness tissue loss. Shallow open ulcer with a red or pink wound bed, without slough. Intact or Open/Ruptured Serum-filled blister. (Removed)   07/20/22 0926   Altered Skin Integrity Present on Admission: yes   Side: Right   Orientation: anterior;lower;proximal   Location: Leg   Wound Number:    Is this injury device related?:    Primary Wound Type: Abrasion(s)   Description of Altered Skin Integrity: Partial thickness tissue loss. Shallow open ulcer with a red or pink wound bed, without slough. Intact or Open/Ruptured Serum-filled blister.   Removal Indication and Assessment:    Wound Outcome: Healed   (Retired) Wound Length (cm):    (Retired) Wound Width (cm):    (Retired) Depth (cm):    Wound Description (Comments):    Removal Indications:    Removed 08/03/22 0835   Wound Image   08/03/22 0800   Dressing Appearance Dry;Intact 08/03/22 0800   Drainage Amount None 08/03/22 0800   Appearance Dry;Closed/resurfaced;Muscle 08/03/22 0800   Wound Length (cm) 0 cm 08/03/22 0800   Wound Width (cm) 0 cm 08/03/22 0800   Wound Depth (cm) 0 cm 08/03/22 0800   Wound Volume (cm^3) 0 cm^3 08/03/22 0800   Wound Surface Area (cm^2) 0 cm^2 08/03/22 0800   Care Cleansed with:;Antimicrobial agent;Sterile normal saline 08/03/22 0800   Dressing Changed 08/03/22 0800   Compression Tubular elasticized bandage 08/03/22 0800           Plan:            1. Debridement not needed and Not Done today.   2. Skin precautions discussed with patient  3. Recommend  off-loading   4. Wear compression stockings   5. Keep dressing clean and intact  6. Discharge from wound clinic   7. Continue to follow current medication regimen as per pcp   8. Call for any questions / concerns.           Orders Placed This Encounter   Procedures    Change dressing     Moisterize leg, Bordered gauze and Tubigrip in medium gradient size E        Follow up if symptoms worsen or fail to improve.

## 2022-08-30 ENCOUNTER — PATIENT MESSAGE (OUTPATIENT)
Dept: FAMILY MEDICINE | Facility: CLINIC | Age: 77
End: 2022-08-30
Payer: MEDICARE

## 2022-09-16 ENCOUNTER — PATIENT MESSAGE (OUTPATIENT)
Dept: FAMILY MEDICINE | Facility: CLINIC | Age: 77
End: 2022-09-16
Payer: MEDICARE

## 2022-09-16 DIAGNOSIS — I10 ESSENTIAL HYPERTENSION: ICD-10-CM

## 2022-09-16 DIAGNOSIS — M79.89 LEG SWELLING: ICD-10-CM

## 2022-09-16 RX ORDER — LOSARTAN POTASSIUM 100 MG/1
100 TABLET ORAL DAILY
Qty: 90 TABLET | Refills: 3 | Status: SHIPPED | OUTPATIENT
Start: 2022-09-16 | End: 2023-09-22 | Stop reason: SDUPTHER

## 2022-09-16 RX ORDER — FUROSEMIDE 20 MG/1
20 TABLET ORAL 3 TIMES DAILY PRN
Qty: 90 TABLET | Refills: 11 | Status: SHIPPED | OUTPATIENT
Start: 2022-09-16 | End: 2023-09-22 | Stop reason: SDUPTHER

## 2022-09-16 NOTE — TELEPHONE ENCOUNTER
Last Office Visit Info:   The patient's last visit with Kyree Yeager MD was on 11/22/2021.    The patient's last visit in current department was on 4/1/2022.        Last CBC Results:   Lab Results   Component Value Date    WBC 7.76 04/01/2022    HGB 14.3 04/01/2022    HCT 46.9 04/01/2022     04/01/2022       Last CMP Results  Lab Results   Component Value Date     04/01/2022    K 4.4 04/01/2022     04/01/2022    CO2 28 04/01/2022    BUN 12 04/01/2022    CREATININE 1.0 04/01/2022    CALCIUM 10.4 04/01/2022    ALBUMIN 3.9 04/01/2022    AST 19 04/01/2022    ALT 16 04/01/2022       Last Lipids  Lab Results   Component Value Date    CHOL 134 04/01/2022    TRIG 160 (H) 04/01/2022    HDL 42 04/01/2022    LDLCALC 60.0 (L) 04/01/2022       Last A1C  Lab Results   Component Value Date    HGBA1C 5.6 04/01/2022       Last TSH  Lab Results   Component Value Date    TSH 3.164 04/01/2022             Current Med Refills  Medication List with Changes/Refills   Current Medications    ALBUTEROL (PROVENTIL) 2.5 MG /3 ML (0.083 %) NEBULIZER SOLUTION    Take 2.5 mg by nebulization every 6 (six) hours as needed for Wheezing. Rescue       Start Date: --        End Date: --    ALLOPURINOL (ZYLOPRIM) 100 MG TABLET    TAKE 1 TABLET BY MOUTH EVERY DAY       Start Date: 3/27/2019 End Date: --    AMLODIPINE (NORVASC) 10 MG TABLET    TAKE 1 TABLET BY MOUTH EVERY DAY       Start Date: 2/6/2019  End Date: --    DICLOFENAC SODIUM (VOLTAREN) 1 % GEL    APPLY 2 GRAMS TOPICALLY TO THE AFFECTED AREA EVERY DAY       Start Date: 3/8/2022  End Date: --    FLUTICASONE-SALMETEROL DISKUS INHALER 250-50 MCG    Inhale 1 puff into the lungs 2 (two) times daily. INHALE 1 PUFF BY MOUTH INTO THE LUNGS TWICE DAILY       Start Date: 9/29/2021 End Date: --    FUROSEMIDE (LASIX) 20 MG TABLET    Take 1 tablet (20 mg total) by mouth 3 (three) times daily as needed (leg swelling).       Start Date: 11/22/2021End Date: 11/22/2022    GABAPENTIN  (NEURONTIN) 400 MG CAPSULE    TAKE 1 CAPSULE(400 MG) BY MOUTH TWICE DAILY       Start Date: 8/23/2022 End Date: --    LOSARTAN (COZAAR) 100 MG TABLET    TAKE 1 TABLET BY MOUTH EVERY DAY       Start Date: 6/14/2021 End Date: --    METOPROLOL TARTRATE (LOPRESSOR) 100 MG TABLET    Take 0.5 tablets (50 mg total) by mouth 2 (two) times daily.       Start Date: 6/17/2022 End Date: --    OMEPRAZOLE (PRILOSEC) 20 MG CAPSULE    Take 1 capsule (20 mg total) by mouth 2 (two) times a day.       Start Date: 6/17/2022 End Date: --    PRAVASTATIN (PRAVACHOL) 40 MG TABLET    Take 1 tablet (40 mg total) by mouth once daily.       Start Date: 6/17/2022 End Date: --

## 2022-09-16 NOTE — TELEPHONE ENCOUNTER
No new care gaps identified.  [facility] Embedded Care Gaps. Reference number: [id number]. 9/16/2022   9:26:28 AM GINAT

## 2022-12-20 ENCOUNTER — OFFICE VISIT (OUTPATIENT)
Dept: FAMILY MEDICINE | Facility: CLINIC | Age: 77
End: 2022-12-20
Payer: MEDICARE

## 2022-12-20 VITALS
WEIGHT: 278.88 LBS | HEART RATE: 74 BPM | RESPIRATION RATE: 16 BRPM | OXYGEN SATURATION: 96 % | SYSTOLIC BLOOD PRESSURE: 130 MMHG | BODY MASS INDEX: 42.27 KG/M2 | HEIGHT: 68 IN | DIASTOLIC BLOOD PRESSURE: 70 MMHG | TEMPERATURE: 98 F

## 2022-12-20 DIAGNOSIS — J44.89 COPD (CHRONIC OBSTRUCTIVE PULMONARY DISEASE) WITH CHRONIC BRONCHITIS: ICD-10-CM

## 2022-12-20 DIAGNOSIS — Z99.89 DEPENDENCE ON OTHER ENABLING MACHINES AND DEVICES: ICD-10-CM

## 2022-12-20 PROCEDURE — 99214 OFFICE O/P EST MOD 30 MIN: CPT | Mod: PBBFAC,PO | Performed by: NURSE PRACTITIONER

## 2022-12-20 PROCEDURE — 99999 PR PBB SHADOW E&M-EST. PATIENT-LVL IV: ICD-10-PCS | Mod: PBBFAC,,, | Performed by: NURSE PRACTITIONER

## 2022-12-20 PROCEDURE — 99214 OFFICE O/P EST MOD 30 MIN: CPT | Mod: S$PBB,,, | Performed by: NURSE PRACTITIONER

## 2022-12-20 PROCEDURE — 99214 PR OFFICE/OUTPT VISIT, EST, LEVL IV, 30-39 MIN: ICD-10-PCS | Mod: S$PBB,,, | Performed by: NURSE PRACTITIONER

## 2022-12-20 PROCEDURE — 99999 PR PBB SHADOW E&M-EST. PATIENT-LVL IV: CPT | Mod: PBBFAC,,, | Performed by: NURSE PRACTITIONER

## 2022-12-20 RX ORDER — FLUTICASONE PROPIONATE AND SALMETEROL 250; 50 UG/1; UG/1
1 POWDER RESPIRATORY (INHALATION) 2 TIMES DAILY
Qty: 180 EACH | Refills: 3 | Status: SHIPPED | OUTPATIENT
Start: 2022-12-20 | End: 2024-01-03

## 2022-12-20 NOTE — PROGRESS NOTES
"Subjective:      Patient ID: John E Sandifer is a 77 y.o. male.  Pt presents to clinic for refill of advair. States copd has been very well controlled and denies any acute complaints. Also has chronic debility and difficulty walking due to residual deformity after ankle fracture a few years ago, walks with cane and needs DMV disability form     Review of Systems   Constitutional:  Negative for activity change, appetite change, fever and unexpected weight change.   HENT:  Negative for nasal congestion, ear pain, hearing loss, postnasal drip, rhinorrhea, sneezing, sore throat, trouble swallowing and voice change.    Eyes:  Negative for pain, discharge and visual disturbance.   Respiratory:  Negative for cough, chest tightness, shortness of breath and wheezing.    Cardiovascular:  Positive for leg swelling. Negative for chest pain, palpitations and claudication.   Gastrointestinal:  Negative for abdominal pain, blood in stool, constipation, diarrhea, nausea and vomiting.   Endocrine: Negative.  Negative for polydipsia and polyuria.   Genitourinary:  Positive for urgency. Negative for difficulty urinating and hematuria.   Musculoskeletal:  Positive for arthralgias, back pain, gait problem, joint swelling, leg pain and joint deformity. Negative for myalgias, neck pain and neck stiffness.   Integumentary:  Positive for color change. Negative for rash.   Allergic/Immunologic: Negative.    Neurological:  Negative for speech difficulty and headaches.   Hematological: Negative.    Psychiatric/Behavioral: Negative.  Negative for confusion and dysphoric mood.    All other systems reviewed and are negative.      Objective:     Vitals:    12/20/22 1421   BP: 130/70   Pulse: 74   Resp: 16   Temp: 97.5 °F (36.4 °C)   TempSrc: Oral   SpO2: 96%   Weight: 126.5 kg (278 lb 14.1 oz)   Height: 5' 8" (1.727 m)     Physical Exam  Vitals and nursing note reviewed.   Constitutional:       General: He is not in acute distress.     Appearance: " Normal appearance. He is well-developed and well-groomed. He is obese. He is not ill-appearing.   HENT:      Head: Normocephalic and atraumatic.      Right Ear: External ear normal.      Left Ear: External ear normal.      Nose: Nose normal.      Mouth/Throat:      Lips: Pink.      Mouth: Mucous membranes are moist.   Eyes:      General: Lids are normal. Vision grossly intact. Gaze aligned appropriately.      Conjunctiva/sclera: Conjunctivae normal.      Pupils: Pupils are equal, round, and reactive to light.   Neck:      Trachea: Phonation normal.   Cardiovascular:      Rate and Rhythm: Normal rate and regular rhythm.      Heart sounds: Normal heart sounds.   Pulmonary:      Effort: Pulmonary effort is normal. No accessory muscle usage or respiratory distress.      Breath sounds: Normal breath sounds and air entry. No wheezing or rales.   Abdominal:      General: Abdomen is protuberant. Bowel sounds are normal. There is no distension.      Palpations: Abdomen is soft.      Tenderness: There is no abdominal tenderness.   Musculoskeletal:      Cervical back: Neck supple.      Right lower le+ Edema present.      Left lower le+ Edema present.      Comments: Genu varus   Skin:     General: Skin is warm and dry.      Findings: Erythema (bilateral lower leg without ulceration) present. No rash.   Neurological:      General: No focal deficit present.      Mental Status: He is alert and oriented to person, place, and time. Mental status is at baseline.      Sensory: Sensation is intact.      Motor: Motor function is intact.      Coordination: Coordination is intact.      Gait: Gait abnormal (antalgic).   Psychiatric:         Attention and Perception: Attention and perception normal.         Mood and Affect: Mood and affect normal.         Speech: Speech normal.         Behavior: Behavior normal. Behavior is cooperative.         Thought Content: Thought content normal.         Cognition and Memory: Cognition and  memory normal.         Judgment: Judgment normal.     Assessment and Plan:     1. COPD (chronic obstructive pulmonary disease) with chronic bronchitis  Discussed distinction between quick-relief and controlled medications.  Discussed medication dosage, use, side effects, and goals of treatment in detail.    Warning signs of respiratory distress were reviewed with the patient.   Discussed avoidance of precipitants.  Discussed technique for using MDIs and/or nebulizer.  Discussed monitoring symptoms and use of quick-relief medications and contacting us early in the course of exacerbations.  - fluticasone-salmeterol diskus inhaler 250-50 mcg; Inhale 1 puff into the lungs 2 (two) times daily. INHALE 1 PUFF BY MOUTH INTO THE LUNGS TWICE DAILY  Dispense: 180 each; Refill: 3    2. Dependence on other enabling machines and devices  DMV form completed in clinic and given to patient            NORIS Dykes, FNP-C  Family/Internal Medicine  [facility]

## 2023-02-07 DIAGNOSIS — I10 ESSENTIAL HYPERTENSION: ICD-10-CM

## 2023-02-07 NOTE — TELEPHONE ENCOUNTER
Care Due:                  Date            Visit Type   Department     Provider  --------------------------------------------------------------------------------                                [facility] FAMILY                              PRIMARY      MEDICINE/INTERN  Last Visit: 11-      CARE ([facility])   [facility]         Kyree Yeager  Next Visit: None Scheduled  None         None Found                                                            Last  Test          Frequency    Reason                     Performed    Due Date  --------------------------------------------------------------------------------    Office Visit  12 months..  furosemide, losartan,      11- 11-                             metoprolol, omeprazole,                             pravastatin..............    CMP.........  12 months..  furosemide, losartan,      04- 03-                             pravastatin..............    Lipid Panel.  12 months..  pravastatin..............  04- 03-    [facility] Embedded Care Gaps. Reference number: [id number]. 2/07/2023   8:58:16 AM CST

## 2023-02-09 RX ORDER — METOPROLOL TARTRATE 100 MG/1
50 TABLET ORAL 2 TIMES DAILY
Qty: 60 TABLET | Refills: 3 | Status: SHIPPED | OUTPATIENT
Start: 2023-02-09 | End: 2023-11-15 | Stop reason: SDUPTHER

## 2023-04-21 ENCOUNTER — PES CALL (OUTPATIENT)
Dept: ADMINISTRATIVE | Facility: CLINIC | Age: 78
End: 2023-04-21
Payer: MEDICARE

## 2023-06-07 DIAGNOSIS — E78.5 HYPERLIPIDEMIA LDL GOAL <100: ICD-10-CM

## 2023-06-07 DIAGNOSIS — K21.9 GASTROESOPHAGEAL REFLUX DISEASE WITHOUT ESOPHAGITIS: ICD-10-CM

## 2023-06-07 RX ORDER — PRAVASTATIN SODIUM 40 MG/1
TABLET ORAL
Qty: 90 TABLET | Refills: 3 | OUTPATIENT
Start: 2023-06-07

## 2023-06-07 RX ORDER — OMEPRAZOLE 20 MG/1
CAPSULE, DELAYED RELEASE ORAL
Qty: 180 CAPSULE | Refills: 3 | OUTPATIENT
Start: 2023-06-07

## 2023-06-07 NOTE — TELEPHONE ENCOUNTER
Care Due:                  Date            Visit Type   Department     Provider  --------------------------------------------------------------------------------                                [facility] FAMILY                              PRIMARY      MEDICINE/INTERN  Last Visit: 11-      CARE ([facility])   [facility]         Kyree Yeager  Next Visit: None Scheduled  None         None Found                                                            Last  Test          Frequency    Reason                     Performed    Due Date  --------------------------------------------------------------------------------    Office Visit  12 months..  furosemide, losartan,      11- 11-                             metoprolol, omeprazole,                             pravastatin..............    CMP.........  12 months..  furosemide, losartan,      04- 03-                             pravastatin..............    Lipid Panel.  12 months..  pravastatin..............  04- 03-    [facility] Embedded Care Due Messages. Reference number: [id number].   6/07/2023 6:04:41 AM CDT

## 2023-06-09 RX ORDER — OMEPRAZOLE 20 MG/1
20 CAPSULE, DELAYED RELEASE ORAL 2 TIMES DAILY
Qty: 180 CAPSULE | Refills: 0 | Status: SHIPPED | OUTPATIENT
Start: 2023-06-09 | End: 2023-09-22 | Stop reason: SDUPTHER

## 2023-06-09 RX ORDER — PRAVASTATIN SODIUM 40 MG/1
40 TABLET ORAL DAILY
Qty: 90 TABLET | Refills: 0 | Status: SHIPPED | OUTPATIENT
Start: 2023-06-09 | End: 2023-09-22 | Stop reason: SDUPTHER

## 2023-06-12 ENCOUNTER — PES CALL (OUTPATIENT)
Dept: ADMINISTRATIVE | Facility: CLINIC | Age: 78
End: 2023-06-12
Payer: MEDICARE

## 2023-07-17 DIAGNOSIS — I10 HTN, GOAL BELOW 130/80: ICD-10-CM

## 2023-07-17 RX ORDER — AMLODIPINE BESYLATE 10 MG/1
10 TABLET ORAL DAILY
Qty: 90 TABLET | Refills: 3 | Status: SHIPPED | OUTPATIENT
Start: 2023-07-17

## 2023-07-17 NOTE — TELEPHONE ENCOUNTER
No care due was identified.  Health [facility] Embedded Care Due Messages. Reference number: [id number].   7/17/2023 8:57:34 AM CDT

## 2023-07-31 ENCOUNTER — PES CALL (OUTPATIENT)
Dept: ADMINISTRATIVE | Facility: CLINIC | Age: 78
End: 2023-07-31
Payer: MEDICARE

## 2023-08-21 DIAGNOSIS — M79.2 NEURALGIA: ICD-10-CM

## 2023-08-21 RX ORDER — GABAPENTIN 400 MG/1
CAPSULE ORAL
Qty: 180 CAPSULE | Refills: 3 | OUTPATIENT
Start: 2023-08-21

## 2023-08-21 NOTE — TELEPHONE ENCOUNTER
Care Due:                  Date            Visit Type   Department     Provider  --------------------------------------------------------------------------------                                [facility] FAMILY                              PRIMARY      MEDICINE/INTERN  Last Visit: 11-      CARE ([facility])   [facility]         Kyree Yeager  Next Visit: None Scheduled  None         None Found                                                            Last  Test          Frequency    Reason                     Performed    Due Date  --------------------------------------------------------------------------------    Office Visit  12 months..  amLODIPine, furosemide,    11- 11-                             losartan, metoprolol.....    CMP.........  12 months..  furosemide, losartan.....  04- 03-    Health [facility] Embedded Care Due Messages. Reference number: [id number].   8/21/2023 3:19:10 PM CDT

## 2023-09-05 DIAGNOSIS — E78.5 HYPERLIPIDEMIA LDL GOAL <100: ICD-10-CM

## 2023-09-05 DIAGNOSIS — M79.2 NEURALGIA: ICD-10-CM

## 2023-09-05 DIAGNOSIS — K21.9 GASTROESOPHAGEAL REFLUX DISEASE WITHOUT ESOPHAGITIS: ICD-10-CM

## 2023-09-05 DIAGNOSIS — I10 ESSENTIAL HYPERTENSION: ICD-10-CM

## 2023-09-05 RX ORDER — OMEPRAZOLE 20 MG/1
20 CAPSULE, DELAYED RELEASE ORAL 2 TIMES DAILY
Qty: 180 CAPSULE | Refills: 0 | OUTPATIENT
Start: 2023-09-05

## 2023-09-05 RX ORDER — LOSARTAN POTASSIUM 100 MG/1
100 TABLET ORAL DAILY
Qty: 90 TABLET | Refills: 3 | OUTPATIENT
Start: 2023-09-05

## 2023-09-05 RX ORDER — GABAPENTIN 400 MG/1
CAPSULE ORAL
Qty: 180 CAPSULE | Refills: 3 | Status: SHIPPED | OUTPATIENT
Start: 2023-09-05

## 2023-09-05 RX ORDER — PRAVASTATIN SODIUM 40 MG/1
40 TABLET ORAL
Qty: 90 TABLET | Refills: 0 | OUTPATIENT
Start: 2023-09-05

## 2023-09-05 NOTE — TELEPHONE ENCOUNTER
----- Message from Deidre Colin sent at 9/5/2023  9:16 AM CDT -----  Regarding: [id number]  Type: RX Refill Request    Who Called: self     Have you contacted your pharmacy:yes     Refill or New Rx:refill     RX Name and Strength:gabapentin (NEURONTIN) 400 MG capsule      Preferred Pharmacy with phone number:  [id number] - [place] [address] [place] [address] [address] [facility] [address]  Phone: [phone] Fax: [phone]         Local or Mail Order:local     Ordering Provider:Toy     Would the patient rather a call back or a response via [facility]? Call back     Best Call Back Number:[phone]       Additional Information:

## 2023-09-08 DIAGNOSIS — I10 ESSENTIAL HYPERTENSION: ICD-10-CM

## 2023-09-08 RX ORDER — LOSARTAN POTASSIUM 100 MG/1
100 TABLET ORAL DAILY
Qty: 90 TABLET | Refills: 3 | OUTPATIENT
Start: 2023-09-08

## 2023-09-08 NOTE — TELEPHONE ENCOUNTER
No care due was identified.  Health [facility] Embedded Care Due Messages. Reference number: [id number].   9/08/2023 3:27:00 PM CDT

## 2023-09-20 ENCOUNTER — TELEPHONE (OUTPATIENT)
Dept: ADMINISTRATIVE | Facility: CLINIC | Age: 78
End: 2023-09-20
Payer: MEDICARE

## 2023-09-20 NOTE — TELEPHONE ENCOUNTER
Called pt, informed pt I was calling to remind pt of his in office EAWV on 9/22/23; clinic location provided to patient; pt confirmed appointment

## 2023-09-22 ENCOUNTER — OFFICE VISIT (OUTPATIENT)
Dept: FAMILY MEDICINE | Facility: CLINIC | Age: 78
End: 2023-09-22
Payer: MEDICARE

## 2023-09-22 VITALS
HEART RATE: 67 BPM | TEMPERATURE: 99 F | DIASTOLIC BLOOD PRESSURE: 66 MMHG | OXYGEN SATURATION: 95 % | SYSTOLIC BLOOD PRESSURE: 136 MMHG | BODY MASS INDEX: 45.91 KG/M2 | WEIGHT: 302.94 LBS | HEIGHT: 68 IN

## 2023-09-22 DIAGNOSIS — Z00.00 ENCOUNTER FOR PREVENTIVE HEALTH EXAMINATION: Primary | ICD-10-CM

## 2023-09-22 DIAGNOSIS — I48.11 LONGSTANDING PERSISTENT ATRIAL FIBRILLATION: ICD-10-CM

## 2023-09-22 DIAGNOSIS — K21.9 GASTROESOPHAGEAL REFLUX DISEASE WITHOUT ESOPHAGITIS: ICD-10-CM

## 2023-09-22 DIAGNOSIS — I73.9 PVD (PERIPHERAL VASCULAR DISEASE): ICD-10-CM

## 2023-09-22 DIAGNOSIS — H35.3131 EARLY DRY STAGE NONEXUDATIVE AGE-RELATED MACULAR DEGENERATION OF BOTH EYES: ICD-10-CM

## 2023-09-22 DIAGNOSIS — I70.0 ATHEROSCLEROSIS OF AORTA: ICD-10-CM

## 2023-09-22 DIAGNOSIS — N13.8 BPH WITH OBSTRUCTION/LOWER URINARY TRACT SYMPTOMS: ICD-10-CM

## 2023-09-22 DIAGNOSIS — G47.33 OBSTRUCTIVE SLEEP APNEA SYNDROME: ICD-10-CM

## 2023-09-22 DIAGNOSIS — N40.1 BPH WITH OBSTRUCTION/LOWER URINARY TRACT SYMPTOMS: ICD-10-CM

## 2023-09-22 DIAGNOSIS — E66.01 MORBID OBESITY WITH BMI OF 40.0-44.9, ADULT: ICD-10-CM

## 2023-09-22 DIAGNOSIS — I10 PRIMARY HYPERTENSION: ICD-10-CM

## 2023-09-22 DIAGNOSIS — R31.9 HEMATURIA, UNSPECIFIED TYPE: ICD-10-CM

## 2023-09-22 DIAGNOSIS — J32.9 CHRONIC SINUSITIS, UNSPECIFIED LOCATION: ICD-10-CM

## 2023-09-22 DIAGNOSIS — D69.6 THROMBOCYTOPENIA: ICD-10-CM

## 2023-09-22 DIAGNOSIS — J30.1 SEASONAL ALLERGIC RHINITIS DUE TO POLLEN: Chronic | ICD-10-CM

## 2023-09-22 DIAGNOSIS — Z87.19 HISTORY OF GI BLEED: ICD-10-CM

## 2023-09-22 DIAGNOSIS — M79.89 LEG SWELLING: ICD-10-CM

## 2023-09-22 DIAGNOSIS — R26.9 ABNORMALITY OF GAIT AND MOBILITY: ICD-10-CM

## 2023-09-22 DIAGNOSIS — G62.9 POLYNEUROPATHY: ICD-10-CM

## 2023-09-22 DIAGNOSIS — M17.10 PRIMARY OSTEOARTHRITIS OF KNEE, UNSPECIFIED LATERALITY: Chronic | ICD-10-CM

## 2023-09-22 DIAGNOSIS — J34.2 DEVIATED NASAL SEPTUM: ICD-10-CM

## 2023-09-22 DIAGNOSIS — Z74.09 OTHER REDUCED MOBILITY: ICD-10-CM

## 2023-09-22 DIAGNOSIS — E78.00 PURE HYPERCHOLESTEROLEMIA: ICD-10-CM

## 2023-09-22 DIAGNOSIS — R93.2 ABNORMAL LIVER ULTRASOUND: Chronic | ICD-10-CM

## 2023-09-22 DIAGNOSIS — M72.0 CONTRACTURE OF PALMAR FASCIA: ICD-10-CM

## 2023-09-22 DIAGNOSIS — I87.2 VENOUS STASIS DERMATITIS OF BOTH LOWER EXTREMITIES: ICD-10-CM

## 2023-09-22 DIAGNOSIS — Z99.89 DEPENDENCE ON OTHER ENABLING MACHINES AND DEVICES: ICD-10-CM

## 2023-09-22 DIAGNOSIS — Z96.1 PRESENCE OF INTRAOCULAR LENS: ICD-10-CM

## 2023-09-22 DIAGNOSIS — R33.9 URINARY RETENTION: ICD-10-CM

## 2023-09-22 DIAGNOSIS — I10 ESSENTIAL HYPERTENSION: ICD-10-CM

## 2023-09-22 DIAGNOSIS — J43.1 PANLOBULAR EMPHYSEMA: Chronic | ICD-10-CM

## 2023-09-22 DIAGNOSIS — J44.89 COPD (CHRONIC OBSTRUCTIVE PULMONARY DISEASE) WITH CHRONIC BRONCHITIS: ICD-10-CM

## 2023-09-22 DIAGNOSIS — E78.5 HYPERLIPIDEMIA LDL GOAL <100: ICD-10-CM

## 2023-09-22 DIAGNOSIS — R73.01 IFG (IMPAIRED FASTING GLUCOSE): ICD-10-CM

## 2023-09-22 DIAGNOSIS — R79.9 ABNORMAL FINDING OF BLOOD CHEMISTRY, UNSPECIFIED: ICD-10-CM

## 2023-09-22 LAB
BILIRUB UR QL STRIP: NEGATIVE
CLARITY UR: CLEAR
COLOR UR: YELLOW
GLUCOSE UR QL STRIP: NEGATIVE
HGB UR QL STRIP: NEGATIVE
KETONES UR QL STRIP: NEGATIVE
LEUKOCYTE ESTERASE UR QL STRIP: NEGATIVE
NITRITE UR QL STRIP: NEGATIVE
PH UR STRIP: 7 [PH] (ref 5–8)
PROT UR QL STRIP: ABNORMAL
SP GR UR STRIP: 1.01 (ref 1–1.03)
URN SPEC COLLECT METH UR: ABNORMAL
UROBILINOGEN UR STRIP-ACNC: NEGATIVE EU/DL

## 2023-09-22 PROCEDURE — 99215 OFFICE O/P EST HI 40 MIN: CPT | Mod: PBBFAC,PO | Performed by: NURSE PRACTITIONER

## 2023-09-22 PROCEDURE — 81003 URINALYSIS AUTO W/O SCOPE: CPT | Performed by: NURSE PRACTITIONER

## 2023-09-22 PROCEDURE — 99999 PR PBB SHADOW E&M-EST. PATIENT-LVL V: CPT | Mod: PBBFAC,,, | Performed by: NURSE PRACTITIONER

## 2023-09-22 PROCEDURE — G0439 PPPS, SUBSEQ VISIT: HCPCS | Mod: ,,, | Performed by: NURSE PRACTITIONER

## 2023-09-22 PROCEDURE — 99999 PR PBB SHADOW E&M-EST. PATIENT-LVL V: ICD-10-PCS | Mod: PBBFAC,,, | Performed by: NURSE PRACTITIONER

## 2023-09-22 PROCEDURE — G0439 PR MEDICARE ANNUAL WELLNESS SUBSEQUENT VISIT: ICD-10-PCS | Mod: ,,, | Performed by: NURSE PRACTITIONER

## 2023-09-22 PROCEDURE — 87086 URINE CULTURE/COLONY COUNT: CPT | Performed by: NURSE PRACTITIONER

## 2023-09-22 RX ORDER — LOSARTAN POTASSIUM 100 MG/1
100 TABLET ORAL DAILY
Qty: 90 TABLET | Refills: 3 | Status: SHIPPED | OUTPATIENT
Start: 2023-09-22

## 2023-09-22 RX ORDER — ALLOPURINOL 100 MG/1
100 TABLET ORAL
COMMUNITY
Start: 2023-09-05 | End: 2023-09-22

## 2023-09-22 RX ORDER — OMEPRAZOLE 20 MG/1
20 CAPSULE, DELAYED RELEASE ORAL 2 TIMES DAILY
Qty: 180 CAPSULE | Refills: 1 | Status: SHIPPED | OUTPATIENT
Start: 2023-09-22 | End: 2024-03-04

## 2023-09-22 RX ORDER — FUROSEMIDE 20 MG/1
20 TABLET ORAL 3 TIMES DAILY PRN
Qty: 90 TABLET | Refills: 11 | Status: SHIPPED | OUTPATIENT
Start: 2023-09-22 | End: 2024-09-21

## 2023-09-22 RX ORDER — PRAVASTATIN SODIUM 40 MG/1
40 TABLET ORAL DAILY
Qty: 90 TABLET | Refills: 0 | Status: SHIPPED | OUTPATIENT
Start: 2023-09-22 | End: 2023-12-04

## 2023-09-22 NOTE — PATIENT INSTRUCTIONS
Counseling and Referral of Other Preventative  (Italic type indicates deductible and co-insurance are waived)    Patient Name: John Sandifer  Today's Date: 9/22/2023    Health Maintenance         Date Due Completion Date    Hemoglobin A1c (Prediabetes) 04/01/2023 4/1/2022    TETANUS VACCINE 09/22/2024 (Originally 6/21/1963) ---    Shingles Vaccine (1 of 2) 09/22/2024 (Originally 6/21/1994) ---    COVID-19 Vaccine (1) 09/22/2024 (Originally 1945) ---    Pneumococcal Vaccines (Age 65+) (1 - PCV) 09/22/2024 (Originally 6/21/1951) ---    Lipid Panel 04/01/2027 4/1/2022          Orders Placed This Encounter   Procedures    Hemoglobin A1C       The following information is provided to all patients.  This information is to help you find resources for any of the problems found today that may be affecting your health:                Living healthy guide: www.[facility].[place].[facility]      Understanding Diabetes: www.diabetes.org      Eating healthy: www.cdc.gov/healthyweight      CDC home safety checklist: www.cdc.gov/steadi/patient.html      Agency on Aging: www.goea.[place].gov      Alcoholics anonymous (AA): www.aa.org      Physical Activity: www.farooq.nih.gov/vx4brxe      Tobacco use: www.quitwithusla.org

## 2023-09-22 NOTE — PROGRESS NOTES
"  John Sandifer presented for a  Medicare AWV and comprehensive Health Risk Assessment today. The following components were reviewed and updated:    Medical history  Family History  Social history  Allergies and Current Medications  Health Risk Assessment  Health Maintenance  Care Team       See Completed Assessments for Annual Wellness Visit within the encounter summary.       The following assessments were completed:  Living Situation  CAGE  Depression Screening  Timed Get Up and Go  Whisper Test  Cognitive Function Screening  Nutrition Screening  ADL Screening  PAQ Screening          Vitals:    09/22/23 0911 09/22/23 0929   BP: (!) 150/62 136/66   Pulse: 67    Temp: 98.5 °F (36.9 °C)    TempSrc: Oral    SpO2: 95%    Weight: (!) 137.4 kg (302 lb 14.6 oz)    Height: 5' 8" (1.727 m)      Body mass index is 46.06 kg/m².  Physical Exam  Vitals and nursing note reviewed.   Constitutional:       General: He is not in acute distress.     Appearance: Normal appearance. He is obese.   HENT:      Head: Normocephalic and atraumatic.   Eyes:      Conjunctiva/sclera: Conjunctivae normal.      Pupils: Pupils are equal, round, and reactive to light.   Cardiovascular:      Rate and Rhythm: Normal rate and regular rhythm.      Heart sounds: Normal heart sounds. No murmur heard.  Pulmonary:      Effort: Pulmonary effort is normal. No respiratory distress.      Breath sounds: Normal breath sounds.   Musculoskeletal:      Cervical back: Normal range of motion.      Right lower leg: Edema present.   Skin:     General: Skin is warm and dry.      Findings: Bruising and erythema (right lower extremity with some mild erythma venous statis dermatitis noted) present.   Neurological:      Mental Status: He is alert and oriented to person, place, and time.   Psychiatric:         Mood and Affect: Mood normal.         Behavior: Behavior normal.                 Diagnoses and health risks identified today and associated " recommendations/orders:    1. Encounter for preventive health examination  Screenings performed as noted above.  Personal preventative testing needs reviewed.  Recommend healthy, well balanced diet and daily aerobic exercise as tolerated.  - Hemoglobin A1C; Future    2. Essential hypertension  Well controlled.   The current medical regimen is effective;  continue present plan and medications.  Medication refilled.   Followed by PCP.   - losartan (COZAAR) 100 MG tablet; Take 1 tablet (100 mg total) by mouth once daily.  Dispense: 90 tablet; Refill: 3    3. Leg swelling  Unstable.   Refilled furosemide.   Follow up with cardiology and pcp.   - furosemide (LASIX) 20 MG tablet; Take 1 tablet (20 mg total) by mouth 3 (three) times daily as needed (leg swelling).  Dispense: 90 tablet; Refill: 11    4. Gastroesophageal reflux disease without esophagitis  Stable, The current medical regimen is effective;  continue present plan and medications.  - omeprazole (PRILOSEC) 20 MG capsule; Take 1 capsule (20 mg total) by mouth 2 (two) times a day.  Dispense: 180 capsule; Refill: 1    5. Hyperlipidemia LDL goal <100  Stable, The current medical regimen is effective;  continue present plan and medications.   - pravastatin (PRAVACHOL) 40 MG tablet; Take 1 tablet (40 mg total) by mouth once daily.  Dispense: 90 tablet; Refill: 0    6. Abnormality of gait and mobility  Use assistive device.    7. Other reduced mobility  Use assistive device.     8. Abnormal finding of blood chemistry, unspecified  - Hemoglobin A1C; Future    9. Morbid obesity with BMI of 40.0-44.9, adult  Recommend diet and exercise.   The current medical regimen is effective;  continue present plan and medications.    10. Thrombocytopenia  Stable, The current medical regimen is effective;  continue present plan and medications.    11. Longstanding persistent atrial fibrillation  Stable, The current medical regimen is effective;  continue present plan and  medications.  Followed by cardiology.     12. Atherosclerosis of aorta  Stable, The current medical regimen is effective;  continue present plan and medications.  Followed by cardiology.   On statin therapy.     13. COPD (chronic obstructive pulmonary disease) with chronic bronchitis  Stable, The current medical regimen is effective;  continue present plan and medications.    14. Polyneuropathy  Stable, The current medical regimen is effective;  continue present plan and medications.  Followed by PCP.     15. Early dry stage nonexudative age-related macular degeneration of both eyes  Stable, The current medical regimen is effective;  continue present plan and medications.  Followed by Ophthalmology.     16. Presence of intraocular lens  Stable, The current medical regimen is effective;  continue present plan and medications.  Followed by Ophthalmology.     17. Seasonal allergic rhinitis due to pollen  Stable, The current medical regimen is effective;  continue present plan and medications.    18. Chronic sinusitis, unspecified location  Stable, The current medical regimen is effective;  continue present plan and medications.    19. Deviated nasal septum  Stable, The current medical regimen is effective;  continue present plan and medications.    20. Panlobular emphysema  Stable, The current medical regimen is effective;  continue present plan and medications.  Followed by PCP.    21. Primary hypertension  Stable, The current medical regimen is effective;  continue present plan and medications.  Followed by PCP.    22. Pure hypercholesterolemia  Stable, The current medical regimen is effective;  continue present plan and medications.  Followed by PCP.    23. Venous stasis dermatitis of both lower extremities  Stable, The current medical regimen is effective;  continue present plan and medications.  Followed by PCP and Cardiology.    24. PVD (peripheral vascular disease)  Stable, The current medical regimen is  effective;  continue present plan and medications.  Followed by PCP.    25. Urinary retention  Stable, The current medical regimen is effective;  continue present plan and medications.  Followed by PCP.    26. BPH with obstruction/lower urinary tract symptoms  Stable, The current medical regimen is effective;  continue present plan and medications.  Followed by PCP.  - Urinalysis    27. IFG (impaired fasting glucose)  Stable, The current medical regimen is effective;  continue present plan and medications.  Followed by PCP.    28. Abnormal liver ultrasound  Stable, The current medical regimen is effective;  continue present plan and medications.  Followed by PCP.    29. History of GI bleed  Stable, The current medical regimen is effective;  continue present plan and medications.  Followed by PCP.    30. Primary osteoarthritis of knee, unspecified laterality  Stable, The current medical regimen is effective;  continue present plan and medications.  Followed by PCP.    31. Contracture of palmar fascia  Stable, The current medical regimen is effective;  continue present plan and medications.  Followed by PCP.    32. Obstructive sleep apnea syndrome  Stable, The current medical regimen is effective;  continue present plan and medications.  Followed by PCP.    33. Dependence on other enabling machines and devices  Stable, The current medical regimen is effective;  continue present plan and medications.  Followed by PCP.    34. Hematuria, unspecified type  Stable, The current medical regimen is effective;  continue present plan and medications.  Followed by PCP.  - Urinalysis  - CULTURE, URINE      Provided Per with a 5-10 year written screening schedule and personal prevention plan. Recommendations were developed using the USPSTF age appropriate recommendations. Education, counseling, and referrals were provided as needed. After Visit Summary printed and given to patient which includes a list of additional  screenings\tests needed.    Review for Opioid Screening: Pt does not have Rx for Opioids.  Review for Substance Use Disorders: Patient does not use substance.     I offered to discuss advanced care planning, including how to pick a person who would make decisions for you if you were unable to make them for yourself, called a health care power of , and what kind of decisions you might make such as use of life sustaining treatments such as ventilators and tube feeding when faced with a life limiting illness recorded on a living will that they will need to know. (How you want to be cared for as you near the end of your natural life)     X  Patient has advanced directives on file, which we reviewed, and they do not wish to make changes.    Follow up in about 1 year (around 9/22/2024) for for health prevention/screening.    Mariaa Joyner, DNP

## 2023-09-24 LAB — BACTERIA UR CULT: NO GROWTH

## 2023-11-15 DIAGNOSIS — I10 ESSENTIAL HYPERTENSION: ICD-10-CM

## 2023-11-15 RX ORDER — METOPROLOL TARTRATE 100 MG/1
50 TABLET ORAL 2 TIMES DAILY
Qty: 60 TABLET | Refills: 3 | Status: SHIPPED | OUTPATIENT
Start: 2023-11-15

## 2023-11-15 NOTE — TELEPHONE ENCOUNTER
Care Due:                  Date            Visit Type   Department     Provider  --------------------------------------------------------------------------------    Last Visit: None Found      None         None Found                              [facility] FAMILY                              PRIMARY      MEDICINE/INTERN  Next Visit: 12-      CARE ([facility])   [facility]         Kyree Yeager                                                            Last  Test          Frequency    Reason                     Performed    Due Date  --------------------------------------------------------------------------------    CBC.........  12 months..  diclofenac...............  04- 03-    Cr..........  12 months..  diclofenac...............  04- 03-    [facility] [employer] Embedded Care Due Messages. Reference number: [id number].   11/15/2023 11:30:57 AM CST

## 2023-12-04 DIAGNOSIS — E78.5 HYPERLIPIDEMIA LDL GOAL <100: ICD-10-CM

## 2023-12-04 RX ORDER — PRAVASTATIN SODIUM 40 MG/1
40 TABLET ORAL
Qty: 90 TABLET | Refills: 0 | Status: SHIPPED | OUTPATIENT
Start: 2023-12-04 | End: 2023-12-15 | Stop reason: SDUPTHER

## 2023-12-15 ENCOUNTER — LAB VISIT (OUTPATIENT)
Dept: LAB | Facility: HOSPITAL | Age: 78
End: 2023-12-15
Attending: FAMILY MEDICINE
Payer: MEDICARE

## 2023-12-15 ENCOUNTER — OFFICE VISIT (OUTPATIENT)
Dept: FAMILY MEDICINE | Facility: CLINIC | Age: 78
End: 2023-12-15
Payer: MEDICARE

## 2023-12-15 VITALS
WEIGHT: 298.75 LBS | HEART RATE: 69 BPM | OXYGEN SATURATION: 96 % | TEMPERATURE: 98 F | HEIGHT: 68 IN | SYSTOLIC BLOOD PRESSURE: 138 MMHG | BODY MASS INDEX: 45.28 KG/M2 | DIASTOLIC BLOOD PRESSURE: 64 MMHG

## 2023-12-15 DIAGNOSIS — R79.9 ABNORMAL FINDING OF BLOOD CHEMISTRY: ICD-10-CM

## 2023-12-15 DIAGNOSIS — E78.5 HYPERLIPIDEMIA LDL GOAL <100: ICD-10-CM

## 2023-12-15 DIAGNOSIS — Z00.00 ANNUAL PHYSICAL EXAM: Primary | ICD-10-CM

## 2023-12-15 LAB
ALBUMIN SERPL BCP-MCNC: 4.1 G/DL (ref 3.5–5.2)
ALP SERPL-CCNC: 73 U/L (ref 55–135)
ALT SERPL W/O P-5'-P-CCNC: 18 U/L (ref 10–44)
ANION GAP SERPL CALC-SCNC: 9 MMOL/L (ref 8–16)
AST SERPL-CCNC: 21 U/L (ref 10–40)
BASOPHILS # BLD AUTO: 0.07 K/UL (ref 0–0.2)
BASOPHILS NFR BLD: 0.8 % (ref 0–1.9)
BILIRUB SERPL-MCNC: 1.3 MG/DL (ref 0.1–1)
BUN SERPL-MCNC: 13 MG/DL (ref 8–23)
CALCIUM SERPL-MCNC: 10.6 MG/DL (ref 8.7–10.5)
CHLORIDE SERPL-SCNC: 100 MMOL/L (ref 95–110)
CHOLEST SERPL-MCNC: 131 MG/DL (ref 120–199)
CHOLEST/HDLC SERPL: 3.6 {RATIO} (ref 2–5)
CO2 SERPL-SCNC: 27 MMOL/L (ref 23–29)
CREAT SERPL-MCNC: 1.3 MG/DL (ref 0.5–1.4)
DIFFERENTIAL METHOD: ABNORMAL
EOSINOPHIL # BLD AUTO: 0.5 K/UL (ref 0–0.5)
EOSINOPHIL NFR BLD: 5.5 % (ref 0–8)
ERYTHROCYTE [DISTWIDTH] IN BLOOD BY AUTOMATED COUNT: 15.3 % (ref 11.5–14.5)
EST. GFR  (NO RACE VARIABLE): 56 ML/MIN/1.73 M^2
GLUCOSE SERPL-MCNC: 96 MG/DL (ref 70–110)
HCT VFR BLD AUTO: 45.9 % (ref 40–54)
HDLC SERPL-MCNC: 36 MG/DL (ref 40–75)
HDLC SERPL: 27.5 % (ref 20–50)
HGB BLD-MCNC: 14.1 G/DL (ref 14–18)
IMM GRANULOCYTES # BLD AUTO: 0.07 K/UL (ref 0–0.04)
IMM GRANULOCYTES NFR BLD AUTO: 0.8 % (ref 0–0.5)
LDLC SERPL CALC-MCNC: 78.2 MG/DL (ref 63–159)
LYMPHOCYTES # BLD AUTO: 1.2 K/UL (ref 1–4.8)
LYMPHOCYTES NFR BLD: 13.7 % (ref 18–48)
MCH RBC QN AUTO: 26.6 PG (ref 27–31)
MCHC RBC AUTO-ENTMCNC: 30.7 G/DL (ref 32–36)
MCV RBC AUTO: 87 FL (ref 82–98)
MONOCYTES # BLD AUTO: 0.8 K/UL (ref 0.3–1)
MONOCYTES NFR BLD: 9.3 % (ref 4–15)
NEUTROPHILS # BLD AUTO: 6 K/UL (ref 1.8–7.7)
NEUTROPHILS NFR BLD: 69.9 % (ref 38–73)
NONHDLC SERPL-MCNC: 95 MG/DL
NRBC BLD-RTO: 0 /100 WBC
PLATELET # BLD AUTO: 139 K/UL (ref 150–450)
PMV BLD AUTO: 12.6 FL (ref 9.2–12.9)
POTASSIUM SERPL-SCNC: 4.5 MMOL/L (ref 3.5–5.1)
PROT SERPL-MCNC: 8 G/DL (ref 6–8.4)
RBC # BLD AUTO: 5.3 M/UL (ref 4.6–6.2)
SODIUM SERPL-SCNC: 136 MMOL/L (ref 136–145)
TRIGL SERPL-MCNC: 84 MG/DL (ref 30–150)
TSH SERPL DL<=0.005 MIU/L-ACNC: 2.13 UIU/ML (ref 0.4–4)
WBC # BLD AUTO: 8.6 K/UL (ref 3.9–12.7)

## 2023-12-15 PROCEDURE — 99397 PER PM REEVAL EST PAT 65+ YR: CPT | Mod: S$PBB,GZ,, | Performed by: FAMILY MEDICINE

## 2023-12-15 PROCEDURE — 99397 PR PREVENTIVE VISIT,EST,65 & OVER: ICD-10-PCS | Mod: S$PBB,GZ,, | Performed by: FAMILY MEDICINE

## 2023-12-15 PROCEDURE — 80053 COMPREHEN METABOLIC PANEL: CPT | Performed by: FAMILY MEDICINE

## 2023-12-15 PROCEDURE — 80061 LIPID PANEL: CPT | Performed by: FAMILY MEDICINE

## 2023-12-15 PROCEDURE — 36415 COLL VENOUS BLD VENIPUNCTURE: CPT | Mod: PO | Performed by: FAMILY MEDICINE

## 2023-12-15 PROCEDURE — 84443 ASSAY THYROID STIM HORMONE: CPT | Performed by: FAMILY MEDICINE

## 2023-12-15 PROCEDURE — 99213 OFFICE O/P EST LOW 20 MIN: CPT | Mod: PBBFAC,PO | Performed by: FAMILY MEDICINE

## 2023-12-15 PROCEDURE — 99999 PR PBB SHADOW E&M-EST. PATIENT-LVL III: ICD-10-PCS | Mod: PBBFAC,,, | Performed by: FAMILY MEDICINE

## 2023-12-15 PROCEDURE — 83036 HEMOGLOBIN GLYCOSYLATED A1C: CPT | Performed by: FAMILY MEDICINE

## 2023-12-15 PROCEDURE — 85025 COMPLETE CBC W/AUTO DIFF WBC: CPT | Performed by: FAMILY MEDICINE

## 2023-12-15 PROCEDURE — 99999 PR PBB SHADOW E&M-EST. PATIENT-LVL III: CPT | Mod: PBBFAC,,, | Performed by: FAMILY MEDICINE

## 2023-12-15 RX ORDER — ALLOPURINOL 100 MG/1
100 TABLET ORAL
COMMUNITY
Start: 2023-12-06 | End: 2024-03-19

## 2023-12-15 RX ORDER — PRAVASTATIN SODIUM 40 MG/1
40 TABLET ORAL NIGHTLY
Qty: 90 TABLET | Refills: 3 | Status: SHIPPED | OUTPATIENT
Start: 2023-12-15

## 2023-12-15 NOTE — PROGRESS NOTES
Chief Complaint   Patient presents with    Annual Exam       SUBJECTIVE:   John E Sandifer is a 78 y.o. male presenting for his annual checkup.   Current Outpatient Medications   Medication Sig Dispense Refill    albuterol (PROVENTIL) 2.5 mg /3 mL (0.083 %) nebulizer solution Take 2.5 mg by nebulization every 6 (six) hours as needed for Wheezing. Rescue      amLODIPine (NORVASC) 10 MG tablet Take 1 tablet (10 mg total) by mouth once daily. 90 tablet 3    diclofenac sodium (VOLTAREN) 1 % Gel APPLY 2 GRAMS TOPICALLY TO THE AFFECTED AREA EVERY  g 1    fluticasone-salmeterol diskus inhaler 250-50 mcg Inhale 1 puff into the lungs 2 (two) times daily. INHALE 1 PUFF BY MOUTH INTO THE LUNGS TWICE DAILY 180 each 3    furosemide (LASIX) 20 MG tablet Take 1 tablet (20 mg total) by mouth 3 (three) times daily as needed (leg swelling). 90 tablet 11    gabapentin (NEURONTIN) 400 MG capsule TAKE 1 CAPSULE(400 MG) BY MOUTH TWICE DAILY 180 capsule 3    losartan (COZAAR) 100 MG tablet Take 1 tablet (100 mg total) by mouth once daily. 90 tablet 3    metoprolol tartrate (LOPRESSOR) 100 MG tablet Take 0.5 tablets (50 mg total) by mouth 2 (two) times daily. 60 tablet 3    omeprazole (PRILOSEC) 20 MG capsule Take 1 capsule (20 mg total) by mouth 2 (two) times a day. 180 capsule 1    allopurinoL (ZYLOPRIM) 100 MG tablet Take 100 mg by mouth.      pravastatin (PRAVACHOL) 40 MG tablet Take 1 tablet (40 mg total) by mouth every evening. 90 tablet 3     No current facility-administered medications for this visit.     Allergies: Bee sting [allergen ext-venom-honey bee], Culver clement (solidago canadensis), Kenalog [triamcinolone acetonide], and Lidocaine   Patient Active Problem List    Diagnosis Date Noted    Dependence on other enabling machines and devices 12/20/2022    History of GI bleed 12/16/2021    Early dry stage nonexudative age-related macular degeneration of both eyes 12/16/2021    Panlobular emphysema 12/16/2021     "Thrombocytopenia 12/16/2021    IFG (impaired fasting glucose) 12/16/2021    Atherosclerosis of aorta 12/16/2021    Polyneuropathy 12/16/2021    Iron deficiency anemia 12/16/2021    Presence of intraocular lens 12/16/2021    Venous stasis dermatitis of both lower extremities 12/16/2021    PVD (peripheral vascular disease) 12/16/2021    Contracture of palmar fascia 09/29/2021    Urinary retention 03/29/2019    BPH with obstruction/lower urinary tract symptoms 03/29/2019    Deviated nasal septum 10/04/2016    Allergic rhinitis 03/11/2015    Chronic sinusitis 07/21/2014    Abnormal liver ultrasound 02/27/2014    OA (osteoarthritis) of knee 10/18/2013    COPD (chronic obstructive pulmonary disease) 10/07/2013    Sleep apnea 09/20/2013    Morbid obesity with BMI of 40.0-44.9, adult 09/20/2013    A-fib 09/04/2013    HTN (hypertension) 09/04/2013    Hyperlipidemia 09/04/2013       ROS:  Feeling well. No dyspnea or chest pain on exertion. No abdominal pain, change in bowel habits, black or bloody stools. No urinary tract or prostatic symptoms. No neurological complaints.  Breathing issues with his abdomianl obesity  Pickwickian syndrome likely  Weight has come down some  OBJECTIVE:   The patient appears well, alert, oriented x 3, in no distress.   /64   Pulse 69   Temp 97.7 °F (36.5 °C) (Oral)   Ht 5' 8" (1.727 m)   Wt 135.5 kg (298 lb 11.6 oz)   SpO2 96%   BMI 45.42 kg/m²   Wt Readings from Last 5 Encounters:   12/15/23 135.5 kg (298 lb 11.6 oz)   09/22/23 (!) 137.4 kg (302 lb 14.6 oz)   12/20/22 126.5 kg (278 lb 14.1 oz)   03/30/22 130.7 kg (288 lb 2.3 oz)   12/16/21 125.9 kg (277 lb 9 oz)       ENT normal.  Neck supple. No adenopathy or thyromegaly. LATOYA. Lungs are clear, good air entry, no wheezes, rhonchi or rales. S1 and S2 normal, no murmurs, regular rate and rhythm. Abdomen is soft without tenderness, guarding, mass or organomegaly.  exam: deferred.  Extremities show no edema, normal peripheral pulses. " Neurological is normal without focal findings.    ASSESSMENT:   1. Annual physical exam    2. Hyperlipidemia LDL goal <100    3. Abnormal finding of blood chemistry          PLAN:   Counseled on age appropriate medical preventative services, including age appropriate cancer screenings, over all nutritional health, need for a consistent exercise regimen and an over all push towards maintaining a vigorous and active lifestyle.  Counseled on age appropriate vaccines and discussed upcoming health care needs based on age/gender.  Spent time with patient counseling on need for a good patient/doctor relationship moving forward.  Discussed use of common OTC medications and supplements.  Discussed common dietary aids and use of caffeine and the need for good sleep hygiene and stress management.    Problem List Items Addressed This Visit    None  Visit Diagnoses       Annual physical exam    -  Primary    Hyperlipidemia LDL goal <100        Relevant Medications    pravastatin (PRAVACHOL) 40 MG tablet    Other Relevant Orders    CBC Auto Differential (Completed)    Comprehensive Metabolic Panel (Completed)    Hemoglobin A1C    Lipid Panel (Completed)    TSH (Completed)    Abnormal finding of blood chemistry        Relevant Orders    CBC Auto Differential (Completed)    Comprehensive Metabolic Panel (Completed)    Hemoglobin A1C    Lipid Panel (Completed)    TSH (Completed)

## 2023-12-16 LAB
ESTIMATED AVG GLUCOSE: 103 MG/DL (ref 68–131)
HBA1C MFR BLD: 5.2 % (ref 4–5.6)

## 2024-01-03 DIAGNOSIS — J44.89 COPD (CHRONIC OBSTRUCTIVE PULMONARY DISEASE) WITH CHRONIC BRONCHITIS: ICD-10-CM

## 2024-01-03 RX ORDER — FLUTICASONE PROPIONATE AND SALMETEROL 250; 50 UG/1; UG/1
1 POWDER RESPIRATORY (INHALATION) 2 TIMES DAILY
Qty: 180 EACH | Refills: 1 | Status: SHIPPED | OUTPATIENT
Start: 2024-01-03

## 2024-03-03 DIAGNOSIS — K21.9 GASTROESOPHAGEAL REFLUX DISEASE WITHOUT ESOPHAGITIS: ICD-10-CM

## 2024-03-04 RX ORDER — OMEPRAZOLE 20 MG/1
20 CAPSULE, DELAYED RELEASE ORAL 2 TIMES DAILY
Qty: 180 CAPSULE | Refills: 1 | Status: SHIPPED | OUTPATIENT
Start: 2024-03-04

## 2024-03-12 ENCOUNTER — TELEPHONE (OUTPATIENT)
Dept: FAMILY MEDICINE | Facility: CLINIC | Age: 79
End: 2024-03-12
Payer: MEDICARE

## 2024-03-12 NOTE — TELEPHONE ENCOUNTER
----- Message from Tee Vail sent at 3/12/2024  2:02 PM CDT -----  Regarding: shiloh  Type: Patient Callback     Who called: Shiloh     What is the request in detail: Patient needs a clearance for surgery on 04/04. Please reach out to the patient so that he can have this before his surgery because it needs to be turned in by 04/01.      Can the clinic reply by [facility]? Yes     Would the patient rather a call back or a response via [facility]? Callback     Best call back number: [phone]      Additional Information:

## 2024-03-18 ENCOUNTER — TELEPHONE (OUTPATIENT)
Dept: FAMILY MEDICINE | Facility: CLINIC | Age: 79
End: 2024-03-18
Payer: MEDICARE

## 2024-03-18 NOTE — TELEPHONE ENCOUNTER
Second attempt to contact patient no answer left message to call back and reschedule appointment or through the portal.

## 2024-03-19 ENCOUNTER — OFFICE VISIT (OUTPATIENT)
Dept: FAMILY MEDICINE | Facility: CLINIC | Age: 79
End: 2024-03-19
Payer: MEDICARE

## 2024-03-19 VITALS
HEIGHT: 68 IN | SYSTOLIC BLOOD PRESSURE: 130 MMHG | RESPIRATION RATE: 16 BRPM | OXYGEN SATURATION: 97 % | TEMPERATURE: 98 F | WEIGHT: 298.06 LBS | BODY MASS INDEX: 45.17 KG/M2 | DIASTOLIC BLOOD PRESSURE: 70 MMHG | HEART RATE: 62 BPM

## 2024-03-19 DIAGNOSIS — Z01.818 PREOP EXAMINATION: Primary | ICD-10-CM

## 2024-03-19 PROCEDURE — 99999 PR PBB SHADOW E&M-EST. PATIENT-LVL IV: CPT | Mod: PBBFAC,,, | Performed by: NURSE PRACTITIONER

## 2024-03-19 PROCEDURE — 99214 OFFICE O/P EST MOD 30 MIN: CPT | Mod: S$PBB,,, | Performed by: NURSE PRACTITIONER

## 2024-03-19 PROCEDURE — 99214 OFFICE O/P EST MOD 30 MIN: CPT | Mod: PBBFAC,PO | Performed by: NURSE PRACTITIONER

## 2024-03-22 NOTE — PROGRESS NOTES
Subjective:      Patient ID: John E Sandifer is a 78 y.o. male.  Pt returns to clinic for preoperative exam. He is scheduled to have cataract surgery on 4/8/2024 by  under monitored sedation. Has had recent cataract procedure in office without complication. Pt reports being in good state of health with no acute complaints today.     Current Outpatient Medications:     albuterol (PROVENTIL) 2.5 mg /3 mL (0.083 %) nebulizer solution, Take 2.5 mg by nebulization every 6 (six) hours as needed for Wheezing. Rescue, Disp: , Rfl:     amLODIPine (NORVASC) 10 MG tablet, Take 1 tablet (10 mg total) by mouth once daily., Disp: 90 tablet, Rfl: 3    diclofenac sodium (VOLTAREN) 1 % Gel, APPLY 2 GRAMS TOPICALLY TO THE AFFECTED AREA EVERY DAY, Disp: 100 g, Rfl: 1    fluticasone-salmeterol 250-50 mcg/dose (WIXELA INHUB) 250-50 mcg/dose diskus inhaler, INHALE 1 PUFF BY MOUTH INTO THE LUNGS TWICE DAILY, Disp: 180 each, Rfl: 1    furosemide (LASIX) 20 MG tablet, Take 1 tablet (20 mg total) by mouth 3 (three) times daily as needed (leg swelling)., Disp: 90 tablet, Rfl: 11    gabapentin (NEURONTIN) 400 MG capsule, TAKE 1 CAPSULE(400 MG) BY MOUTH TWICE DAILY, Disp: 180 capsule, Rfl: 3    losartan (COZAAR) 100 MG tablet, Take 1 tablet (100 mg total) by mouth once daily., Disp: 90 tablet, Rfl: 3    metoprolol tartrate (LOPRESSOR) 100 MG tablet, Take 0.5 tablets (50 mg total) by mouth 2 (two) times daily., Disp: 60 tablet, Rfl: 3    omeprazole (PRILOSEC) 20 MG capsule, TAKE 1 CAPSULE(20 MG) BY MOUTH TWICE DAILY, Disp: 180 capsule, Rfl: 1    pravastatin (PRAVACHOL) 40 MG tablet, Take 1 tablet (40 mg total) by mouth every evening., Disp: 90 tablet, Rfl: 3    Review of Systems   Constitutional:  Negative for activity change, appetite change, fever and unexpected weight change.   HENT:  Negative for nasal congestion, ear pain, postnasal drip, rhinorrhea, sneezing, sore throat and voice change.    Eyes:  Positive for visual  "disturbance. Negative for photophobia, pain, discharge, redness, itching and eye dryness.   Respiratory:  Positive for shortness of breath (chronic with hx of COPD/emphysema and a.fib). Negative for cough, chest tightness and wheezing.    Cardiovascular:  Positive for palpitations and leg swelling. Negative for chest pain and claudication.   Gastrointestinal:  Negative for abdominal pain, change in bowel habit, constipation, diarrhea, nausea and vomiting.   Endocrine: Negative.    Genitourinary:  Negative for difficulty urinating.   Musculoskeletal:  Positive for arthralgias, gait problem (ambulatory with walking cane), joint swelling, leg pain and joint deformity. Negative for back pain, myalgias, neck pain and neck stiffness.   Integumentary:  Positive for wound (chronic bilateral lower leg wounds that are dry and healing). Negative for rash.   Allergic/Immunologic: Negative.    Neurological:  Negative for speech difficulty and headaches.   Hematological: Negative.    Psychiatric/Behavioral: Negative.     All other systems reviewed and are negative.        Objective:     Vitals:    03/19/24 0825   BP: 130/70   Pulse: 62   Resp: 16   Temp: 97.6 °F (36.4 °C)   TempSrc: Oral   SpO2: 97%   Weight: 135.2 kg (298 lb 1 oz)   Height: 5' 8" (1.727 m)     Physical Exam  Vitals and nursing note reviewed.   Constitutional:       General: He is not in acute distress.     Appearance: Normal appearance. He is well-developed and well-groomed. He is obese. He is not ill-appearing.   HENT:      Head: Normocephalic and atraumatic.      Right Ear: External ear normal.      Left Ear: External ear normal.      Nose: Nose normal.      Mouth/Throat:      Lips: Pink.      Mouth: Mucous membranes are moist.   Eyes:      General: Lids are normal. Vision grossly intact. Gaze aligned appropriately.      Conjunctiva/sclera: Conjunctivae normal.      Pupils: Pupils are equal, round, and reactive to light.   Neck:      Trachea: Phonation normal. "   Cardiovascular:      Rate and Rhythm: Normal rate. Rhythm irregular.      Heart sounds: Normal heart sounds.   Pulmonary:      Effort: Pulmonary effort is normal. No accessory muscle usage or respiratory distress.      Breath sounds: Normal breath sounds and air entry. No wheezing or rales.   Abdominal:      General: Abdomen is protuberant. Bowel sounds are normal. There is no distension.      Palpations: Abdomen is soft.      Tenderness: There is no abdominal tenderness.   Musculoskeletal:      Cervical back: Neck supple.      Right lower le+ Edema present.      Left lower le+ Edema present.      Comments: Genu varus   Skin:     General: Skin is warm and dry.      Findings: Erythema (bilateral lower leg with healing uncomplicated ulceration) present. No rash.          Neurological:      General: No focal deficit present.      Mental Status: He is alert and oriented to person, place, and time. Mental status is at baseline.      Sensory: Sensation is intact.      Motor: Motor function is intact.      Coordination: Coordination is intact.      Gait: Gait abnormal (antalgic).   Psychiatric:         Attention and Perception: Attention and perception normal.         Mood and Affect: Mood and affect normal.         Speech: Speech normal.         Behavior: Behavior normal. Behavior is cooperative.         Thought Content: Thought content normal.         Cognition and Memory: Cognition and memory normal.         Judgment: Judgment normal.       Assessment and Plan:     1. Preop examination  Preoperative workup as follows none.  Change in medication regimen before surgery: none, continue medication regimen including morning of surgery, with sip of water.  Prophylaxis for cardiac events with perioperative beta-blockers: should be considered, specific regimen per anesthesia.  Invasive hemodynamic monitoring perioperatively: at the discretion of anesthesiologist.  Deep vein thrombosis prophylaxis  postoperatively:regimen to be chosen by surgical team.  Surveillance for postoperative MI with ECG immediately postoperatively and on postoperative days 1 and 2 AND troponin levels 24 hours postoperatively and on day 4 or hospital discharge (whichever comes first): at the discretion of anesthesiologist.      The 10-year ASCVD risk score (Awa COHN, et al., 2019) is: 36%    Values used to calculate the score:      Age: 78 years      Sex: Male      Is Non- : No      Diabetic: No      Tobacco smoker: No      Systolic Blood Pressure: 130 mmHg      Is BP treated: Yes      HDL Cholesterol: 36 mg/dL      Total Cholesterol: 131 mg/dL     RCRI risk factors include: history of ischemic disease, history of heart failure, and history of cerebrovascular disease. As such, per RCRI the risk of cardiac death, nonfatal myocardial infarction, or nonfatal cardiac arrest is HIGH and the risk of myocardial infarction, pulmonary edema, ventricular fibrillation, primary cardiac arrest, or complete heart block is HIGH.  Overall this patient can be considered intermediate risk for this low risk procedure. No further cardiac testing is recommended at this time.     Patient denies any symptoms (as per HPI) concerning for undiagnosed lung disease including NELLY. Would not recommend obtaining chest X-ray, sleep study, or PFTs at this time. Patient quit smoking many years ago. We discussed the benefits of early mobilization and deep breathing after surgery.      Screened patient for alcohol misuse, use of illicit drugs, and personal or family history of anesthetic complications or bleeding diathesis and no substantial concerns were identified.     All current medications were reviewed and at this time no changes to medications are recommended prior to surgery.     I recommend use of standard pre-op and post-op precautions for this patient. In my opinion, he is medically optimized for this procedure, and can proceed without  further evaluation. Forms completed in clinic, faxed to office and original given to patient    NORIS Dykes, FNP-C  Family/Internal Medicine  [facility]

## 2024-06-20 DIAGNOSIS — I10 HTN, GOAL BELOW 130/80: ICD-10-CM

## 2024-06-20 RX ORDER — AMLODIPINE BESYLATE 10 MG/1
10 TABLET ORAL
Qty: 90 TABLET | Refills: 1 | Status: SHIPPED | OUTPATIENT
Start: 2024-06-20

## 2024-06-20 NOTE — TELEPHONE ENCOUNTER
No care due was identified.  [facility] Embedded Care Due Messages. Reference number: [id number].   6/20/2024 8:29:35 AM CDT

## 2024-06-20 NOTE — TELEPHONE ENCOUNTER
Refill Decision Note   John Sandifer  is requesting a refill authorization.  Brief Assessment and Rationale for Refill:  Approve     Medication Therapy Plan:         Comments:     Note composed:12:15 PM 06/20/2024

## 2024-06-28 DIAGNOSIS — J44.89 COPD (CHRONIC OBSTRUCTIVE PULMONARY DISEASE) WITH CHRONIC BRONCHITIS: ICD-10-CM

## 2024-06-28 RX ORDER — FLUTICASONE PROPIONATE AND SALMETEROL 250; 50 UG/1; UG/1
1 POWDER RESPIRATORY (INHALATION) 2 TIMES DAILY
Qty: 180 EACH | Refills: 1 | Status: SHIPPED | OUTPATIENT
Start: 2024-06-28

## 2024-06-28 NOTE — TELEPHONE ENCOUNTER
Last Office Visit Info:   The patient's last visit with Kyree Yeager MD was on 12/15/2023.    The patient's last visit in current department was on 3/19/2024.        Last CBC Results:   Lab Results   Component Value Date    WBC 8.60 12/15/2023    HGB 14.1 12/15/2023    HCT 45.9 12/15/2023     (L) 12/15/2023       Last CMP Results  Lab Results   Component Value Date     12/15/2023    K 4.5 12/15/2023     12/15/2023    CO2 27 12/15/2023    BUN 13 12/15/2023    CREATININE 1.3 12/15/2023    CALCIUM 10.6 (H) 12/15/2023    ALBUMIN 4.1 12/15/2023    AST 21 12/15/2023    ALT 18 12/15/2023       Last Lipids  Lab Results   Component Value Date    CHOL 131 12/15/2023    TRIG 84 12/15/2023    HDL 36 (L) 12/15/2023    LDLCALC 78.2 12/15/2023       Last A1C  Lab Results   Component Value Date    HGBA1C 5.2 12/15/2023       Last TSH  Lab Results   Component Value Date    TSH 2.135 12/15/2023             Current Med Refills  Medication List with Changes/Refills   Current Medications    ALBUTEROL (PROVENTIL) 2.5 MG /3 ML (0.083 %) NEBULIZER SOLUTION    Take 2.5 mg by nebulization every 6 (six) hours as needed for Wheezing. Rescue       Start Date: --        End Date: --    AMLODIPINE (NORVASC) 10 MG TABLET    TAKE 1 TABLET(10 MG) BY MOUTH EVERY DAY       Start Date: 6/20/2024 End Date: --    DICLOFENAC SODIUM (VOLTAREN) 1 % GEL    APPLY 2 GRAMS TOPICALLY TO THE AFFECTED AREA EVERY DAY       Start Date: 3/8/2022  End Date: --    FLUTICASONE-SALMETEROL 250-50 MCG/DOSE (WIXELA INHUB) 250-50 MCG/DOSE DISKUS INHALER    INHALE 1 PUFF BY MOUTH INTO THE LUNGS TWICE DAILY       Start Date: 1/3/2024  End Date: --    FUROSEMIDE (LASIX) 20 MG TABLET    Take 1 tablet (20 mg total) by mouth 3 (three) times daily as needed (leg swelling).       Start Date: 9/22/2023 End Date: 9/21/2024    GABAPENTIN (NEURONTIN) 400 MG CAPSULE    TAKE 1 CAPSULE(400 MG) BY MOUTH TWICE DAILY       Start Date: 9/5/2023  End Date: --    LOSARTAN  (COZAAR) 100 MG TABLET    Take 1 tablet (100 mg total) by mouth once daily.       Start Date: 9/22/2023 End Date: --    METOPROLOL TARTRATE (LOPRESSOR) 100 MG TABLET    Take 0.5 tablets (50 mg total) by mouth 2 (two) times daily.       Start Date: 11/15/2023End Date: --    OMEPRAZOLE (PRILOSEC) 20 MG CAPSULE    TAKE 1 CAPSULE(20 MG) BY MOUTH TWICE DAILY       Start Date: 3/4/2024  End Date: --    PRAVASTATIN (PRAVACHOL) 40 MG TABLET    Take 1 tablet (40 mg total) by mouth every evening.       Start Date: 12/15/2023End Date: --

## 2024-07-10 DIAGNOSIS — I10 ESSENTIAL HYPERTENSION: ICD-10-CM

## 2024-07-10 RX ORDER — METOPROLOL TARTRATE 100 MG/1
50 TABLET ORAL 2 TIMES DAILY
Qty: 90 TABLET | Refills: 1 | Status: SHIPPED | OUTPATIENT
Start: 2024-07-10

## 2024-07-10 NOTE — TELEPHONE ENCOUNTER
No care due was identified.  [facility] Embedded Care Due Messages. Reference number: [id number].   7/10/2024 3:11:52 PM CDT

## 2024-07-10 NOTE — TELEPHONE ENCOUNTER
Refill Routing Note   Medication(s) are not appropriate for processing by [facility] for the following reason(s):        Drug-disease interaction      [facility] action(s):  Defer             Pharmacist review requested: Yes     Appointments  past 12m or future 3m with PCP    Date Provider   Last Visit   12/15/2023 Kyree Yeager MD   Next Visit   Visit date not found Kyree Yeager MD   ED visits in past 90 days: 0        Note composed:4:47 PM 07/10/2024

## 2024-07-10 NOTE — TELEPHONE ENCOUNTER
Refill Decision Note   John Sandifer  is requesting a refill authorization.  Brief Assessment and Rationale for Refill:  Approve     Medication Therapy Plan:         Pharmacist review requested: Yes   Extended chart review required: Yes   Comments:     Note composed:6:34 PM 07/10/2024

## 2024-08-25 DIAGNOSIS — M79.2 NEURALGIA: ICD-10-CM

## 2024-08-25 NOTE — TELEPHONE ENCOUNTER
No care due was identified.  [facility] Embedded Care Due Messages. Reference number: [id number].   8/25/2024 12:45:16 PM CDT

## 2024-08-29 RX ORDER — GABAPENTIN 400 MG/1
CAPSULE ORAL
Qty: 180 CAPSULE | Refills: 0 | Status: SHIPPED | OUTPATIENT
Start: 2024-08-29

## 2024-08-30 DIAGNOSIS — K21.9 GASTROESOPHAGEAL REFLUX DISEASE WITHOUT ESOPHAGITIS: ICD-10-CM

## 2024-08-30 DIAGNOSIS — I10 ESSENTIAL HYPERTENSION: ICD-10-CM

## 2024-08-30 RX ORDER — OMEPRAZOLE 20 MG/1
20 CAPSULE, DELAYED RELEASE ORAL 2 TIMES DAILY
Qty: 180 CAPSULE | Refills: 1 | Status: SHIPPED | OUTPATIENT
Start: 2024-08-30

## 2024-08-30 RX ORDER — LOSARTAN POTASSIUM 100 MG/1
100 TABLET ORAL
Qty: 90 TABLET | Refills: 3 | Status: SHIPPED | OUTPATIENT
Start: 2024-08-30

## 2024-08-30 NOTE — TELEPHONE ENCOUNTER
No care due was identified.  [facility] Embedded Care Due Messages. Reference number: [id number].   8/30/2024 6:04:22 AM CDT

## 2024-08-30 NOTE — TELEPHONE ENCOUNTER
John Sandifer  is requesting a refill authorization.  Brief Assessment and Rationale for Refill:  Approve     Medication Therapy Plan:         Comments:     Note composed:8:27 AM 08/30/2024

## 2024-11-04 DIAGNOSIS — M79.89 LEG SWELLING: ICD-10-CM

## 2024-11-04 RX ORDER — FUROSEMIDE 20 MG/1
TABLET ORAL
Qty: 90 TABLET | Refills: 11 | Status: SHIPPED | OUTPATIENT
Start: 2024-11-04

## 2024-11-29 ENCOUNTER — OFFICE VISIT (OUTPATIENT)
Dept: FAMILY MEDICINE | Facility: CLINIC | Age: 79
End: 2024-11-29
Payer: MEDICARE

## 2024-11-29 VITALS
HEIGHT: 69 IN | SYSTOLIC BLOOD PRESSURE: 134 MMHG | WEIGHT: 301.81 LBS | BODY MASS INDEX: 44.7 KG/M2 | HEART RATE: 76 BPM | OXYGEN SATURATION: 95 % | TEMPERATURE: 98 F | DIASTOLIC BLOOD PRESSURE: 76 MMHG

## 2024-11-29 DIAGNOSIS — B02.29 POSTHERPETIC NEURALGIA: Primary | ICD-10-CM

## 2024-11-29 DIAGNOSIS — D69.6 THROMBOCYTOPENIA: ICD-10-CM

## 2024-11-29 DIAGNOSIS — I48.11 LONGSTANDING PERSISTENT ATRIAL FIBRILLATION: ICD-10-CM

## 2024-11-29 DIAGNOSIS — J43.1 PANLOBULAR EMPHYSEMA: ICD-10-CM

## 2024-11-29 DIAGNOSIS — I70.0 ATHEROSCLEROSIS OF AORTA: ICD-10-CM

## 2024-11-29 DIAGNOSIS — R79.9 ABNORMAL FINDING OF BLOOD CHEMISTRY: ICD-10-CM

## 2024-11-29 DIAGNOSIS — E66.01 MORBID OBESITY WITH BMI OF 40.0-44.9, ADULT: ICD-10-CM

## 2024-11-29 PROCEDURE — 99213 OFFICE O/P EST LOW 20 MIN: CPT | Mod: PBBFAC,PO | Performed by: FAMILY MEDICINE

## 2024-11-29 PROCEDURE — 99999 PR PBB SHADOW E&M-EST. PATIENT-LVL III: CPT | Mod: PBBFAC,,, | Performed by: FAMILY MEDICINE

## 2024-11-29 RX ORDER — LIDOCAINE 50 MG/G
1 PATCH TOPICAL DAILY
Qty: 30 PATCH | Refills: 2 | Status: SHIPPED | OUTPATIENT
Start: 2024-11-29

## 2024-11-29 RX ORDER — ALLOPURINOL 100 MG/1
100 TABLET ORAL
COMMUNITY
Start: 2024-08-30 | End: 2024-11-29 | Stop reason: ALTCHOICE

## 2024-11-29 NOTE — PROGRESS NOTES
Chief Complaint   Patient presents with    Annual Exam       SUBJECTIVE:   John E Sandifer is a 79 y.o. male presenting for his annual checkup.   Current Outpatient Medications   Medication Sig Dispense Refill    amLODIPine (NORVASC) 10 MG tablet TAKE 1 TABLET(10 MG) BY MOUTH EVERY DAY 90 tablet 1    diclofenac sodium (VOLTAREN) 1 % Gel APPLY 2 GRAMS TOPICALLY TO THE AFFECTED AREA EVERY  g 1    fluticasone-salmeterol diskus inhaler 250-50 mcg INHALE 1 PUFF BY MOUTH TWICE DAILY 180 each 1    furosemide (LASIX) 20 MG tablet TAKE 1 TABLET(20 MG) BY MOUTH THREE TIMES DAILY AS NEEDED FOR LEG SWELLING 90 tablet 11    gabapentin (NEURONTIN) 400 MG capsule TAKE 1 CAPSULE(400 MG) BY MOUTH TWICE DAILY 180 capsule 0    losartan (COZAAR) 100 MG tablet TAKE 1 TABLET(100 MG) BY MOUTH EVERY DAY 90 tablet 3    metoprolol tartrate (LOPRESSOR) 100 MG tablet Take 0.5 tablets (50 mg total) by mouth 2 (two) times daily. 90 tablet 1    omeprazole (PRILOSEC) 20 MG capsule TAKE 1 CAPSULE(20 MG) BY MOUTH TWICE DAILY 180 capsule 1    pravastatin (PRAVACHOL) 40 MG tablet Take 1 tablet (40 mg total) by mouth every evening. 90 tablet 3     No current facility-administered medications for this visit.     Allergies: Bee sting [allergen ext-venom-honey bee], Culver clement (solidago canadensis), and Ragweed   Patient Active Problem List    Diagnosis Date Noted    Dependence on other enabling machines and devices 12/20/2022    History of GI bleed 12/16/2021    Early dry stage nonexudative age-related macular degeneration of both eyes 12/16/2021    Panlobular emphysema 12/16/2021    Thrombocytopenia 12/16/2021    IFG (impaired fasting glucose) 12/16/2021    Atherosclerosis of aorta 12/16/2021    Polyneuropathy 12/16/2021    Iron deficiency anemia 12/16/2021    Presence of intraocular lens 12/16/2021    Venous stasis dermatitis of both lower extremities 12/16/2021    PVD (peripheral vascular disease) 12/16/2021    Contracture of palmar fascia  "09/29/2021    Urinary retention 03/29/2019    BPH with obstruction/lower urinary tract symptoms 03/29/2019    Deviated nasal septum 10/04/2016    Allergic rhinitis 03/11/2015    Chronic sinusitis 07/21/2014    Abnormal liver ultrasound 02/27/2014    OA (osteoarthritis) of knee 10/18/2013    COPD (chronic obstructive pulmonary disease) 10/07/2013    Sleep apnea 09/20/2013    Morbid obesity with body mass index (BMI) of 40.0 to 49.9 09/20/2013    A-fib 09/04/2013    HTN (hypertension) 09/04/2013    Hyperlipidemia 09/04/2013       ROS:  Feeling well. No dyspnea or chest pain on exertion. No abdominal pain, change in bowel habits, black or bloody stools. No urinary tract or prostatic symptoms. No neurological complaints.  Knee pain and arthritis, has some neuralgia pain in the right arm especially and the legs.  Had rash  OBJECTIVE:   The patient appears well, alert, oriented x 3, in no distress.   /76   Pulse 76   Temp 97.9 °F (36.6 °C) (Oral)   Ht 5' 8.5" (1.74 m)   Wt (!) 136.9 kg (301 lb 13 oz)   SpO2 95%   BMI 45.22 kg/m²   Wt Readings from Last 5 Encounters:   11/29/24 (!) 136.9 kg (301 lb 13 oz)   03/19/24 135.2 kg (298 lb 1 oz)   12/15/23 135.5 kg (298 lb 11.6 oz)   09/22/23 (!) 137.4 kg (302 lb 14.6 oz)   12/20/22 126.5 kg (278 lb 14.1 oz)       ENT abnormal.  Neck supple. No adenopathy or thyromegaly. LATOYA. Irregularly irregular heart beat.  exam: deferred.  Extremities show no edema, normal peripheral pulses. Neurological is normal without focal findings. Legs with swelling and chronic venous changes. Chronic lung changes, severe chronic venous changes, no evidence of infection    ASSESSMENT:   1. Panlobular emphysema    2. Morbid obesity with BMI of 40.0-44.9, adult    3. Longstanding persistent atrial fibrillation    4. Thrombocytopenia    5. Atherosclerosis of aorta          PLAN:   Counseled on age appropriate medical preventative services, including age appropriate cancer screenings, over " all nutritional health, need for a consistent exercise regimen and an over all push towards maintaining a vigorous and active lifestyle.  Counseled on age appropriate vaccines and discussed upcoming health care needs based on age/gender.  Spent time with patient counseling on need for a good patient/doctor relationship moving forward.  Discussed use of common OTC medications and supplements.  Discussed common dietary aids and use of caffeine and the need for good sleep hygiene and stress management.    Problem List Items Addressed This Visit       A-fib    Overview     chronic         Current Assessment & Plan     Has atrial fibrillation  We will see about xarelto         Morbid obesity with body mass index (BMI) of 40.0 to 49.9    Current Assessment & Plan     The patient is asked to make an attempt to improve diet and exercise patterns to aid in medical management of this problem.           Panlobular emphysema    Current Assessment & Plan     Inhaler for controller  No exacerbation  Monitor  Not on oxygen         Thrombocytopenia    Current Assessment & Plan     No unusual bleeding         Atherosclerosis of aorta    Overview     CXR 9/19/16         Current Assessment & Plan     Statin and b/p monitoring          Had 3 times severe bleed so no NOAC  Had ? Shingles rash and pain in the hands and legs  Trial of lidoderm patch  Prognosis is guarded  At very high risk overall  But risk of bleed is high

## 2024-12-02 ENCOUNTER — TELEPHONE (OUTPATIENT)
Dept: FAMILY MEDICINE | Facility: CLINIC | Age: 79
End: 2024-12-02
Payer: MEDICARE

## 2024-12-02 DIAGNOSIS — M79.2 NEURALGIA: ICD-10-CM

## 2024-12-02 RX ORDER — GABAPENTIN 400 MG/1
CAPSULE ORAL
Qty: 180 CAPSULE | Refills: 0 | Status: SHIPPED | OUTPATIENT
Start: 2024-12-02

## 2024-12-02 NOTE — TELEPHONE ENCOUNTER
Attempted to contact PT to notify of normal lab results and repeat in 1 year. No answer, left voicemail.

## 2024-12-02 NOTE — TELEPHONE ENCOUNTER
----- Message from Kyree Yeager MD sent at 11/29/2024  5:38 PM CST -----  Let him know his labs are stable and improved overall!  JR  Repeat in 1 year

## 2024-12-02 NOTE — TELEPHONE ENCOUNTER
No care due was identified.  [facility] Embedded Care Due Messages. Reference number: [id number].   12/02/2024 2:05:55 PM CST

## 2024-12-22 DIAGNOSIS — I10 HTN, GOAL BELOW 130/80: ICD-10-CM

## 2024-12-22 RX ORDER — AMLODIPINE BESYLATE 10 MG/1
10 TABLET ORAL
Qty: 90 TABLET | Refills: 3 | Status: SHIPPED | OUTPATIENT
Start: 2024-12-22

## 2024-12-22 NOTE — TELEPHONE ENCOUNTER
No care due was identified.  [facility] Embedded Care Due Messages. Reference number: [id number].   12/22/2024 12:39:36 PM CST

## 2024-12-23 NOTE — TELEPHONE ENCOUNTER
Refill Decision Note   John Sandifer  is requesting a refill authorization.  Brief Assessment and Rationale for Refill:  Approve     Medication Therapy Plan:        Comments:     Note composed:8:02 PM 12/22/2024

## 2025-01-01 DIAGNOSIS — I10 ESSENTIAL HYPERTENSION: ICD-10-CM

## 2025-01-01 NOTE — TELEPHONE ENCOUNTER
No care due was identified.  [facility] Embedded Care Due Messages. Reference number: [id number].   1/01/2025 12:39:11 PM CST

## 2025-01-02 RX ORDER — METOPROLOL TARTRATE 100 MG/1
50 TABLET ORAL 2 TIMES DAILY
Qty: 90 TABLET | Refills: 3 | Status: SHIPPED | OUTPATIENT
Start: 2025-01-02

## 2025-01-02 NOTE — TELEPHONE ENCOUNTER
John Sandifer  is requesting a refill authorization.  Brief Assessment and Rationale for Refill:  Approve     Medication Therapy Plan:         Pharmacist review requested: Yes   Extended chart review required: Yes   Comments:     Note composed:12:53 PM 01/02/2025

## 2025-02-27 DIAGNOSIS — K21.9 GASTROESOPHAGEAL REFLUX DISEASE WITHOUT ESOPHAGITIS: ICD-10-CM

## 2025-02-27 DIAGNOSIS — M79.2 NEURALGIA: ICD-10-CM

## 2025-02-27 RX ORDER — OMEPRAZOLE 20 MG/1
20 CAPSULE, DELAYED RELEASE ORAL 2 TIMES DAILY
Qty: 180 CAPSULE | Refills: 3 | Status: SHIPPED | OUTPATIENT
Start: 2025-02-27

## 2025-02-27 RX ORDER — GABAPENTIN 400 MG/1
CAPSULE ORAL
Qty: 180 CAPSULE | Refills: 0 | Status: SHIPPED | OUTPATIENT
Start: 2025-02-27

## 2025-02-27 NOTE — TELEPHONE ENCOUNTER
No care due was identified.  [facility] Embedded Care Due Messages. Reference number: [id number].   2/27/2025 5:56:55 AM CST

## 2025-02-27 NOTE — TELEPHONE ENCOUNTER
No care due was identified.  [facility] Embedded Care Due Messages. Reference number: [id number].   2/27/2025 12:20:35 PM CST

## 2025-02-27 NOTE — TELEPHONE ENCOUNTER
Refill Routing Note   Medication(s) are not appropriate for processing by [facility] for the following reason(s):        Outside of protocol    [facility] action(s):  Route             Appointments  past 12m or future 3m with PCP    Date Provider   Last Visit   11/29/2024 Kyree Yeager MD   Next Visit   Visit date not found Kyree Yeager MD   ED visits in past 90 days: 0        Note composed:12:27 PM 02/27/2025

## 2025-02-27 NOTE — TELEPHONE ENCOUNTER
Refill Decision Note   John Sandifer  is requesting a refill authorization.  Brief Assessment and Rationale for Refill:  Approve     Medication Therapy Plan:        Comments:     Note composed:9:26 AM 02/27/2025

## 2025-03-02 DIAGNOSIS — J44.89 COPD (CHRONIC OBSTRUCTIVE PULMONARY DISEASE) WITH CHRONIC BRONCHITIS: ICD-10-CM

## 2025-03-02 RX ORDER — FLUTICASONE PROPIONATE AND SALMETEROL 250; 50 UG/1; UG/1
1 POWDER RESPIRATORY (INHALATION) 2 TIMES DAILY
Qty: 180 EACH | Refills: 2 | Status: SHIPPED | OUTPATIENT
Start: 2025-03-02

## 2025-03-02 NOTE — TELEPHONE ENCOUNTER
No care due was identified.  [facility] Embedded Care Due Messages. Reference number: [id number].   3/02/2025 2:12:59 PM CST

## 2025-03-03 NOTE — TELEPHONE ENCOUNTER
Refill Decision Note   John Sandifer  is requesting a refill authorization.  Brief Assessment and Rationale for Refill:  Approve     Medication Therapy Plan:         Comments:     Note composed:9:21 PM 03/02/2025

## 2025-03-05 DIAGNOSIS — E78.5 HYPERLIPIDEMIA LDL GOAL <100: ICD-10-CM

## 2025-03-05 RX ORDER — PRAVASTATIN SODIUM 40 MG/1
40 TABLET ORAL NIGHTLY
Qty: 90 TABLET | Refills: 3 | Status: SHIPPED | OUTPATIENT
Start: 2025-03-05

## 2025-03-05 NOTE — TELEPHONE ENCOUNTER
No care due was identified.  [facility] Embedded Care Due Messages. Reference number: [id number].   3/05/2025 12:05:14 PM CST

## 2025-04-15 NOTE — TELEPHONE ENCOUNTER
Lab Results   Component Value Date    EGFR 32 (L) 04/11/2025    EGFR 39 (L) 12/05/2024    EGFR 32 (L) 11/26/2024    CREATININE 2.20 (H) 04/11/2025    CREATININE 1.85 (H) 12/05/2024    CREATININE 2.23 (H) 11/26/2024   PTH and phosphorus are within expected range.  Vitamin D level is low and will prescribe vitamin D megadose   No care due was identified.  [facility] Embedded Care Due Messages. Reference number: [id number].   9/05/2023 9:21:40 AM CDT

## 2025-05-25 DIAGNOSIS — M79.2 NEURALGIA: ICD-10-CM

## 2025-05-25 NOTE — TELEPHONE ENCOUNTER
No care due was identified.  [facility] Embedded Care Due Messages. Reference number: [id number].   5/25/2025 5:03:43 PM CDT

## 2025-05-26 RX ORDER — GABAPENTIN 400 MG/1
CAPSULE ORAL
Qty: 180 CAPSULE | Refills: 0 | Status: SHIPPED | OUTPATIENT
Start: 2025-05-26

## 2025-05-26 NOTE — TELEPHONE ENCOUNTER
Refill Routing Note   Medication(s) are not appropriate for processing by [facility] for the following reason(s):        Outside of protocol    [facility] action(s):  Route             Appointments  past 12m or future 3m with PCP    Date Provider   Last Visit   11/29/2024 Kyree Yeager MD   Next Visit   Visit date not found Kyree Yeager MD   ED visits in past 90 days: 0        Note composed:11:20 AM 05/26/2025                 Saucerization Excision Additional Text (Leave Blank If You Do Not Want): The margin was drawn around the clinically apparent lesion.  Incisions were then made along these lines, in a tangential fashion, to the appropriate tissue plane and the lesion was extirpated.

## 2025-06-23 DIAGNOSIS — Z00.00 ENCOUNTER FOR MEDICARE ANNUAL WELLNESS EXAM: ICD-10-CM

## 2025-08-06 ENCOUNTER — PATIENT MESSAGE (OUTPATIENT)
Dept: FAMILY MEDICINE | Facility: CLINIC | Age: 80
End: 2025-08-06
Payer: MEDICARE

## 2025-08-27 DIAGNOSIS — M79.2 NEURALGIA: ICD-10-CM

## 2025-08-27 RX ORDER — GABAPENTIN 400 MG/1
CAPSULE ORAL
Qty: 180 CAPSULE | Refills: 0 | Status: SHIPPED | OUTPATIENT
Start: 2025-08-27

## 2025-09-03 DIAGNOSIS — I10 ESSENTIAL HYPERTENSION: ICD-10-CM

## 2025-09-03 RX ORDER — LOSARTAN POTASSIUM 100 MG/1
100 TABLET ORAL DAILY
Qty: 90 TABLET | Refills: 0 | Status: SHIPPED | OUTPATIENT
Start: 2025-09-03

## (undated) DEVICE — Device

## (undated) DEVICE — TUBING FOR LABORIE PUMP

## (undated) DEVICE — WIRE GUIDE .035 150CM.

## (undated) DEVICE — CATH ABD 7FR

## (undated) DEVICE — SYR ONLY LUER LOCK 20CC

## (undated) DEVICE — MAT QUICK 40X30 FLOOR FLUID LF

## (undated) DEVICE — SEE MEDLINE ITEM 157117

## (undated) DEVICE — SEE MEDLINE ITEM 152467

## (undated) DEVICE — SYR 50ML CATH TIP

## (undated) DEVICE — SUPPORT ULNA NERVE PROTECTOR

## (undated) DEVICE — GAUZE SPONGE 4X4 12PLY

## (undated) DEVICE — GLOVE SURGICAL LATEX SZ 6.5

## (undated) DEVICE — ELECTRODE LOOP CUTTING BIPOLAR

## (undated) DEVICE — ELECTRODE NEOTRODE II

## (undated) DEVICE — COVER SNAP KAP 26IN

## (undated) DEVICE — UNDERGLOVE BIOGEL PI SZ 6.5 LF

## (undated) DEVICE — CATH BLADDER/URETERAL 7FR

## (undated) DEVICE — SOL NS 1000CC

## (undated) DEVICE — SOL IRR NACL .9% 3000ML

## (undated) DEVICE — GLOVE, SURG,LATEX FREE SZ 7

## (undated) DEVICE — SEE MEDLINE ITEM 146313

## (undated) DEVICE — GLOVE BIOGEL ECLIPSE SZ 7.5

## (undated) DEVICE — SEE L#95700

## (undated) DEVICE — SOL IRR STRL WATER 500ML

## (undated) DEVICE — SOL 9P NACL IRR PIC IL